# Patient Record
Sex: MALE | Race: WHITE | NOT HISPANIC OR LATINO | Employment: UNEMPLOYED | URBAN - METROPOLITAN AREA
[De-identification: names, ages, dates, MRNs, and addresses within clinical notes are randomized per-mention and may not be internally consistent; named-entity substitution may affect disease eponyms.]

---

## 2017-03-08 ENCOUNTER — OFFICE VISIT (OUTPATIENT)
Dept: MEDICAL GROUP | Facility: MEDICAL CENTER | Age: 37
End: 2017-03-08
Payer: COMMERCIAL

## 2017-03-08 VITALS
HEART RATE: 83 BPM | TEMPERATURE: 98.2 F | HEIGHT: 70 IN | SYSTOLIC BLOOD PRESSURE: 120 MMHG | OXYGEN SATURATION: 98 % | WEIGHT: 180 LBS | RESPIRATION RATE: 12 BRPM | BODY MASS INDEX: 25.77 KG/M2 | DIASTOLIC BLOOD PRESSURE: 84 MMHG

## 2017-03-08 DIAGNOSIS — R19.8 IRREGULAR BOWEL HABITS: ICD-10-CM

## 2017-03-08 DIAGNOSIS — K64.9 HEMORRHOIDS, UNSPECIFIED HEMORRHOID TYPE: ICD-10-CM

## 2017-03-08 DIAGNOSIS — Z13.6 SCREENING FOR CARDIOVASCULAR CONDITION: ICD-10-CM

## 2017-03-08 PROCEDURE — 99214 OFFICE O/P EST MOD 30 MIN: CPT | Performed by: PHYSICIAN ASSISTANT

## 2017-03-08 RX ORDER — ONDANSETRON 4 MG/1
4 TABLET, FILM COATED ORAL EVERY 4 HOURS PRN
Qty: 30 TAB | Refills: 1 | Status: SHIPPED | OUTPATIENT
Start: 2017-03-08 | End: 2018-12-27

## 2017-03-08 ASSESSMENT — PATIENT HEALTH QUESTIONNAIRE - PHQ9: CLINICAL INTERPRETATION OF PHQ2 SCORE: 0

## 2017-03-08 NOTE — PROGRESS NOTES
Chief Complaint   Patient presents with   • Other       HISTORY OF PRESENT ILLNESS: Patient is a 36 y.o. male established patient who presents today to discuss problems with bowel habits    Irregular bowel habits  Chronic in nature. Positive history of alternating diarrhea and constipation. As a result positive history of hemorrhoids. He has seen gastroenterology consultants in the past in [place], [place]. Patient admits last seen GI consult proximally one year possibly longer ago. Patient states last colonoscopy 2011. Patient states that he's been told he is a candidate for surgery to help with hemorrhoids. As a result patient is very apprehensive and nervous about the surgery. Patient states that she does try to reduce the obsessive-compulsive Ball with defecation and tries to do only to good pushes rather then pushing until he does have a bowel movement. Patient states positive blood on occasion. Last episodes of bleeding approximately 1+ month ago. Patient she will have bouts of bleeding and then have no signs of bleeding. Patient states that he will wipe after having a bowel movement. Patient states typically will use a wet Kleenex followed by a paper towel in the region.         Patient Active Problem List    Diagnosis Date Noted   • Tourette disorder 07/21/2016   • Irregular bowel habits 07/21/2016   • Dyspepsia 07/21/2016       Allergies:Review of patient's allergies indicates no known allergies.    Current Outpatient Prescriptions   Medication Sig Dispense Refill   • witch hazel-glycerin (SOOZE) Pads i pad applied bid 60 Each 0   • ondansetron (ZOFRAN) 4 MG Tab tablet Take 1 Tab by mouth every four hours as needed for Nausea/Vomiting. 30 Tab 1   • clonidine (CATAPRESS) 0.3 MG Tab Take 1 Tab by mouth 3 times a day and at bedtime. 90 Tab 3   • ranitidine (ZANTAC) 150 MG Tab Take 1 Tab by mouth 2 times a day. 60 Tab 5     No current facility-administered medications for this visit.       Social History  "  Substance Use Topics   • Smoking status: Never Smoker    • Smokeless tobacco: Never Used   • Alcohol Use: No       No family status information on file.   No family history on file.    Review of Systems:   Constitutional: Negative for fever, chills, weight loss and malaise/fatigue. Patient states that he does sweat while sleeping excessively  HENT: Negative for ear pain, nosebleeds, congestion, sore throat and neck pain.    Eyes: Negative for blurred vision.   Respiratory: Negative for cough, sputum production, shortness of breath and wheezing.    Cardiovascular: Negative for chest pain, palpitations, orthopnea and leg swelling.   Gastrointestinal: Negative for heartburn, nausea, vomiting and abdominal pain.   Genitourinary: Negative for dysuria, urgency and frequency.   Musculoskeletal: Negative for myalgias, back pain and joint pain.   Skin: Negative for rash and itching.   Neurological: Negative for dizziness, tingling, tremors, sensory change, focal weakness and headaches.   Endo/Heme/Allergies: Does not bruise/bleed easily.   Psychiatric/Behavioral: Negative for depression, suicidal ideas and memory loss.  The patient is not nervous/anxious and does not have insomnia.    All other systems reviewed and are negative except as in HPI.    Exam:  Blood pressure 120/84, pulse 83, temperature 36.8 °C (98.2 °F), resp. rate 12, height 1.778 m (5' 10\"), weight 81.647 kg (180 lb), SpO2 98 %.  General:  Well nourished, well developed male in NAD  Head: is grossly normal.  Neck: Supple without JVD or bruit. Thyroid is not enlarged.  Pulmonary: Clear to ausculation. Normal effort. No rales, ronchi, or wheezing.  Cardiovascular: Regular rate and rhythm without murmur. Carotid and radial pulses are intact and equal bilaterally.  Extremities: no clubbing, cyanosis, or edema.  Genitourinary: Positive external hemorrhoids noted in the periphery of the anal opening. No tenderness to palpation. Nonthrombosed    Medical " decision-making and discussion: Discussion with patient we are to try Tucks medicated pads patient is advised he needs to follow up with gastrology consultants in [place], [place]. Advised to drink lots of fluids, no prolonged defecation. Patient very anxious and nervous during her evaluation today. Discussed with patient that we will discuss anxiety in future encounters. Labs have been ordered to check health status    Please note that this dictation was created using voice recognition software. I have made every reasonable attempt to correct obvious errors, but I expect that there are errors of grammar and possibly content that I did not discover before finalizing the note.    Assessment/Plan:  1. Irregular bowel habits  TSH    URINALYSIS,CULTURE IF INDICATED    ondansetron (ZOFRAN) 4 MG Tab tablet   2. Hemorrhoids, unspecified hemorrhoid type  witch hazel-glycerin (SOOZE) Pads    REFERRAL TO GASTROENTEROLOGY    COMP METABOLIC PANEL    URINALYSIS,CULTURE IF INDICATED    ondansetron (ZOFRAN) 4 MG Tab tablet   3. Screening for cardiovascular condition  LIPID PROFILE

## 2017-03-23 ENCOUNTER — TELEPHONE (OUTPATIENT)
Dept: MEDICAL GROUP | Facility: MEDICAL CENTER | Age: 37
End: 2017-03-23

## 2017-03-23 NOTE — TELEPHONE ENCOUNTER
----- Message from Marbella Salgado PA-C sent at 3/23/2017 11:56 AM PDT -----  I reviewed labs. There is a value I am concerned about and would like to have the patient return for follow up to discuss.

## 2017-03-23 NOTE — TELEPHONE ENCOUNTER
Phone Number Called: [phone]     Message: Left message for patient to make an appointment.     Left Message for patient to call back:yes

## 2017-03-24 NOTE — TELEPHONE ENCOUNTER
Phone Number Called: [phone] (home)     Message: Unable to contact patient. Phone disconnected.     Left Message for patient to call back: N\A

## 2017-04-05 ENCOUNTER — APPOINTMENT (OUTPATIENT)
Dept: MEDICAL GROUP | Facility: MEDICAL CENTER | Age: 37
End: 2017-04-05
Payer: COMMERCIAL

## 2017-04-11 ENCOUNTER — OFFICE VISIT (OUTPATIENT)
Dept: MEDICAL GROUP | Facility: MEDICAL CENTER | Age: 37
End: 2017-04-11
Payer: COMMERCIAL

## 2017-04-11 VITALS
SYSTOLIC BLOOD PRESSURE: 118 MMHG | TEMPERATURE: 98.1 F | BODY MASS INDEX: 25.2 KG/M2 | RESPIRATION RATE: 16 BRPM | DIASTOLIC BLOOD PRESSURE: 70 MMHG | WEIGHT: 176 LBS | OXYGEN SATURATION: 100 % | HEIGHT: 70 IN | HEART RATE: 74 BPM

## 2017-04-11 DIAGNOSIS — E87.6 HYPOKALEMIA: ICD-10-CM

## 2017-04-11 DIAGNOSIS — R10.13 DYSPEPSIA: ICD-10-CM

## 2017-04-11 DIAGNOSIS — R19.8 IRREGULAR BOWEL HABITS: ICD-10-CM

## 2017-04-11 DIAGNOSIS — E03.9 HYPOTHYROIDISM, UNSPECIFIED TYPE: ICD-10-CM

## 2017-04-11 PROCEDURE — 99214 OFFICE O/P EST MOD 30 MIN: CPT | Performed by: PHYSICIAN ASSISTANT

## 2017-04-11 RX ORDER — LEVOTHYROXINE SODIUM 0.03 MG/1
25 TABLET ORAL
Qty: 30 TAB | Refills: 2 | Status: SHIPPED | OUTPATIENT
Start: 2017-04-11 | End: 2017-10-03 | Stop reason: SINTOL

## 2017-04-11 RX ORDER — RABEPRAZOLE SODIUM 20 MG/1
20 TABLET, DELAYED RELEASE ORAL DAILY
Qty: 30 TAB | Refills: 3 | Status: SHIPPED | OUTPATIENT
Start: 2017-04-11 | End: 2017-08-19 | Stop reason: SDUPTHER

## 2017-04-11 RX ORDER — POTASSIUM CHLORIDE 750 MG/1
10 TABLET, FILM COATED, EXTENDED RELEASE ORAL DAILY
Qty: 5 TAB | Refills: 0 | Status: SHIPPED | OUTPATIENT
Start: 2017-04-11 | End: 2017-04-16

## 2017-04-11 NOTE — ASSESSMENT & PLAN NOTE
Chronic in nature. Patient states still symptomatic. Patient states he has not followed up with GI from previous encounter.

## 2017-04-11 NOTE — ASSESSMENT & PLAN NOTE
Patient states history of irregular bowel habits and history of onset stomach. Positive history of nausea. Patient states that ranitidine 150 mg prescription strength worked better than the over-the-counter remedy patient states that also the omeprazole did help, but states only 5 days at a time was being prescribed for patient and filled through his insurance. Patient she would like to try a different medication.

## 2017-04-11 NOTE — MR AVS SNAPSHOT
"        Adis Chance   2017 4:00 PM   Office Visit   MRN: [id number]    Department:  [place] [address]   Dept Phone:  [phone]    Description:  Male : 1980   Provider:  Lamberto Hassan PA-C           Reason for Visit     Results Labs    Medication Refill Omeprazole-Insurance won't cover      Allergies as of 2017     No Known Allergies      You were diagnosed with     Dyspepsia   [710304]       Irregular bowel habits   [150820]       Hypokalemia   [139073]       Hypothyroidism, unspecified type   [0616765]         Vital Signs     Blood Pressure Pulse Temperature Respirations Height Weight    118/70 mmHg 74 36.7 °C (98.1 °F) 16 1.778 m (5' 10\") 79.833 kg (176 lb)    Body Mass Index Oxygen Saturation Smoking Status             25.25 kg/m2 100% Never Smoker          Basic Information     Date Of Birth Sex Race Ethnicity Preferred Language    1980 Male White Non- English      Problem List              ICD-10-CM Priority Class Noted - Resolved    Tourette disorder F95.2   2016 - Present    Irregular bowel habits R19.8   2016 - Present    Dyspepsia R10.13   2016 - Present      Health Maintenance        Date Due Completion Dates    IMM DTaP/Tdap/Td Vaccine (1 - Tdap) 2000 ---    COLONOSCOPY 3/27/2018 3/27/2008            Current Immunizations     No immunizations on file.      Below and/or attached are the medications your provider expects you to take. Review all of your home medications and newly ordered medications with your provider and/or pharmacist. Follow medication instructions as directed by your provider and/or pharmacist. Please keep your medication list with you and share with your provider. Update the information when medications are discontinued, doses are changed, or new medications (including over-the-counter products) are added; and carry medication information at all times in the event of emergency situations     Allergies:  No Known Allergies       "   Medications  Valid as of: April 11, 2017 -  4:01 PM    Generic Name Brand Name Tablet Size Instructions for use    CloNIDine HCl (Tab) CATAPRESS 0.3 MG Take 1 Tab by mouth 3 times a day and at bedtime.        Levothyroxine Sodium (Tab) SYNTHROID 25 MCG Take 1 Tab by mouth Every morning on an empty stomach.        Ondansetron HCl (Tab) ZOFRAN 4 MG Take 1 Tab by mouth every four hours as needed for Nausea/Vomiting.        Potassium Chloride (Tab CR) KLOR-CON 10 MEQ Take 1 Tab by mouth every day for 5 days.        RABEprazole Sodium (Tablet Delayed Response) ACIPHEX 20 MG Take 1 Tab by mouth every day.        Witch Hazel-Glycerin (Pads) SOOZE  i pad applied bid        .                 Medicines prescribed today were sent to:     [facility] #[id number] - [place] [address] [address]    Phone: [phone] Fax: [phone]    Open 24 Hours?: No      Medication refill instructions:       If your prescription bottle indicates you have medication refills left, it is not necessary to call your provider’s office. Please contact your pharmacy and they will refill your medication.    If your prescription bottle indicates you do not have any refills left, you may request refills at any time through one of the following ways: The online Profig system (except Urgent Care), by calling your provider’s office, or by asking your pharmacy to contact your provider’s office with a refill request. Medication refills are processed only during regular business hours and may not be available until the next business day. Your provider may request additional information or to have a follow-up visit with you prior to refilling your medication.   *Please Note: Medication refills are assigned a new Rx number when refilled electronically. Your pharmacy may indicate that no refills were authorized even though a new prescription for the same medication is available at the pharmacy. Please  request the medicine by name with the pharmacy before contacting your provider for a refill.           Distill Access Code: [email or web address]  Expires: 4/25/2017 11:08 AM    Your email address is not on file at [facility].  Email Addresses are required for you to sign up for Distill, please contact [phone] to verify your personal information and to provide your email address prior to attempting to register for Distill.    Adis Renaelexi   [address] [address]  [place] [address]    Distill  A secure, online tool to manage your health information     [facility]’s Distill® is a secure, online tool that connects you to your personalized health information from the privacy of your home -- day or night - making it very easy for you to manage your healthcare. Once the activation process is completed, you can even access your medical information using the Distill francisco, which is available for free in the Apple Francisco store or Google Play store.     To learn more about Distill, visit [email or web address]    There are two levels of access available (as shown below):   My Chart Features  [facility] Primary Care Doctor [facility]  Specialists [facility]  Urgent  Care Non-[facility] Primary Care Doctor   Email your healthcare team securely and privately 24/7 X X X    Manage appointments: schedule your next appointment; view details of past/upcoming appointments X      Request prescription refills. X      View recent personal medical records, including lab and immunizations X X X X   View health record, including health history, allergies, medications X X X X   Read reports about your outpatient visits, procedures, consult and ER notes X X X X   See your discharge summary, which is a recap of your hospital and/or ER visit that includes your diagnosis, lab results, and care plan X X  X     How to register for Distill:  Once your e-mail address has been verified, follow the following steps to sign up for Distill.     1. Go to   [email or web address]  2. Click on the Sign Up Now box, which takes you to the New Member Sign Up page. You will need to provide the following information:  a. Enter your Curalate Access Code exactly as it appears at the top of this page. (You will not need to use this code after you’ve completed the sign-up process. If you do not sign up before the expiration date, you must request a new code.)   b. Enter your date of birth.   c. Enter your home email address.   d. Click Submit, and follow the next screen’s instructions.  3. Create a Curalate ID. This will be your Curalate login ID and cannot be changed, so think of one that is secure and easy to remember.  4. Create a eCullett password. You can change your password at any time.  5. Enter your Password Reset Question and Answer. This can be used at a later time if you forget your password.   6. Enter your e-mail address. This allows you to receive e-mail notifications when new information is available in Curalate.  7. Click Sign Up. You can now view your health information.    For assistance activating your Curalate account, call [phone]

## 2017-04-28 ENCOUNTER — OFFICE VISIT (OUTPATIENT)
Dept: MEDICAL GROUP | Facility: MEDICAL CENTER | Age: 37
End: 2017-04-28
Payer: COMMERCIAL

## 2017-04-28 VITALS
OXYGEN SATURATION: 100 % | DIASTOLIC BLOOD PRESSURE: 80 MMHG | SYSTOLIC BLOOD PRESSURE: 130 MMHG | TEMPERATURE: 99.2 F | HEART RATE: 76 BPM | WEIGHT: 172 LBS | HEIGHT: 70 IN | BODY MASS INDEX: 24.62 KG/M2

## 2017-04-28 DIAGNOSIS — J10.1 INFLUENZA B: ICD-10-CM

## 2017-04-28 PROBLEM — J02.9 PHARYNGITIS: Status: ACTIVE | Noted: 2017-04-28

## 2017-04-28 LAB
FLUAV+FLUBV AG SPEC QL IA: NORMAL
INT CON NEG: NEGATIVE
INT CON POS: POSITIVE

## 2017-04-28 PROCEDURE — 87804 INFLUENZA ASSAY W/OPTIC: CPT | Performed by: FAMILY MEDICINE

## 2017-04-28 PROCEDURE — 99214 OFFICE O/P EST MOD 30 MIN: CPT | Performed by: FAMILY MEDICINE

## 2017-04-28 RX ORDER — CODEINE PHOSPHATE/GUAIFENESIN 10-100MG/5
5 LIQUID (ML) ORAL 3 TIMES DAILY PRN
Qty: 180 ML | Refills: 0 | Status: SHIPPED
Start: 2017-04-28 | End: 2017-04-28 | Stop reason: SDUPTHER

## 2017-04-28 RX ORDER — CODEINE PHOSPHATE/GUAIFENESIN 10-100MG/5
5 LIQUID (ML) ORAL 3 TIMES DAILY PRN
Qty: 180 ML | Refills: 0 | Status: SHIPPED | OUTPATIENT
Start: 2017-04-28 | End: 2017-06-15

## 2017-04-28 NOTE — PATIENT INSTRUCTIONS
Influenza, Adult  Influenza (flu) is an infection in the mouth, nose, and throat (respiratory tract) caused by a virus. The flu can make you feel very ill. Influenza spreads easily from person to person (contagious).   HOME CARE   · Only take medicines as told by your doctor.  · Use a cool mist humidifier to make breathing easier.  · Get plenty of rest until your fever goes away. This usually takes 3 to 4 days.  · Drink enough fluids to keep your pee (urine) clear or pale yellow.  · Cover your mouth and nose when you cough or sneeze.  · Wash your hands well to avoid spreading the flu.  · Stay home from work or school until your fever has been gone for at least 1 full day.  · Get a flu shot every year.  GET HELP RIGHT AWAY IF:   · You have trouble breathing or feel short of breath.  · Your skin or nails turn blue.  · You have severe neck pain or stiffness.  · You have a severe headache, facial pain, or earache.  · Your fever gets worse or keeps coming back.  · You feel sick to your stomach (nauseous), throw up (vomit), or have watery poop (diarrhea).  · You have chest pain.  · You have a deep cough that gets worse, or you cough up more thick spit (mucus).  MAKE SURE YOU:   · Understand these instructions.  · Will watch your condition.  · Will get help right away if you are not doing well or get worse.     This information is not intended to replace advice given to you by your health care provider. Make sure you discuss any questions you have with your health care provider.     Document Released: 09/26/2009 Document Revised: 01/08/2016 Document Reviewed: 03/18/2013  [employer] Interactive Patient Education ©2016 [employer]

## 2017-04-28 NOTE — ASSESSMENT & PLAN NOTE
Illness: 5 days of illness including: nasal congestion, sore throat, cough, myalgias, chills.  Diffuse headache    Symptoms negative for facial pain, swollen glands,   Treatments tried: none   Since onset, symptoms are much worse   Similarly ill exposures: yes.  Dad had it and went to the ICU on Sunday  Medical history negative for asthma  He  reports that he has never smoked. He has never used smokeless tobacco.

## 2017-04-28 NOTE — PROGRESS NOTES
"Subjective:     Chief Complaint   Patient presents with   • Pharyngitis     since Sunday   • Cough       Adis Chance is a 36 y.o. male here today for evaluation and management of:    Influenza B  Illness: 5 days of illness including: nasal congestion, sore throat, cough, myalgias, chills.  Diffuse headache    Symptoms negative for facial pain, swollen glands,   Treatments tried: none   Since onset, symptoms are much worse   Similarly ill exposures: yes.  Dad had it and went to the ICU on Sunday  Medical history negative for asthma  He  reports that he has never smoked. He has never used smokeless tobacco.         No Known Allergies    Current medicines (including changes today)  Current Outpatient Prescriptions   Medication Sig Dispense Refill   • guaifenesin-codeine (TUSSI-ORGANIDIN NR) 100-10 MG/5ML syrup Take 5 mL by mouth 3 times a day as needed for Cough. 180 mL 0   • rabeprazole (ACIPHEX) 20 MG tablet Take 1 Tab by mouth every day. 30 Tab 3   • levothyroxine (SYNTHROID) 25 MCG Tab Take 1 Tab by mouth Every morning on an empty stomach. 30 Tab 2   • witch hazel-glycerin (SOOZE) Pads i pad applied bid 60 Each 0   • clonidine (CATAPRESS) 0.3 MG Tab Take 1 Tab by mouth 3 times a day and at bedtime. 90 Tab 3   • ondansetron (ZOFRAN) 4 MG Tab tablet Take 1 Tab by mouth every four hours as needed for Nausea/Vomiting. 30 Tab 1     No current facility-administered medications for this visit.       He  has no past medical history on file.    Patient Active Problem List    Diagnosis Date Noted   • Influenza B 04/28/2017   • Tourette disorder 07/21/2016   • Irregular bowel habits 07/21/2016   • Dyspepsia 07/21/2016       ROS     No nausea or vomiting.  No palpitations.  .No pain with urination or hematuria.  No black or bloody stools.       Objective:     Blood pressure 130/80, pulse 76, temperature 37.3 °C (99.2 °F), height 1.778 m (5' 10\"), weight 78.019 kg (172 lb), SpO2 100 %. Body mass index is 24.68 kg/(m^2). "   Physical Exam:  Well developed, well nourished.  Alert, oriented in mild distress.  Eye contact is good, speech goal directed, affect calm  Eyes: conjunctiva non-injected, sclera non-icteric.  Ears: Pinna normal. TM pearly gray.   Nose: Nares are patent.  Erythematous swollen mucosa with clear discharge  Mouth: Oral mucous membranes pink and moist with no lesions. Mild diffuse posterior pharynx erythema  Neck Supple.  No adenopathy or masses in the neck or supraclavicular regions. No thyromegaly  Lungs: clear to auscultation bilaterally with good excursion. No wheezes or rhonchi. No increased work of breathing or intercostal retractions  CV: regular rate and rhythm. No murmur   POCT Influenza B positive      Assessment and Plan:   The following treatment plan was discussed    1. Influenza B  Discussed with patient that he is beyond the time when Tamiflu would be effective. At this point it is symptomatic care. Increase fluids and rest. Robitussin with codeine as needed for cough. Patient is not currently working. So does not a note off. Call if not improving  - POCT Influenza A/B  - guaifenesin-codeine (TUSSI-ORGANIDIN NR) 100-10 MG/5ML syrup; Take 5 mL by mouth 3 times a day as needed for Cough.  Dispense: 180 mL; Refill: 0    Any change or worsening of signs or symptoms, patient encouraged to follow-up or report to the emergency room for further evaluation. Patient understands and agrees.    Followup: Return if symptoms worsen or fail to improve.

## 2017-04-28 NOTE — MR AVS SNAPSHOT
"        Adis Chance   2017 2:40 PM   Office Visit   MRN: [id number]    Department:  [facility]   Dept Phone:  [phone]    Description:  Male : 1980   Provider:  Li Fabian M.D.           Reason for Visit     Pharyngitis since     Cough           Allergies as of 2017     No Known Allergies      You were diagnosed with     Influenza B   [[id number]]         Vital Signs     Blood Pressure Pulse Temperature Height Weight Body Mass Index    130/80 mmHg 76 37.3 °C (99.2 °F) 1.778 m (5' 10\") 78.019 kg (172 lb) 24.68 kg/m2    Oxygen Saturation Smoking Status                100% Never Smoker           Basic Information     Date Of Birth Sex Race Ethnicity Preferred Language    1980 Male White Non- English      Problem List              ICD-10-CM Priority Class Noted - Resolved    Tourette disorder F95.2   2016 - Present    Irregular bowel habits R19.8   2016 - Present    Dyspepsia R10.13   2016 - Present    Influenza B J10.1   2017 - Present      Health Maintenance        Date Due Completion Dates    IMM DTaP/Tdap/Td Vaccine (1 - Tdap) 2000 ---    COLONOSCOPY 3/27/2018 3/27/2008            Results     POCT Influenza A/B      Component    Rapid Influenza A-B    POS    Internal Control Positive    Positive    Internal Control Negative    Negative                        Current Immunizations     No immunizations on file.      Below and/or attached are the medications your provider expects you to take. Review all of your home medications and newly ordered medications with your provider and/or pharmacist. Follow medication instructions as directed by your provider and/or pharmacist. Please keep your medication list with you and share with your provider. Update the information when medications are discontinued, doses are changed, or new medications (including over-the-counter products) are added; and carry medication information at all times in the event " of emergency situations     Allergies:  No Known Allergies          Medications  Valid as of: April 28, 2017 -  3:24 PM    Generic Name Brand Name Tablet Size Instructions for use    CloNIDine HCl (Tab) CATAPRESS 0.3 MG Take 1 Tab by mouth 3 times a day and at bedtime.        Guaifenesin-Codeine (Syrup) TUSSI-ORGANIDIN -10 MG/5ML Take 5 mL by mouth 3 times a day as needed for Cough.        Levothyroxine Sodium (Tab) SYNTHROID 25 MCG Take 1 Tab by mouth Every morning on an empty stomach.        Ondansetron HCl (Tab) ZOFRAN 4 MG Take 1 Tab by mouth every four hours as needed for Nausea/Vomiting.        RABEprazole Sodium (Tablet Delayed Response) ACIPHEX 20 MG Take 1 Tab by mouth every day.        Witch Hazel-Glycerin (Pads) SOOZE  i pad applied bid        .                 Medicines prescribed today were sent to:     [facility]/PHARMACY #[id number] [address] [address]    Phone: [phone] Fax: [phone]    Open 24 Hours?: No      Medication refill instructions:       If your prescription bottle indicates you have medication refills left, it is not necessary to call your provider’s office. Please contact your pharmacy and they will refill your medication.    If your prescription bottle indicates you do not have any refills left, you may request refills at any time through one of the following ways: The online [employer] system (except Urgent Care), by calling your provider’s office, or by asking your pharmacy to contact your provider’s office with a refill request. Medication refills are processed only during regular business hours and may not be available until the next business day. Your provider may request additional information or to have a follow-up visit with you prior to refilling your medication.   *Please Note: Medication refills are assigned a new Rx number when refilled electronically. Your pharmacy may indicate that no refills were authorized  even though a new prescription for the same medication is available at the pharmacy. Please request the medicine by name with the pharmacy before contacting your provider for a refill.        Instructions    Influenza, Adult  Influenza (flu) is an infection in the mouth, nose, and throat (respiratory tract) caused by a virus. The flu can make you feel very ill. Influenza spreads easily from person to person (contagious).   HOME CARE   · Only take medicines as told by your doctor.  · Use a cool mist humidifier to make breathing easier.  · Get plenty of rest until your fever goes away. This usually takes 3 to 4 days.  · Drink enough fluids to keep your pee (urine) clear or pale yellow.  · Cover your mouth and nose when you cough or sneeze.  · Wash your hands well to avoid spreading the flu.  · Stay home from work or school until your fever has been gone for at least 1 full day.  · Get a flu shot every year.  GET HELP RIGHT AWAY IF:   · You have trouble breathing or feel short of breath.  · Your skin or nails turn blue.  · You have severe neck pain or stiffness.  · You have a severe headache, facial pain, or earache.  · Your fever gets worse or keeps coming back.  · You feel sick to your stomach (nauseous), throw up (vomit), or have watery poop (diarrhea).  · You have chest pain.  · You have a deep cough that gets worse, or you cough up more thick spit (mucus).  MAKE SURE YOU:   · Understand these instructions.  · Will watch your condition.  · Will get help right away if you are not doing well or get worse.     This information is not intended to replace advice given to you by your health care provider. Make sure you discuss any questions you have with your health care provider.     Document Released: 09/26/2009 Document Revised: 01/08/2016 Document Reviewed: 03/18/2013  [employer] Interactive Patient Education ©2016 [employer]            MyChart Status: Patient Declined

## 2017-05-22 ENCOUNTER — APPOINTMENT (OUTPATIENT)
Dept: MEDICAL GROUP | Facility: MEDICAL CENTER | Age: 37
End: 2017-05-22
Payer: COMMERCIAL

## 2017-06-15 ENCOUNTER — OFFICE VISIT (OUTPATIENT)
Dept: MEDICAL GROUP | Facility: MEDICAL CENTER | Age: 37
End: 2017-06-15
Payer: COMMERCIAL

## 2017-06-15 VITALS
HEIGHT: 70 IN | DIASTOLIC BLOOD PRESSURE: 70 MMHG | TEMPERATURE: 98.5 F | BODY MASS INDEX: 25.31 KG/M2 | WEIGHT: 176.8 LBS | HEART RATE: 66 BPM | SYSTOLIC BLOOD PRESSURE: 102 MMHG | RESPIRATION RATE: 16 BRPM | OXYGEN SATURATION: 98 %

## 2017-06-15 DIAGNOSIS — G89.29 NECK PAIN, CHRONIC: ICD-10-CM

## 2017-06-15 DIAGNOSIS — F42.9 OBSESSIVE-COMPULSIVE DISORDER, UNSPECIFIED TYPE: ICD-10-CM

## 2017-06-15 DIAGNOSIS — K62.5 RECTAL BLEEDING: ICD-10-CM

## 2017-06-15 DIAGNOSIS — F95.2 TOURETTE DISORDER: ICD-10-CM

## 2017-06-15 DIAGNOSIS — E03.8 SUBCLINICAL HYPOTHYROIDISM: ICD-10-CM

## 2017-06-15 DIAGNOSIS — R21 RASH: ICD-10-CM

## 2017-06-15 DIAGNOSIS — M54.2 NECK PAIN, CHRONIC: ICD-10-CM

## 2017-06-15 PROCEDURE — 99214 OFFICE O/P EST MOD 30 MIN: CPT | Performed by: PHYSICIAN ASSISTANT

## 2017-06-15 RX ORDER — CLONIDINE HYDROCHLORIDE 0.3 MG/1
0.3 TABLET ORAL
Qty: 90 TAB | Refills: 3 | Status: SHIPPED | OUTPATIENT
Start: 2017-06-15 | End: 2017-10-14 | Stop reason: SDUPTHER

## 2017-06-15 RX ORDER — TRIAMCINOLONE ACETONIDE 1 MG/G
1 CREAM TOPICAL 2 TIMES DAILY
Qty: 1 TUBE | Refills: 2 | Status: SHIPPED | OUTPATIENT
Start: 2017-06-15 | End: 2017-11-30

## 2017-06-15 NOTE — ASSESSMENT & PLAN NOTE
He states that because of his OCD he will sit until he has pressure down in the rectum then once that is alleviating on the toilet he will continue to press and push and bear down trying to have a bowel movement. This will go on for a long time.

## 2017-06-15 NOTE — ASSESSMENT & PLAN NOTE
Was diagnosed with subclinical hypothyroidism and placed on 25 µg of levothyroxine. Complained of fatigue at that time. While he was taking the medication he developed episodes of tachycardia. He has not been taking the medication for over a month. His last TSH value when he was provided medication was in the 5 range.
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
## 2017-06-15 NOTE — ASSESSMENT & PLAN NOTE
Complains of continued issues with rectal bleeding secondary to internal and external hemorrhoids. When he saw GI they advised him to see a general surgeon. He states that creams are not effective. He will eat once a day. Sometimes he will not have a bowel movement for several days. He states that he will sit on the toilet for an excess amount of time and even up to 2 hours.

## 2017-06-15 NOTE — ASSESSMENT & PLAN NOTE
History of a rash on his bilateral, anterior aspects of his legs with the right side being worse than the left. Requesting a refill of a cream and likely a steroid cream.

## 2017-06-15 NOTE — ASSESSMENT & PLAN NOTE
This is a 37-year-old male who is accompanied by his mother. Tourette's he states acts up worse in the summertime. He is requesting a refill of his clonidine. He never did follow-up with psychiatry per my referral.
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
## 2017-10-03 ENCOUNTER — OFFICE VISIT (OUTPATIENT)
Dept: MEDICAL GROUP | Facility: PHYSICIAN GROUP | Age: 37
End: 2017-10-03
Payer: COMMERCIAL

## 2017-10-03 ENCOUNTER — HOSPITAL ENCOUNTER (OUTPATIENT)
Dept: LAB | Facility: MEDICAL CENTER | Age: 37
End: 2017-10-03
Attending: FAMILY MEDICINE
Payer: COMMERCIAL

## 2017-10-03 VITALS
HEART RATE: 73 BPM | RESPIRATION RATE: 16 BRPM | SYSTOLIC BLOOD PRESSURE: 102 MMHG | HEIGHT: 70 IN | WEIGHT: 180 LBS | TEMPERATURE: 98.6 F | BODY MASS INDEX: 25.77 KG/M2 | DIASTOLIC BLOOD PRESSURE: 70 MMHG | OXYGEN SATURATION: 98 %

## 2017-10-03 DIAGNOSIS — D64.9 ANEMIA, UNSPECIFIED TYPE: ICD-10-CM

## 2017-10-03 DIAGNOSIS — E03.8 SUBCLINICAL HYPOTHYROIDISM: ICD-10-CM

## 2017-10-03 DIAGNOSIS — F95.2 TOURETTE DISORDER: ICD-10-CM

## 2017-10-03 DIAGNOSIS — S86.811D STRAIN OF CALF MUSCLE, RIGHT, SUBSEQUENT ENCOUNTER: ICD-10-CM

## 2017-10-03 DIAGNOSIS — K58.2 IRRITABLE BOWEL SYNDROME WITH BOTH CONSTIPATION AND DIARRHEA: ICD-10-CM

## 2017-10-03 LAB
ANISOCYTOSIS BLD QL SMEAR: ABNORMAL
BASOPHILS # BLD AUTO: 4.4 % (ref 0–1.8)
BASOPHILS # BLD: 0.18 K/UL (ref 0–0.12)
EOSINOPHIL # BLD AUTO: 0.55 K/UL (ref 0–0.51)
EOSINOPHIL NFR BLD: 13.1 % (ref 0–6.9)
ERYTHROCYTE [DISTWIDTH] IN BLOOD BY AUTOMATED COUNT: 53.4 FL (ref 35.9–50)
FOLATE SERPL-MCNC: 4.8 NG/ML
HCT VFR BLD AUTO: 33.5 % (ref 42–52)
HGB BLD-MCNC: 9.4 G/DL (ref 14–18)
IRON SATN MFR SERPL: 3 % (ref 15–55)
IRON SERPL-MCNC: 14 UG/DL (ref 50–180)
LYMPHOCYTES # BLD AUTO: 0.66 K/UL (ref 1–4.8)
LYMPHOCYTES NFR BLD: 15.8 % (ref 22–41)
MANUAL DIFF BLD: NORMAL
MCH RBC QN AUTO: 20.9 PG (ref 27–33)
MCHC RBC AUTO-ENTMCNC: 28.1 G/DL (ref 33.7–35.3)
MCV RBC AUTO: 74.4 FL (ref 81.4–97.8)
MICROCYTES BLD QL SMEAR: ABNORMAL
MONOCYTES # BLD AUTO: 0.37 K/UL (ref 0–0.85)
MONOCYTES NFR BLD AUTO: 8.8 % (ref 0–13.4)
MORPHOLOGY BLD-IMP: NORMAL
NEUTROPHILS # BLD AUTO: 2.43 K/UL (ref 1.82–7.42)
NEUTROPHILS NFR BLD: 57.9 % (ref 44–72)
NRBC # BLD AUTO: 0 K/UL
NRBC BLD AUTO-RTO: 0 /100 WBC
PLATELET # BLD AUTO: 411 K/UL (ref 164–446)
PMV BLD AUTO: 8.7 FL (ref 9–12.9)
POIKILOCYTOSIS BLD QL SMEAR: NORMAL
RBC # BLD AUTO: 4.5 M/UL (ref 4.7–6.1)
RBC BLD AUTO: PRESENT
SCHISTOCYTES BLD QL SMEAR: NORMAL
TIBC SERPL-MCNC: 440 UG/DL (ref 250–450)
VIT B12 SERPL-MCNC: 456 PG/ML (ref 211–911)
WBC # BLD AUTO: 4.2 K/UL (ref 4.8–10.8)

## 2017-10-03 PROCEDURE — 82607 VITAMIN B-12: CPT

## 2017-10-03 PROCEDURE — 85007 BL SMEAR W/DIFF WBC COUNT: CPT

## 2017-10-03 PROCEDURE — 83540 ASSAY OF IRON: CPT

## 2017-10-03 PROCEDURE — 82746 ASSAY OF FOLIC ACID SERUM: CPT

## 2017-10-03 PROCEDURE — 36415 COLL VENOUS BLD VENIPUNCTURE: CPT

## 2017-10-03 PROCEDURE — 99214 OFFICE O/P EST MOD 30 MIN: CPT | Performed by: FAMILY MEDICINE

## 2017-10-03 PROCEDURE — 85027 COMPLETE CBC AUTOMATED: CPT

## 2017-10-03 PROCEDURE — 83550 IRON BINDING TEST: CPT

## 2017-10-03 RX ORDER — RABEPRAZOLE SODIUM 20 MG/1
20 TABLET, DELAYED RELEASE ORAL
Qty: 30 TAB | Refills: 5 | Status: SHIPPED | OUTPATIENT
Start: 2017-10-03 | End: 2017-11-30

## 2017-10-03 ASSESSMENT — ENCOUNTER SYMPTOMS
RESPIRATORY NEGATIVE: 1
PSYCHIATRIC NEGATIVE: 1
CONSTIPATION: 1
COUGH: 0
PALPITATIONS: 0
ABDOMINAL PAIN: 1
NAUSEA: 1
HEARTBURN: 1
DIZZINESS: 0
MYALGIAS: 0
CONSTITUTIONAL NEGATIVE: 1
CARDIOVASCULAR NEGATIVE: 1
HEMOPTYSIS: 0
HEADACHES: 0
EYES NEGATIVE: 1
DIARRHEA: 1
NEUROLOGICAL NEGATIVE: 1
MUSCULOSKELETAL NEGATIVE: 1
NECK PAIN: 0
FEVER: 0
BELCHING: 1
CHILLS: 0

## 2017-10-03 NOTE — PROGRESS NOTES
Subjective:      Adis Chance is a 37 y.o. male who presents with Cold Exposure and GI Problem            Patient with history of IBS, had a recent worsening of his abd pain and seen in er, negative ct of abdomen but also found to be slightly anemic but guiac negative  Will have him see his GI for f/u but needs some labs for eval of anemia    There are no diagnoses linked to this encounter.  Past Medical History:  No date: GERD (gastroesophageal reflux disease)  No date: IBS (irritable bowel syndrome)  No date: Thyroid disease  No date: Tourette's  No past surgical history on file.  Smoking status: Never Smoker                                                              Smokeless tobacco: Never Used                      Alcohol use: Yes             History reviewed.  No pertinent family history.      Current Outpatient Prescriptions: •  rabeprazole (ACIPHEX) 20 MG tablet, TAKE 1 TABLET BY MOUTH EVERY DAY, Disp: 30 Tab, Rfl: 5•  clonidine (CATAPRESS) 0.3 MG Tab, Take 1 Tab by mouth 3 times a day and at bedtime., Disp: 90 Tab, Rfl: 3•  triamcinolone acetonide (KENALOG) 0.1 % Cream, Apply 1 Application to affected area(s) 2 times a day. (Patient not taking: Reported on 10/3/2017), Disp: 1 Tube, Rfl: 2•  witch hazel-glycerin (SOOZE) Pads, i pad applied bid (Patient not taking: Reported on 10/3/2017), Disp: 60 Each, Rfl: 0•  ondansetron (ZOFRAN) 4 MG Tab tablet, Take 1 Tab by mouth every four hours as needed for Nausea/Vomiting., Disp: 30 Tab, Rfl: 1        LLQ Pain   This is a recurrent problem. The current episode started in the past 7 days. The onset quality is sudden. The problem occurs intermittently. The problem has been gradually improving. The pain is located in the LLQ and RLQ. The pain is mild. The quality of the pain is colicky. The abdominal pain does not radiate. Associated symptoms include belching, constipation, diarrhea and nausea. Pertinent negatives include no dysuria, fever, headaches or myalgias.  "Nothing aggravates the pain. The pain is relieved by nothing. He has tried nothing for the symptoms. The treatment provided no relief.       Review of Systems   Constitutional: Negative.  Negative for chills and fever.        Past Medical History:  No date: GERD (gastroesophageal reflux disease)  No date: IBS (irritable bowel syndrome)  No date: Thyroid disease  No date: Tourette's  No past surgical history on file.  Smoking status: Never Smoker                                                              Smokeless tobacco: Never Used                      Alcohol use: Yes             History reviewed.  No pertinent family history.     HENT: Positive for congestion.    Eyes: Negative.    Respiratory: Negative.  Negative for cough and hemoptysis.    Cardiovascular: Negative.  Negative for chest pain and palpitations.   Gastrointestinal: Positive for abdominal pain, constipation, diarrhea, heartburn and nausea.   Genitourinary: Negative.  Negative for dysuria and urgency.   Musculoskeletal: Negative.  Negative for myalgias and neck pain.   Skin: Negative.  Negative for rash.   Neurological: Negative.  Negative for dizziness and headaches.   Endo/Heme/Allergies: Negative.    Psychiatric/Behavioral: Negative.  Negative for suicidal ideas.          Objective:     /70   Pulse 73   Temp 37 °C (98.6 °F)   Resp 16   Ht 1.778 m (5' 10\")   Wt 81.6 kg (180 lb)   SpO2 98%   BMI 25.83 kg/m²      Physical Exam   Constitutional: He is oriented to person, place, and time. He appears well-developed and well-nourished. No distress.   HENT:   Head: Normocephalic and atraumatic.   Right Ear: External ear normal.   Left Ear: External ear normal.   Nose: Nose normal.   Mouth/Throat: Oropharynx is clear and moist. No oropharyngeal exudate.   Eyes: Pupils are equal, round, and reactive to light. Right eye exhibits no discharge. Left eye exhibits no discharge. No scleral icterus.   Neck: Normal range of motion. Neck supple. No " JVD present. No tracheal deviation present. No thyromegaly present.   Cardiovascular: Normal rate, regular rhythm, normal heart sounds and intact distal pulses.  Exam reveals no gallop and no friction rub.    No murmur heard.  Pulmonary/Chest: Effort normal and breath sounds normal. No stridor. No respiratory distress. He has no wheezes. He has no rales. He exhibits no tenderness.   Abdominal: Soft. He exhibits no distension and no mass. There is no tenderness. There is no rebound and no guarding. No hernia.   Lymphadenopathy:     He has no cervical adenopathy.   Neurological: He is alert and oriented to person, place, and time. No cranial nerve deficit.   Skin: He is not diaphoretic.   Psychiatric: He has a normal mood and affect. His behavior is normal. Judgment and thought content normal.   Nursing note and vitals reviewed.              Assessment/Plan:     There are no diagnoses linked to this encounter.  ibs- f/u with GI  Uri - otc prn  Anemia will get labs for eval

## 2017-10-14 DIAGNOSIS — F95.2 TOURETTE DISORDER: ICD-10-CM

## 2017-10-16 RX ORDER — CLONIDINE HYDROCHLORIDE 0.3 MG/1
0.3 TABLET ORAL
Qty: 90 TAB | Refills: 1 | Status: SHIPPED | OUTPATIENT
Start: 2017-10-16 | End: 2017-12-13 | Stop reason: SDUPTHER

## 2017-10-16 NOTE — TELEPHONE ENCOUNTER
Was the patient seen in the last year in this department? Yes    Last visit: 06/15/2017    Does patient have an active prescription for medications requested? No     Received Request Via: Pharmacy

## 2017-10-18 ENCOUNTER — PHYSICAL THERAPY (OUTPATIENT)
Dept: PHYSICAL THERAPY | Facility: MEDICAL CENTER | Age: 37
End: 2017-10-18
Attending: PHYSICIAN ASSISTANT
Payer: COMMERCIAL

## 2017-10-18 DIAGNOSIS — S86.811D STRAIN OF OTHER MUSCLE(S) AND TENDON(S) AT LOWER LEG LEVEL, RIGHT LEG, SUBSEQUENT ENCOUNTER: ICD-10-CM

## 2017-10-18 PROCEDURE — 97161 PT EVAL LOW COMPLEX 20 MIN: CPT

## 2017-10-18 PROCEDURE — 97014 ELECTRIC STIMULATION THERAPY: CPT

## 2017-10-18 ASSESSMENT — ENCOUNTER SYMPTOMS
ALLEVIATING FACTORS: ICE
PAIN SCALE AT LOWEST: 0
QUALITY: PULLING
PAIN SCALE: 0
EXACERBATED BY: RUNNING
PAIN SCALE AT HIGHEST: 7
PAIN LOCATION: R CALF
PAIN TIMING: WHEN ACTIVE
ALLEVIATING FACTORS: REST

## 2017-10-18 NOTE — OP THERAPY EVALUATION
Outpatient Physical Therapy  INITIAL EVALUATION    [facility] Outpatient Physical Therapy  [address]  [place] [address]  Phone:  [phone]  Fax:  [phone]    Date of Evaluation: 10/18/2017    Patient: Adis Chance  YOB: 1980  MRN: [id number]     Referring Provider: Elmer Ellington M.D.  [id number]  [place] [address]   Referring Diagnosis Strain of other muscle(s) and tendon(s) at lower leg level, right leg, subsequent encounter [S86.811D]     Time Calculation                 Chief Complaint: Foot Problem (R calf strain. )    Visit Diagnoses     ICD-10-CM   1. Strain of other muscle(s) and tendon(s) at lower leg level, right leg, subsequent encounter S86.811D         Subjective:   History of Present Illness:     Date of onset:  2017    Mechanism of injury:  Patient is 37 y.o male with R calf strain. Approx Aug 4th during soft ball. Running to his right for a line drive. Was feeling a little bit better but re injured approx 3 weeks later. Reports it was bruised for a while but is better. Just limits him whenever he tries to be active.   Pain:     Current pain ratin    At best pain ratin    At worst pain ratin    Location:  R calf     Quality:  Pulling    Pain timing:  When active    Relieving factors:  Ice and rest    Aggravating factors:  Running    Pain Comments::  Pain only present during active/sports/softball.   Patient Goals:     Patient goals for therapy:  Increased strength and return to sport/leisure activities      Past Medical History:   Diagnosis Date   • GERD (gastroesophageal reflux disease)    • IBS (irritable bowel syndrome)    • Thyroid disease    • Tourette's      No past surgical history on file.  Social History   Substance Use Topics   • Smoking status: Never Smoker   • Smokeless tobacco: Never Used   • Alcohol use Yes     Family and Occupational History     Social History   • Marital status: Single      "Spouse name: N/A   • Number of children: N/A   • Years of education: N/A       Current Outpatient Prescriptions:   •  clonidine, 0.3 mg, Oral, TID + HS  •  rabeprazole, 20 mg, Oral, QDAY  •  triamcinolone acetonide, 1 Application, Topical, BID (Patient not taking: Reported on 10/3/2017), Not Taking at Unknown time  •  witch hazel-glycerin, i pad applied bid, not taking  •  ondansetron, 4 mg, Oral, Q4HRS PRN, prn    Objective     Observations     Additional Observation Details  Measurements  L calf  Calf 6\" down from base of patella: 14\"     R Calf  Calf 6\" from patella 14.25\"      Palpation   Left   No palpable tenderness to the lateral gastrocnemius, medial gastrocnemius and soleus.     Right   No palpable tenderness to the lateral gastrocnemius and soleus.   Hypertonic in the medial gastrocnemius.   Muscle spasm in the medial gastrocnemius.   Tenderness of the medial gastrocnemius.     Additional Palpation Details  Palpable knot/increased muscle tension in medial gastroc     Passive Range of Motion   Left Ankle/Foot    Dorsiflexion (ke): 15 degrees   Plantar flexion: WFL  Inversion: WFL  Eversion: WFL    Right Ankle/Foot    Dorsiflexion (ke): 10 degrees   Plantar flexion: WFL  Inversion: WFL  Eversion: WFL    Additional Passive Range of Motion Details  Shows poor functional range via squat. Unable to keep heels down.     Joint Play   Left Ankle/Foot  Joints within functional limits are the midfoot and forefoot.     Right Ankle/Foot  Joints within functional limits are the midfoot and forefoot.     Strength:      Left Ankle/Foot   Normal strength  Dorsiflexion: 5  Plantar flexion: 5  Inversion: 5  Eversion: 5  Great toe flexion: 5  Great toe extension: 5    Right Ankle/Foot   Normal strength  Dorsiflexion: 5  Plantar flexion: 5  Inversion: 5  Eversion: 5  Great toe flexion: 5  Great toe extension: 5    Swelling   Left Ankle/Foot   Figure 8: 21.25 cm  Malleoli: 10.25 cm    Right Ankle/Foot   Figure 8: 21.25 " cm  Malleoli: 10.25 cm        Treatments:    1. Develop home exercise program, 15, R calf    2. Electrical stimulation (Russian current), 15, R calf, with plantar flexion mvmt    Treatment Summary: HEP;  Ball calf roll/mobilzation  Gastroc stretch  Calf raises with foot plantarflexed   Squat/bottom hold    Picture given      Assessment, Response and Plan:   Assessment details:  37 year old with R calf strain in early Aug during softball game. Did nothing but re strained 3 weeks later. Cannot resume sport/leisure activities without pain. Some swelling in gastroc and some limited ankle DF range on R compared to left. Would benefit from short trial of therapy to help with return to sport activities.   Barriers to therapy:  Financial and cognition  Prognosis: good    Goals:   Short Term Goals:   1. Establish HEP   2. Increased R DF to 15 deg or equal of L LE  3. Decreased swelling in R calf compared to L  4. Client to show good eccentric contol during squat mechanics       Long Term Goals:    1. Ind in HEP  2. Decreased LEFS by MCID  3. Return to sport without restriction     Plan:   Therapy options:  Physical therapy treatment to continue  Planned therapy interventions:  Gait training, manual therapy, neuromuscular re-education, patient education, therapeutic exercise, home exercise program and modalities  Frequency:  2x week  Duration in visits:  20  Duration in weeks:  10  Plan details:  PT 1-2 per week. Some potential financial constrictions as he is not working at this time.       Functional Limitation G-Codes and Severity Modifiers  Current:     Goal:         Referring provider co-signature:  I have reviewed this plan of care and my co-signature certifies the need for services.  Certification Dates:   From 10/18/17   To 1/18/17    Physician Signature: ________________________________ Date: ______________

## 2017-11-30 ENCOUNTER — OFFICE VISIT (OUTPATIENT)
Dept: MEDICAL GROUP | Facility: MEDICAL CENTER | Age: 37
End: 2017-11-30
Payer: COMMERCIAL

## 2017-11-30 VITALS
RESPIRATION RATE: 16 BRPM | HEIGHT: 70 IN | WEIGHT: 175.49 LBS | OXYGEN SATURATION: 98 % | HEART RATE: 104 BPM | SYSTOLIC BLOOD PRESSURE: 134 MMHG | TEMPERATURE: 97.4 F | DIASTOLIC BLOOD PRESSURE: 76 MMHG | BODY MASS INDEX: 25.12 KG/M2

## 2017-11-30 DIAGNOSIS — K64.9 ACUTE HEMORRHOID: ICD-10-CM

## 2017-11-30 DIAGNOSIS — D50.0 IRON DEFICIENCY ANEMIA DUE TO CHRONIC BLOOD LOSS: ICD-10-CM

## 2017-11-30 DIAGNOSIS — K92.1 MELENA: ICD-10-CM

## 2017-11-30 DIAGNOSIS — K62.5 RECTAL BLEEDING: ICD-10-CM

## 2017-11-30 DIAGNOSIS — F42.9 OBSESSIVE-COMPULSIVE DISORDER, UNSPECIFIED TYPE: ICD-10-CM

## 2017-11-30 DIAGNOSIS — K21.9 GASTROESOPHAGEAL REFLUX DISEASE, ESOPHAGITIS PRESENCE NOT SPECIFIED: ICD-10-CM

## 2017-11-30 PROCEDURE — 99214 OFFICE O/P EST MOD 30 MIN: CPT | Performed by: PHYSICIAN ASSISTANT

## 2017-11-30 RX ORDER — RABEPRAZOLE SODIUM 20 MG/1
20 TABLET, DELAYED RELEASE ORAL 2 TIMES DAILY
Qty: 60 TAB | Refills: 3 | Status: SHIPPED | OUTPATIENT
Start: 2017-11-30 | End: 2018-12-27

## 2017-11-30 NOTE — PROGRESS NOTES
"Subjective:   Adis Chance is a 37 y.o. male here today for continued rectal bleeding with a new onset condition of possible melena.    Rectal bleeding  This is a 37-year-old male who continues to have bright red blood per rectum. Associated hard stool. He states for the past 3 days has had dark tarry stool. Colonoscopy performed a few months ago showed an anal fissure. Grade 3 internal hemorrhoids and rectal spasms. He was ordered for testing but he has not had them performed. He understands that his OCD is created a process where he sat on the toilet for extended periods trying to pass stool it doesn't exist. I have referred him to psychiatry but he never followed through. He never received any letter. Also complains of heartburn and reflux symptoms. Is on AcipHex but still has exacerbation of his symptoms daily. Complains of associated nauseous this. When he coughs he will develop reflux. He has not had an upper endoscopy. Labs in October showed iron deficiency anemia. Currently he's had issues with dizziness. Associated weakness.       Current medicines (including changes today)  Current Outpatient Prescriptions   Medication Sig Dispense Refill   • rabeprazole (ACIPHEX) 20 MG tablet Take 1 Tab by mouth 2 Times a Day. 60 Tab 3   • clonidine (CATAPRESS) 0.3 MG Tab TAKE 1 TAB BY MOUTH 3 TIMES A DAY AND AT BEDTIME. 90 Tab 1   • ondansetron (ZOFRAN) 4 MG Tab tablet Take 1 Tab by mouth every four hours as needed for Nausea/Vomiting. 30 Tab 1     No current facility-administered medications for this visit.      He  has a past medical history of GERD (gastroesophageal reflux disease); IBS (irritable bowel syndrome); Thyroid disease; and Tourette's.    ROS   No chest pain, no shortness of breath, no abdominal pain and all other systems were reviewed and are negative.       Objective:     Blood pressure 134/76, pulse (!) 104, temperature 36.3 °C (97.4 °F), resp. rate 16, height 1.778 m (5' 10\"), weight 79.6 kg (175 lb " 7.8 oz), SpO2 98 %. Body mass index is 25.18 kg/m².   Physical Exam:  Constitutional: Alert, no distress.  Skin: Warm, dry, good turgor, no rashes in visible areas.  Eye: Equal, round and reactive, positive scleral icterus, conjunctiva clear, lids normal.  ENMT: Lips without lesions, good dentition, oropharynx clear.  Neck: Trachea midline, no masses.   Lymph: No cervical or supraclavicular lymphadenopathy  Respiratory: Unlabored respiratory effort, lungs clear to auscultation, no wheezes, no ronchi.  Cardiovascular: Normal S1, S2, no murmur, no edema.  Abdomen: Soft, non-tender, no masses.  Psych: Alert and oriented x3, normal affect and mood.        Assessment and Plan:   The following treatment plan was discussed    1. Rectal bleeding  Chronic condition with recent exacerbation. Referred back to GI. Advised to contact GI. Also referred to general surgery. Referred to psychiatry to adjust OCD.    2. Obsessive-compulsive disorder, unspecified type  Chronic condition. Referred again to psychiatry. Provided information to contact [facility].  - REFERRAL TO PSYCHIATRY    3. Iron deficiency anemia due to chronic blood loss  Acute, new onset condition. Repeat CBC, and iron and ferritin.  - CBC WITH DIFFERENTIAL; Future  - IRON; Future  - FERRITIN; Future  - REFERRAL TO GASTROENTEROLOGY    4. Melena  Acute, new onset condition. Ordered labs. Referred to GI.  - CBC WITH DIFFERENTIAL; Future  - IRON; Future  - FERRITIN; Future  - REFERRAL TO GASTROENTEROLOGY    5. Acute hemorrhoid  Chronic condition. Referred to GI and general surgery. Also referred to psychiatry in hopes of controlling OCD.  - CBC WITH DIFFERENTIAL; Future  - IRON; Future  - FERRITIN; Future  - REFERRAL TO GENERAL SURGERY  - REFERRAL TO GASTROENTEROLOGY    6. Gastroesophageal reflux disease, esophagitis presence not specified  Chronic condition with recent exacerbation. We'll try AcipHex 20 mg twice a day. Referred to gastroenterology  for an upper endoscopy.  - rabeprazole (ACIPHEX) 20 MG tablet; Take 1 Tab by mouth 2 Times a Day.  Dispense: 60 Tab; Refill: 3  - REFERRAL TO GASTROENTEROLOGY      Followup: Return if symptoms worsen or fail to improve.    Please note that this dictation was created using voice recognition software. I have made every reasonable attempt to correct obvious errors, but I expect that there are errors of grammar and possibly content that I did not discover before finalizing the note.

## 2017-11-30 NOTE — ASSESSMENT & PLAN NOTE
This is a 37-year-old male who continues to have bright red blood per rectum. Associated hard stool. He states for the past 3 days has had dark tarry stool. Colonoscopy performed a few months ago showed an anal fissure. Grade 3 internal hemorrhoids and rectal spasms. He was ordered for testing but he has not had them performed. He understands that his OCD is created a process where he sat on the toilet for extended periods trying to pass stool it doesn't exist. I have referred him to psychiatry but he never followed through. He never received any letter. Also complains of heartburn and reflux symptoms. Is on AcipHex but still has exacerbation of his symptoms daily. Complains of associated nauseous this. When he coughs he will develop reflux. He has not had an upper endoscopy. Labs in October showed iron deficiency anemia. Currently he's had issues with dizziness. Associated weakness.

## 2017-12-04 ENCOUNTER — TELEPHONE (OUTPATIENT)
Dept: MEDICAL GROUP | Facility: MEDICAL CENTER | Age: 37
End: 2017-12-04

## 2017-12-04 NOTE — TELEPHONE ENCOUNTER
----- Message from Freddie Arrieta P.A.-C. sent at 12/4/2017  2:27 PM PST -----  Please contact Adis.  His iron levels are very low.  He is losing blood.  Start OTC iron supplement daily.  Must f/u with GI asap and go to ER with worsening symptoms.  Continue PPI daily.  Thank you.    Freddie    ----- Message -----  From: Joelle Brothers  Sent: 12/4/2017   2:12 PM  To: Freddie Arrieta P.A.-C.

## 2017-12-04 NOTE — TELEPHONE ENCOUNTER
Phone Number Called: [phone] (home)     Message: left message for patient to call back.    Left Message for patient to call back: yes

## 2017-12-06 NOTE — TELEPHONE ENCOUNTER
Phone Number Called: [phone] (home)     Message: Called and spoke with patient and mother about the provider's message. He will start iron supplements and they will contact [facility] for an appointment.    Left Message for patient to call back: no

## 2017-12-13 DIAGNOSIS — F95.2 TOURETTE DISORDER: ICD-10-CM

## 2017-12-13 RX ORDER — CLONIDINE HYDROCHLORIDE 0.3 MG/1
TABLET ORAL
Qty: 90 TAB | Refills: 1 | Status: SHIPPED | OUTPATIENT
Start: 2017-12-13 | End: 2018-02-13 | Stop reason: SDUPTHER

## 2018-01-03 ENCOUNTER — OFFICE VISIT (OUTPATIENT)
Dept: MEDICAL GROUP | Facility: PHYSICIAN GROUP | Age: 38
End: 2018-01-03
Payer: COMMERCIAL

## 2018-01-03 VITALS
TEMPERATURE: 97.7 F | HEIGHT: 70 IN | HEART RATE: 72 BPM | OXYGEN SATURATION: 100 % | DIASTOLIC BLOOD PRESSURE: 90 MMHG | BODY MASS INDEX: 25.2 KG/M2 | SYSTOLIC BLOOD PRESSURE: 132 MMHG | WEIGHT: 176 LBS

## 2018-01-03 DIAGNOSIS — Z28.20 VACCINE REFUSED BY PATIENT: ICD-10-CM

## 2018-01-03 DIAGNOSIS — J06.9 UPPER RESPIRATORY INFECTION, VIRAL: ICD-10-CM

## 2018-01-03 DIAGNOSIS — J02.9 SORE THROAT: ICD-10-CM

## 2018-01-03 DIAGNOSIS — R05.9 COUGH: ICD-10-CM

## 2018-01-03 LAB
FLUAV+FLUBV AG SPEC QL IA: NORMAL
INT CON NEG: NEGATIVE
INT CON NEG: NEGATIVE
INT CON POS: POSITIVE
INT CON POS: POSITIVE
S PYO AG THROAT QL: NORMAL

## 2018-01-03 PROCEDURE — 99214 OFFICE O/P EST MOD 30 MIN: CPT | Performed by: NURSE PRACTITIONER

## 2018-01-03 PROCEDURE — 87804 INFLUENZA ASSAY W/OPTIC: CPT | Performed by: NURSE PRACTITIONER

## 2018-01-03 PROCEDURE — 87880 STREP A ASSAY W/OPTIC: CPT | Performed by: NURSE PRACTITIONER

## 2018-01-03 RX ORDER — CODEINE PHOSPHATE AND GUAIFENESIN 10; 100 MG/5ML; MG/5ML
5 SOLUTION ORAL EVERY 4 HOURS PRN
Qty: 120 ML | Refills: 0 | Status: SHIPPED | OUTPATIENT
Start: 2018-01-03 | End: 2018-01-10

## 2018-01-03 RX ORDER — CYCLOBENZAPRINE HCL 5 MG
TABLET ORAL
Refills: 0 | COMMUNITY
Start: 2017-10-26 | End: 2018-12-27

## 2018-01-03 NOTE — PROGRESS NOTES
Subjective:     Adis Chance is a 37 y.o. male here today for new onset upper respiratory symptoms. Patient reports on 1/1/2018, he began to have a cough and sore throat. He also reports he has been feeling a little SOB at times. Reports at times he has sweats and chills. He has been having trouble sleeping due to cough.   He does admit to a history of anxiety which makes his symptoms worse per his report.     He is here with his mother today.     This is a new problem.   Symptoms include see above.    Onset 2 days.    Patient denies the following symptoms N/V.   Family members/coworker sick with similar symptoms: reports his girlfriend has similar symptoms.     Past Respiratory hx: No significant past medical history  Smoker status:   History   Smoking Status   • Never Smoker   Smokeless Tobacco   • Never Used       No problem-specific Assessment & Plan notes found for this encounter.      Current medicines (including changes today)  Current Outpatient Prescriptions   Medication Sig Dispense Refill   • cyclobenzaprine (FLEXERIL) 5 MG tablet TAKE 1 TAB ORALLY 3 TIMES A DAY X 5 DAYS, AS NEEDED - FOR MUSCLE SPASM  0   • clonidine (CATAPRESS) 0.3 MG Tab TAKE 1 TAB BY MOUTH 3 TIMES A DAY AND AT BEDTIME.*MYLAN BRAND ONLY* 90 Tab 1   • rabeprazole (ACIPHEX) 20 MG tablet Take 1 Tab by mouth 2 Times a Day. 60 Tab 3   • ondansetron (ZOFRAN) 4 MG Tab tablet Take 1 Tab by mouth every four hours as needed for Nausea/Vomiting. 30 Tab 1     No current facility-administered medications for this visit.        He  has a past medical history of GERD (gastroesophageal reflux disease); IBS (irritable bowel syndrome); Thyroid disease; and Tourette's.    He  has no past surgical history on file.     Social History     Social History   • Marital status: Single     Spouse name: N/A   • Number of children: N/A   • Years of education: N/A     Social History Main Topics   • Smoking status: Never Smoker   • Smokeless tobacco: Never Used   •  "Alcohol use Yes      Comment: occ   • Drug use: No   • Sexual activity: No     Other Topics Concern   • Not on file     Social History Narrative   • No narrative on file       History reviewed. No pertinent family history.      ROS  Positive for cough, sore throat, chills, body aches.   No fever, weight loss.   No rash.   No eye redness, eye pain.   Negative for SOB, wheezing, cough.   No chest pain, palpitations, dizziness.   No abdominal pain.     All other systems reviewed and are negative.        Objective:     Blood pressure 132/90, pulse 72, temperature 36.5 °C (97.7 °F), height 1.778 m (5' 10\"), weight 79.8 kg (176 lb), SpO2 100 %. Body mass index is 25.25 kg/m².    Physical Exam:   Constitutional: Alert, no distress. Patient not toxic or lethargic.   Eye: Equal, round and reactive, conjunctiva clear, lids normal.   ENMT: Lips without lesions, good dentition, oropharynx clear. TMs normal, without erythema. No nasal drainage, normal appearance of external nose.   Neck: Trachea midline, no masses. No cervical or supraclavicular lymphadenopathy  Respiratory: Unlabored respiratory effort, lungs clear to auscultation, no wheezes, no ronchi.  Cardiovascular: Normal S1, S2, no murmur, no edema.   Abdomen: Soft, non-tender, no masses, no hepatosplenomegaly. Normal bowel sounds.   Skin: Warm, dry, good turgor, no rashes in visible areas.  Psych: Alert and oriented x3, he does appear mildly anxious at times, however, after waiting for testing to return, appears to have normal mood and affect.         Assessment and Plan:   The following treatment plan was discussed    1. Upper respiratory infection, viral  Likely viral. Advised to get rest, he was concerned with his BP, however, with recheck and while waiting in office, he seems to be doing better. Advise to report to ER for concerning symptoms of chest pain or SOB. His O2 is 100%. It appears his anxiety may be worsening his symptoms. Increase fluids, rest. Reviewed " and completed [facility] Controlled substance form.  reveals no concerns.   - guaifenesin-codeine (ROBITUSSIN AC) Solution oral solution; Take 5 mL by mouth every four hours as needed for Cough for up to 7 days.  Dispense: 120 mL; Refill: 0    2. Cough  Neg flu.   - POCT Influenza A/B  - guaifenesin-codeine (ROBITUSSIN AC) Solution oral solution; Take 5 mL by mouth every four hours as needed for Cough for up to 7 days.  Dispense: 120 mL; Refill: 0  - Controlled Substance Treatment Agreement    3. Sore throat  Neg strep  - POCT Rapid Strep A    4. Vaccine refused by patient  Declines flu and Tdap until he is feeling better. Advised to f/u with PCP.     Advised patient to rest, increase fluids   Discussed s/s to seek emergent care.  RTC if symptoms worsen or do not resolve.     Reviewed indication, dosage, usage and potential adverse effects of prescribed medications. Patient appears to understand, verbalizes understanding and is willing to try medications as prescribed.      Reviewed risks and benefits of treatment plan. Patient verbally agrees to plan of care.       Followup: Return if symptoms worsen or fail to improve.    JODI Grey.     PLEASE NOTE: This dictation was created using voice recognition software. I have made every reasonable attempt to correct obvious errors, but I expect that there may be errors of grammar and possibly content that I did not discover prior finalizing this note.

## 2018-02-13 DIAGNOSIS — F95.2 TOURETTE DISORDER: ICD-10-CM

## 2018-02-13 RX ORDER — CLONIDINE HYDROCHLORIDE 0.3 MG/1
TABLET ORAL
Qty: 90 TAB | Refills: 1 | Status: SHIPPED | OUTPATIENT
Start: 2018-02-13 | End: 2018-04-12 | Stop reason: SDUPTHER

## 2018-03-23 RX ORDER — RABEPRAZOLE SODIUM 20 MG/1
TABLET, DELAYED RELEASE ORAL
Qty: 30 TAB | Refills: 5 | Status: SHIPPED | OUTPATIENT
Start: 2018-03-23 | End: 2019-05-31 | Stop reason: SDUPTHER

## 2018-04-12 DIAGNOSIS — F95.2 TOURETTE DISORDER: ICD-10-CM

## 2018-04-13 RX ORDER — CLONIDINE HYDROCHLORIDE 0.3 MG/1
TABLET ORAL
Qty: 90 TAB | Refills: 1 | Status: SHIPPED | OUTPATIENT
Start: 2018-04-13 | End: 2018-06-07 | Stop reason: SDUPTHER

## 2018-05-15 DIAGNOSIS — F42.9 OBSESSIVE-COMPULSIVE DISORDER, UNSPECIFIED TYPE: ICD-10-CM

## 2018-05-15 DIAGNOSIS — F95.2 TOURETTE DISORDER: ICD-10-CM

## 2018-06-07 DIAGNOSIS — F95.2 TOURETTE DISORDER: ICD-10-CM

## 2018-06-07 RX ORDER — CLONIDINE HYDROCHLORIDE 0.3 MG/1
TABLET ORAL
Qty: 90 TAB | Refills: 1 | Status: SHIPPED | OUTPATIENT
Start: 2018-06-07 | End: 2018-07-30 | Stop reason: SDUPTHER

## 2018-07-30 DIAGNOSIS — F95.2 TOURETTE DISORDER: ICD-10-CM

## 2018-07-31 RX ORDER — CLONIDINE HYDROCHLORIDE 0.3 MG/1
TABLET ORAL
Qty: 90 TAB | Refills: 1 | Status: SHIPPED | OUTPATIENT
Start: 2018-07-31 | End: 2018-10-18 | Stop reason: SDUPTHER

## 2018-08-30 ENCOUNTER — TELEPHONE (OUTPATIENT)
Dept: PHYSICAL THERAPY | Facility: MEDICAL CENTER | Age: 38
End: 2018-08-30

## 2018-08-30 NOTE — OP THERAPY DISCHARGE SUMMARY
Outpatient Physical Therapy  DISCHARGE SUMMARY NOTE      [facility]  [address]  [place] [address]  Phone:  [phone]  Fax:  [phone]    Date of Visit: 08/30/2018    Patient: Adis Chance  YOB: 1980  MRN: [id number]     Referring Provider: No referring provider defined for this encounter.   Referring Diagnosis No admission diagnoses are documented for this encounter.         Functional Limitation G-Codes and Severity Modifiers      Goal:     Discharge:         Your patient is being discharged from Physical Therapy with the following comments:   · Patient has failed to schedule or reschedule follow-up visits  · Patient has been non-compliant with physical therapy plan of care    Comments:  Patient was seen for eval 10/18/17. He did not schedule any follow ups. Per protocol will be dc'd from PT services     Limitations Remaining:  Unknown    Recommendations:  HEP as able follow up with primary care as needed    Kamaljit Ruelas, PT, DPT    Date: 8/30/2018

## 2018-10-18 DIAGNOSIS — F95.2 TOURETTE DISORDER: ICD-10-CM

## 2018-10-18 RX ORDER — CLONIDINE HYDROCHLORIDE 0.3 MG/1
TABLET ORAL
Qty: 90 TAB | Refills: 1 | Status: SHIPPED | OUTPATIENT
Start: 2018-10-18 | End: 2018-12-27 | Stop reason: SDUPTHER

## 2018-12-18 RX ORDER — CLONIDINE HYDROCHLORIDE 0.1 MG/1
0.1 TABLET ORAL 2 TIMES DAILY
Qty: 60 TAB | Refills: 0 | Status: SHIPPED | OUTPATIENT
Start: 2018-12-18 | End: 2018-12-27

## 2018-12-18 RX ORDER — CLONIDINE HYDROCHLORIDE 0.1 MG/1
0.1 TABLET ORAL 2 TIMES DAILY
COMMUNITY
End: 2018-12-18 | Stop reason: SDUPTHER

## 2018-12-19 ENCOUNTER — TELEPHONE (OUTPATIENT)
Dept: SCHEDULING | Facility: IMAGING CENTER | Age: 38
End: 2018-12-19

## 2018-12-19 NOTE — TELEPHONE ENCOUNTER
Phone Number Called: [phone] (home)       Message: Called Adis back he said he was having chest pain when he would coughing and breathing hard. Patient was concerned about rapid heat beat and rib pain. I advised patient to be seen in Er or UC. Patient said he had rapid heart beat because he was not on correct dosage of clonidine. I informed patient that 0.1 mg of clonidine was sent to pharmacy on file and was ready for . Patient advised appt I offered to put him on wait list for uc he declined and said he would drop by later. Patient wanted to schedule with [facility] I told him [facility] didn't have opening until next week and due to symptoms wait would be to long. Patient confirmed understanding and still scheduled appt with Sh[facility] and will go to UC as well today.    Left Message for patient to call back: no

## 2018-12-27 ENCOUNTER — OFFICE VISIT (OUTPATIENT)
Dept: MEDICAL GROUP | Facility: MEDICAL CENTER | Age: 38
End: 2018-12-27
Payer: COMMERCIAL

## 2018-12-27 VITALS
TEMPERATURE: 97.4 F | SYSTOLIC BLOOD PRESSURE: 118 MMHG | DIASTOLIC BLOOD PRESSURE: 68 MMHG | BODY MASS INDEX: 26.32 KG/M2 | OXYGEN SATURATION: 100 % | WEIGHT: 183.86 LBS | HEIGHT: 70 IN | HEART RATE: 75 BPM | RESPIRATION RATE: 16 BRPM

## 2018-12-27 DIAGNOSIS — D50.0 IRON DEFICIENCY ANEMIA DUE TO CHRONIC BLOOD LOSS: ICD-10-CM

## 2018-12-27 DIAGNOSIS — R00.2 HEART PALPITATIONS: ICD-10-CM

## 2018-12-27 DIAGNOSIS — F95.2 TOURETTE DISORDER: ICD-10-CM

## 2018-12-27 DIAGNOSIS — F42.9 OBSESSIVE-COMPULSIVE DISORDER, UNSPECIFIED TYPE: ICD-10-CM

## 2018-12-27 PROCEDURE — 99214 OFFICE O/P EST MOD 30 MIN: CPT | Performed by: PHYSICIAN ASSISTANT

## 2018-12-27 RX ORDER — FLUTICASONE PROPIONATE 50 MCG
SPRAY, SUSPENSION (ML) NASAL
Refills: 0 | COMMUNITY
Start: 2018-10-08 | End: 2018-12-27

## 2018-12-27 RX ORDER — CLONIDINE HYDROCHLORIDE 0.3 MG/1
0.3 TABLET ORAL 3 TIMES DAILY
Qty: 270 TAB | Refills: 1 | Status: SHIPPED | OUTPATIENT
Start: 2018-12-27 | End: 2019-05-31 | Stop reason: SDUPTHER

## 2018-12-27 RX ORDER — OXYCODONE HYDROCHLORIDE AND ACETAMINOPHEN 5; 325 MG/1; MG/1
TABLET ORAL
Refills: 0 | COMMUNITY
Start: 2018-11-09 | End: 2018-12-27

## 2018-12-27 RX ORDER — AMOXICILLIN AND CLAVULANATE POTASSIUM 875; 125 MG/1; MG/1
1 TABLET, FILM COATED ORAL
Refills: 0 | COMMUNITY
Start: 2018-10-08 | End: 2018-12-27

## 2018-12-27 RX ORDER — IBUPROFEN 800 MG/1
TABLET ORAL
Refills: 0 | COMMUNITY
Start: 2018-10-08 | End: 2018-12-27

## 2018-12-27 NOTE — ASSESSMENT & PLAN NOTE
Complains of an intermittent history of irregular and rapid heart rate.  Symptoms usually will happen at nighttime.  Believes that his pulse is racing now.  Pulse currently sets at 75.  He has a significant family history of heart conditions.  Also has a history of anemia.  Has not followed up with anemia and repeating labs.  Was told to take iron but is not taking it.

## 2018-12-27 NOTE — ASSESSMENT & PLAN NOTE
I have tried repeatedly to refer him to psychiatry for his OCD regarding his bowel habits.  He is never followed up.

## 2018-12-27 NOTE — ASSESSMENT & PLAN NOTE
This is a 38-year-old male accompanied by his mother.  He states that the brand of clonidine that he is using currently is not active so he stopped using the medication.  He is requesting the [employer] brand but it is not available anymore.  Would like a prescription sent over to [facility] for another .  He takes 0.3 mg 3 tablets daily usually at nighttime because it will make him sleepy.  Medication helps control his Tourette's.  He complains of coughing otherwise.  He was recently seen at the [facility] emergency room and diagnosed with costochondritis on the right side of his ribs.  He was told to ice and stretch.  He did have an appendectomy in November but the symptoms occurred a month after.

## 2018-12-27 NOTE — PROGRESS NOTES
Subjective:   Adis Chance is a 38 y.o. male here today for Tourette's disorder, heart palpitations OCD and a history of anemia.    Tourette disorder  This is a 38-year-old male accompanied by his mother.  He states that the brand of clonidine that he is using currently is not active so he stopped using the medication.  He is requesting the [employer] brand but it is not available anymore.  Would like a prescription sent over to [facility] for another .  He takes 0.3 mg 3 tablets daily usually at nighttime because it will make him sleepy.  Medication helps control his Tourette's.  He complains of coughing otherwise.  He was recently seen at the [facility] emergency room and diagnosed with costochondritis on the right side of his ribs.  He was told to ice and stretch.  He did have an appendectomy in November but the symptoms occurred a month after.    Heart palpitations  Complains of an intermittent history of irregular and rapid heart rate.  Symptoms usually will happen at nighttime.  Believes that his pulse is racing now.  Pulse currently sets at 75.  He has a significant family history of heart conditions.  Also has a history of anemia.  Has not followed up with anemia and repeating labs.  Was told to take iron but is not taking it.    OCD (obsessive compulsive disorder)  I have tried repeatedly to refer him to psychiatry for his OCD regarding his bowel habits.  He is never followed up.       Current medicines (including changes today)  Current Outpatient Prescriptions   Medication Sig Dispense Refill   • cloNIDine (CATAPRESS) 0.3 MG Tab Take 1 Tab by mouth 3 times a day. 270 Tab 1   • rabeprazole (ACIPHEX) 20 MG tablet TAKE 1 TABLET BY MOUTH EVERY DAY 30 Tab 5     No current facility-administered medications for this visit.      He  has a past medical history of GERD (gastroesophageal reflux disease); IBS (irritable bowel syndrome); Thyroid disease; and Tourette's.    Social History and Family History were  "reviewed and updated.    ROS   No chest pain, no shortness of breath, no abdominal pain and all other systems were reviewed and are negative.       Objective:     Blood pressure 118/68, pulse 75, temperature 36.3 °C (97.4 °F), temperature source Temporal, resp. rate 16, height 1.778 m (5' 10\"), weight 83.4 kg (183 lb 13.8 oz), SpO2 100 %. Body mass index is 26.38 kg/m².   Physical Exam:  Constitutional: Alert, no distress.  Skin: Warm, dry, good turgor, no rashes in visible areas.  Eye: Equal, round and reactive, conjunctiva clear, lids normal.  ENMT: Lips without lesions, good dentition, oropharynx clear.  Neck: Trachea midline, no masses.   Lymph: No cervical or supraclavicular lymphadenopathy  Respiratory: Unlabored respiratory effort, lungs clear to auscultation, no wheezes, no ronchi.  Cardiovascular: Normal S1, S2, no murmur, no edema.  Abdomen: Soft, non-tender, no masses.  Psych: Alert and oriented x3, normal affect and mood.        Assessment and Plan:   The following treatment plan was discussed    1. Tourette disorder  Chronic condition.  Uncontrolled.  Referred to psychiatry.  Renewed clonidine 0.3 mg 3 tablets daily.  Sent the prescription to new pharmacy.  - cloNIDine (CATAPRESS) 0.3 MG Tab; Take 1 Tab by mouth 3 times a day.  Dispense: 270 Tab; Refill: 1  - REFERRAL TO PSYCHIATRY    2. Heart palpitations  New condition but chronic.  Unknown if symptoms are secondary to anemia.  Advised to perform labs for anemia.  Referred to cardiology to establish care per patient's request.  - REFERRAL TO CARDIOLOGY    3. Iron deficiency anemia due to chronic blood loss  Chronic condition status unknown.  Ordered labs.  We will try to obtain medical records from [facility].  - CBC WITH DIFFERENTIAL; Future  - IRON; Future  - FERRITIN; Future    4. Obsessive-compulsive disorder, unspecified type  Chronic condition.  Uncontrolled.  Refer to psychiatry for evaluation.  - REFERRAL TO PSYCHIATRY    Patient was seen " for 25 minutes face to face of which > 50% of appointment time was spent on counseling and coordination of care regarding the above.    Followup: Return in about 3 months (around 3/27/2019), or if symptoms worsen or fail to improve.    Please note that this dictation was created using voice recognition software. I have made every reasonable attempt to correct obvious errors, but I expect that there are errors of grammar and possibly content that I did not discover before finalizing the note.

## 2019-04-10 ENCOUNTER — APPOINTMENT (OUTPATIENT)
Dept: RADIOLOGY | Facility: IMAGING CENTER | Age: 39
End: 2019-04-10
Attending: FAMILY MEDICINE
Payer: COMMERCIAL

## 2019-04-10 ENCOUNTER — OFFICE VISIT (OUTPATIENT)
Dept: URGENT CARE | Facility: CLINIC | Age: 39
End: 2019-04-10
Payer: COMMERCIAL

## 2019-04-10 VITALS
SYSTOLIC BLOOD PRESSURE: 132 MMHG | RESPIRATION RATE: 14 BRPM | DIASTOLIC BLOOD PRESSURE: 96 MMHG | WEIGHT: 183 LBS | BODY MASS INDEX: 26.2 KG/M2 | HEART RATE: 90 BPM | HEIGHT: 70 IN | TEMPERATURE: 97.8 F | OXYGEN SATURATION: 96 %

## 2019-04-10 DIAGNOSIS — Z20.89 PNEUMONIA EXPOSURE: ICD-10-CM

## 2019-04-10 DIAGNOSIS — R05.9 COUGH: ICD-10-CM

## 2019-04-10 DIAGNOSIS — R49.0 HOARSENESS OF VOICE: ICD-10-CM

## 2019-04-10 PROCEDURE — 71046 X-RAY EXAM CHEST 2 VIEWS: CPT | Mod: TC | Performed by: FAMILY MEDICINE

## 2019-04-10 PROCEDURE — 99214 OFFICE O/P EST MOD 30 MIN: CPT | Performed by: FAMILY MEDICINE

## 2019-04-10 RX ORDER — BENZONATATE 100 MG/1
100 CAPSULE ORAL 3 TIMES DAILY PRN
Qty: 30 CAP | Refills: 0 | Status: SHIPPED | OUTPATIENT
Start: 2019-04-10 | End: 2019-05-31

## 2019-04-10 NOTE — PROGRESS NOTES
"Subjective:      Adis Chance is a 38 y.o. male who presents with Other (sore throat x 1 week, loss of voice x2days )            This is a new problem.  38-year-old non-smoker presenting with history of upper respiratory symptoms with mild congestion, cough and sore throat and loss of voice for the past 3-4 days.  No fever or chills reported.  No wheezing or shortness of breath.  He is here with his mother who has also similar symptoms.  His father just was hospitalized over the weekend with pneumonia.  He has not been using any over-the-counter medication.        Review of Systems   All other systems reviewed and are negative.         Objective:     /96   Pulse 90   Temp 36.6 °C (97.8 °F)   Resp 14   Ht 1.778 m (5' 10\")   Wt 83 kg (183 lb)   SpO2 96%   BMI 26.26 kg/m²      Physical Exam   Constitutional: He is oriented to person, place, and time. He appears well-developed and well-nourished.  Non-toxic appearance. No distress.   HENT:   Head: Normocephalic and atraumatic.   Right Ear: Tympanic membrane, external ear and ear canal normal.   Left Ear: Tympanic membrane, external ear and ear canal normal.   Nose: No rhinorrhea.   Mouth/Throat: Uvula is midline and oropharynx is clear and moist. No oral lesions. No trismus in the jaw. No uvula swelling. No oropharyngeal exudate, posterior oropharyngeal erythema or tonsillar abscesses.   Eyes: Conjunctivae are normal. No scleral icterus.   Neck: Neck supple.   Cardiovascular: Normal rate and regular rhythm.  Exam reveals no gallop and no friction rub.    No murmur heard.  Pulmonary/Chest: Effort normal. No stridor. No respiratory distress. He has decreased breath sounds in the right lower field. He has no wheezes. He has no rhonchi. He has no rales.   Lymphadenopathy:     He has no cervical adenopathy.   Neurological: He is alert and oriented to person, place, and time.   Skin: Skin is warm. No rash noted. He is not diaphoretic. No erythema. No pallor. "   Psychiatric: He has a normal mood and affect.        Chest x-rays negative for acute abnormalities on my read and confirmed also by radiology       Assessment/Plan:   ASSESSMENT:PLAN:  1. Hoarseness of voice    2. Cough  - DX-CHEST-2 VIEWS; Future  - benzonatate (TESSALON) 100 MG Cap; Take 1 Cap by mouth 3 times a day as needed for Cough.  Dispense: 30 Cap; Refill: 0    3. Pneumonia exposure    Likely a viral illness  Treatment is supportive  Inhaled steam discussed, resting voice, cough tablet as needed  Plan per orders and instructions  Warning signs reviewed

## 2019-04-10 NOTE — PATIENT INSTRUCTIONS
Your exam is otherwise reassuring  Chest x-ray is negative for acute abnormalities and no pneumonia seen.  Inhaled steam helps your voice box  Cough tablet as needed  Follow up if not significantly improved as expected, sooner if any worsening or new symptoms      Laryngitis  Introduction  Laryngitis is swelling (inflammation) of your vocal cords. This causes hoarseness, coughing, loss of voice, sore throat, or a dry throat. When your vocal cords are inflamed, your voice sounds different.  Laryngitis can be temporary (acute) or long-term (chronic). Most cases of acute laryngitis improve with time. Chronic laryngitis is laryngitis that lasts for more than three weeks.  Follow these instructions at home:  · Drink enough fluid to keep your pee (urine) clear or pale yellow.  · Breathe in moist air. Use a humidifier if you live in a dry climate.  · Take medicines only as told by your doctor.  · Do not smoke cigarettes or electronic cigarettes. If you need help quitting, ask your doctor.  · Talk as little as possible. Also avoid whispering, which can cause vocal strain.  · Write instead of talking. Do this until your voice is back to normal.  Contact a doctor if:  · You have a fever.  · Your pain is worse.  · You have trouble swallowing.  Get help right away if:  · You cough up blood.  · You have trouble breathing.  This information is not intended to replace advice given to you by your health care provider. Make sure you discuss any questions you have with your health care provider.  Document Released: 12/06/2012 Document Revised: 05/25/2017 Document Reviewed: 06/02/2015  © 2017 [employer]

## 2019-05-31 ENCOUNTER — OFFICE VISIT (OUTPATIENT)
Dept: MEDICAL GROUP | Facility: MEDICAL CENTER | Age: 39
End: 2019-05-31
Payer: COMMERCIAL

## 2019-05-31 ENCOUNTER — APPOINTMENT (OUTPATIENT)
Dept: MEDICAL GROUP | Facility: MEDICAL CENTER | Age: 39
End: 2019-05-31
Payer: COMMERCIAL

## 2019-05-31 VITALS
DIASTOLIC BLOOD PRESSURE: 82 MMHG | HEIGHT: 70 IN | RESPIRATION RATE: 16 BRPM | BODY MASS INDEX: 25.91 KG/M2 | TEMPERATURE: 98.1 F | OXYGEN SATURATION: 99 % | HEART RATE: 70 BPM | WEIGHT: 181 LBS | SYSTOLIC BLOOD PRESSURE: 110 MMHG

## 2019-05-31 DIAGNOSIS — E03.8 SUBCLINICAL HYPOTHYROIDISM: ICD-10-CM

## 2019-05-31 DIAGNOSIS — K21.9 GASTROESOPHAGEAL REFLUX DISEASE, ESOPHAGITIS PRESENCE NOT SPECIFIED: ICD-10-CM

## 2019-05-31 DIAGNOSIS — M54.2 NECK PAIN, CHRONIC: ICD-10-CM

## 2019-05-31 DIAGNOSIS — D50.0 IRON DEFICIENCY ANEMIA DUE TO CHRONIC BLOOD LOSS: ICD-10-CM

## 2019-05-31 DIAGNOSIS — F32.A DEPRESSION, UNSPECIFIED DEPRESSION TYPE: ICD-10-CM

## 2019-05-31 DIAGNOSIS — G89.29 NECK PAIN, CHRONIC: ICD-10-CM

## 2019-05-31 DIAGNOSIS — K62.5 RECTAL BLEEDING: ICD-10-CM

## 2019-05-31 DIAGNOSIS — F42.9 OBSESSIVE-COMPULSIVE DISORDER, UNSPECIFIED TYPE: ICD-10-CM

## 2019-05-31 DIAGNOSIS — F95.2 TOURETTE DISORDER: ICD-10-CM

## 2019-05-31 PROBLEM — K64.9 ACUTE HEMORRHOID: Status: RESOLVED | Noted: 2017-11-30 | Resolved: 2019-05-31

## 2019-05-31 PROBLEM — J10.1 INFLUENZA B: Status: RESOLVED | Noted: 2017-04-28 | Resolved: 2019-05-31

## 2019-05-31 PROBLEM — K92.1 MELENA: Status: RESOLVED | Noted: 2017-11-30 | Resolved: 2019-05-31

## 2019-05-31 PROCEDURE — 99214 OFFICE O/P EST MOD 30 MIN: CPT | Performed by: PHYSICIAN ASSISTANT

## 2019-05-31 RX ORDER — RABEPRAZOLE SODIUM 20 MG/1
20 TABLET, DELAYED RELEASE ORAL 2 TIMES DAILY
Qty: 60 TAB | Refills: 11 | Status: SHIPPED | OUTPATIENT
Start: 2019-05-31 | End: 2020-02-11

## 2019-05-31 RX ORDER — CLONIDINE HYDROCHLORIDE 0.3 MG/1
0.3 TABLET ORAL 3 TIMES DAILY
Qty: 270 TAB | Refills: 1 | Status: SHIPPED
Start: 2019-05-31 | End: 2019-12-13 | Stop reason: SDUPTHER

## 2019-05-31 ASSESSMENT — PATIENT HEALTH QUESTIONNAIRE - PHQ9
CLINICAL INTERPRETATION OF PHQ2 SCORE: 6
5. POOR APPETITE OR OVEREATING: 1 - SEVERAL DAYS
SUM OF ALL RESPONSES TO PHQ QUESTIONS 1-9: 14

## 2019-05-31 NOTE — ASSESSMENT & PLAN NOTE
In the past his thyroid was slightly elevated.  TSH value was elevated.  Never got it retested.  Complains of fatigue.
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
## 2019-05-31 NOTE — ASSESSMENT & PLAN NOTE
Still feels depressed.  Does not have a job.  Girlfriend does not want to be in a relationship any longer.  She is older than he is.  He though has no ability to find a job currently.  Has no money.  Lives with his parents.

## 2019-05-31 NOTE — ASSESSMENT & PLAN NOTE
This is a 39-year-old male who is here today to discuss several of his chronic medical conditions.  Has chronic reflux symptoms.  Associated nauseousness.  The past was on AcipHex 20 mg.  Through his appointments with GI the dose was increased to twice daily use.  States the medication is helpful.  Does have occasional nausea and vomiting.

## 2019-05-31 NOTE — ASSESSMENT & PLAN NOTE
Chronic history of iron deficiency anemia secondary to hemorrhoids.  States every other month he will have exacerbation of symptoms.  He is taking 1 tablet of iron supplement daily.  Still feels significant fatigue.  Saw GI but there is little they could do because of his OCD.

## 2019-05-31 NOTE — ASSESSMENT & PLAN NOTE
Chronic history of Tourette's disorder.  Takes clonidine 0.3 mg 3 times a day.  Symptoms are stable.  Occasionally will have a cough that is a manifestation of his Tourette's.  I had referred him to psychiatry previously but he never followed up.

## 2019-06-07 ENCOUNTER — OFFICE VISIT (OUTPATIENT)
Dept: MEDICAL GROUP | Facility: PHYSICIAN GROUP | Age: 39
End: 2019-06-07
Payer: COMMERCIAL

## 2019-06-07 ENCOUNTER — HOSPITAL ENCOUNTER (OUTPATIENT)
Dept: LAB | Facility: MEDICAL CENTER | Age: 39
End: 2019-06-07
Attending: INTERNAL MEDICINE
Payer: COMMERCIAL

## 2019-06-07 ENCOUNTER — HOSPITAL ENCOUNTER (OUTPATIENT)
Dept: LAB | Facility: MEDICAL CENTER | Age: 39
End: 2019-06-07
Attending: PHYSICIAN ASSISTANT
Payer: COMMERCIAL

## 2019-06-07 VITALS
OXYGEN SATURATION: 98 % | WEIGHT: 178.35 LBS | BODY MASS INDEX: 25.53 KG/M2 | HEART RATE: 96 BPM | SYSTOLIC BLOOD PRESSURE: 110 MMHG | HEIGHT: 70 IN | TEMPERATURE: 97.5 F | RESPIRATION RATE: 16 BRPM | DIASTOLIC BLOOD PRESSURE: 78 MMHG

## 2019-06-07 DIAGNOSIS — F95.2 TOURETTE DISORDER: ICD-10-CM

## 2019-06-07 DIAGNOSIS — E03.8 SUBCLINICAL HYPOTHYROIDISM: ICD-10-CM

## 2019-06-07 DIAGNOSIS — Z11.4 SCREENING FOR HIV (HUMAN IMMUNODEFICIENCY VIRUS): ICD-10-CM

## 2019-06-07 DIAGNOSIS — D50.0 IRON DEFICIENCY ANEMIA DUE TO CHRONIC BLOOD LOSS: ICD-10-CM

## 2019-06-07 DIAGNOSIS — F32.A DEPRESSION, UNSPECIFIED DEPRESSION TYPE: ICD-10-CM

## 2019-06-07 DIAGNOSIS — G47.09 OTHER INSOMNIA: ICD-10-CM

## 2019-06-07 DIAGNOSIS — K21.9 GASTROESOPHAGEAL REFLUX DISEASE, ESOPHAGITIS PRESENCE NOT SPECIFIED: ICD-10-CM

## 2019-06-07 DIAGNOSIS — F42.9 OBSESSIVE-COMPULSIVE DISORDER, UNSPECIFIED TYPE: ICD-10-CM

## 2019-06-07 DIAGNOSIS — K62.5 RECTAL BLEEDING: ICD-10-CM

## 2019-06-07 PROBLEM — D64.9 ANEMIA: Status: ACTIVE | Noted: 2019-06-07

## 2019-06-07 LAB
ALBUMIN SERPL BCP-MCNC: 4.2 G/DL (ref 3.2–4.9)
ALBUMIN/GLOB SERPL: 1.2 G/DL
ALP SERPL-CCNC: 80 U/L (ref 30–99)
ALT SERPL-CCNC: 38 U/L (ref 2–50)
ANION GAP SERPL CALC-SCNC: 13 MMOL/L (ref 0–11.9)
AST SERPL-CCNC: 79 U/L (ref 12–45)
BASOPHILS # BLD AUTO: 2.1 % (ref 0–1.8)
BASOPHILS # BLD: 0.08 K/UL (ref 0–0.12)
BILIRUB SERPL-MCNC: 0.8 MG/DL (ref 0.1–1.5)
BUN SERPL-MCNC: 7 MG/DL (ref 8–22)
CALCIUM SERPL-MCNC: 9.2 MG/DL (ref 8.5–10.5)
CHLORIDE SERPL-SCNC: 104 MMOL/L (ref 96–112)
CHOLEST SERPL-MCNC: 200 MG/DL (ref 100–199)
CO2 SERPL-SCNC: 24 MMOL/L (ref 20–33)
CREAT SERPL-MCNC: 1.09 MG/DL (ref 0.5–1.4)
EOSINOPHIL # BLD AUTO: 0.12 K/UL (ref 0–0.51)
EOSINOPHIL NFR BLD: 3.1 % (ref 0–6.9)
ERYTHROCYTE [DISTWIDTH] IN BLOOD BY AUTOMATED COUNT: 53.3 FL (ref 35.9–50)
FERRITIN SERPL-MCNC: 10.7 NG/ML (ref 22–322)
GLOBULIN SER CALC-MCNC: 3.5 G/DL (ref 1.9–3.5)
GLUCOSE SERPL-MCNC: 87 MG/DL (ref 65–99)
HCT VFR BLD AUTO: 38.7 % (ref 42–52)
HDLC SERPL-MCNC: 98 MG/DL
HGB BLD-MCNC: 11.5 G/DL (ref 14–18)
HIV 1+2 AB+HIV1 P24 AG SERPL QL IA: NON REACTIVE
IMM GRANULOCYTES # BLD AUTO: 0.01 K/UL (ref 0–0.11)
IMM GRANULOCYTES NFR BLD AUTO: 0.3 % (ref 0–0.9)
LDLC SERPL CALC-MCNC: 92 MG/DL
LYMPHOCYTES # BLD AUTO: 1.24 K/UL (ref 1–4.8)
LYMPHOCYTES NFR BLD: 32.1 % (ref 22–41)
MCH RBC QN AUTO: 25.7 PG (ref 27–33)
MCHC RBC AUTO-ENTMCNC: 29.7 G/DL (ref 33.7–35.3)
MCV RBC AUTO: 86.6 FL (ref 81.4–97.8)
MONOCYTES # BLD AUTO: 0.46 K/UL (ref 0–0.85)
MONOCYTES NFR BLD AUTO: 11.9 % (ref 0–13.4)
NEUTROPHILS # BLD AUTO: 1.95 K/UL (ref 1.82–7.42)
NEUTROPHILS NFR BLD: 50.5 % (ref 44–72)
NRBC # BLD AUTO: 0 K/UL
NRBC BLD-RTO: 0 /100 WBC
PLATELET # BLD AUTO: 375 K/UL (ref 164–446)
PMV BLD AUTO: 9.3 FL (ref 9–12.9)
POTASSIUM SERPL-SCNC: 3.6 MMOL/L (ref 3.6–5.5)
PROT SERPL-MCNC: 7.7 G/DL (ref 6–8.2)
RBC # BLD AUTO: 4.47 M/UL (ref 4.7–6.1)
SODIUM SERPL-SCNC: 141 MMOL/L (ref 135–145)
T4 FREE SERPL-MCNC: 0.62 NG/DL (ref 0.53–1.43)
TRIGL SERPL-MCNC: 49 MG/DL (ref 0–149)
TSH SERPL DL<=0.005 MIU/L-ACNC: 6.68 UIU/ML (ref 0.38–5.33)
TSH SERPL DL<=0.005 MIU/L-ACNC: 6.82 UIU/ML (ref 0.38–5.33)
WBC # BLD AUTO: 3.9 K/UL (ref 4.8–10.8)

## 2019-06-07 PROCEDURE — 84439 ASSAY OF FREE THYROXINE: CPT

## 2019-06-07 PROCEDURE — 84443 ASSAY THYROID STIM HORMONE: CPT

## 2019-06-07 PROCEDURE — 84439 ASSAY OF FREE THYROXINE: CPT | Mod: 91

## 2019-06-07 PROCEDURE — 36415 COLL VENOUS BLD VENIPUNCTURE: CPT

## 2019-06-07 PROCEDURE — 85025 COMPLETE CBC W/AUTO DIFF WBC: CPT

## 2019-06-07 PROCEDURE — 82728 ASSAY OF FERRITIN: CPT

## 2019-06-07 PROCEDURE — 87389 HIV-1 AG W/HIV-1&-2 AB AG IA: CPT

## 2019-06-07 PROCEDURE — 84443 ASSAY THYROID STIM HORMONE: CPT | Mod: 91

## 2019-06-07 PROCEDURE — 80061 LIPID PANEL: CPT

## 2019-06-07 PROCEDURE — 99204 OFFICE O/P NEW MOD 45 MIN: CPT | Performed by: INTERNAL MEDICINE

## 2019-06-07 PROCEDURE — 80053 COMPREHEN METABOLIC PANEL: CPT

## 2019-06-07 NOTE — PATIENT INSTRUCTIONS
"    Good sleep hygiene:  --Keep a regular sleep schedule. Going to bed at same time and waking up at same time (even on weekends).   --Go to bed only when sleepy. Sleep only as much as you need to feel rested and then get out of bed  --Bed for sleep and sex only.  Do not read or watch TV in bed.  --Avoid TV, computer/tablet screens for 2 hours prior to bedtime or wear blue light blocking sunglasses. Avoid prolonged use of light-emitting screens before bedtime.  --Dark, cool, quiet bedroom. \"White noise\" (ie from a fan) may be helpful.  --No caffeinated beverages after lunch; do not use alcohol for sleep. Avoid alcohol near bedtime.  --Do not go to bed hungry  -- Do not take a nap during the day.  --Exercise regularly, preferably at least 4 to 5 hours before bedtime  --Make the bedroom environment conducive to sleep  --Can try Valerian root or melatonin, which are both over the counter.  -- Avoid smoking, especially in the evening  --Deal with your worries before bedtime  -- Get out of bed if unable to fall asleep within 20 minutes and go to another room. Return to bed only when sleepy. Repeat this step as many times as necessary throughout the night.  Stimulus control therapy rule    Relaxation -- Relaxation therapy involves progressively relaxing your muscles from your head down to your feet. Here is a sample of a relaxation program: Beginning with the muscles in your face, squeeze (contract) your muscles gently for one to two seconds and then relax. Repeat several times. Use the same technique for other muscle groups, usually in the following sequence: jaw and neck, shoulders, upper arms, lower arms, fingers, chest, abdomen, buttocks, thighs, calves, and feet. Repeat this cycle for 45 minutes, if necessary. This relaxation program can promote restfulness and sleep.      "

## 2019-06-07 NOTE — ASSESSMENT & PLAN NOTE
Chronic hx of iron deficiency anemia due to hemorrhoids. Every other month he would have exacerbation os symptoms. He was taking 1 tablet of iron supplement daily. pendding repeated CBC and iron studies.

## 2019-06-08 LAB — T4 FREE SERPL-MCNC: 0.61 NG/DL (ref 0.53–1.43)

## 2019-06-08 NOTE — ASSESSMENT & PLAN NOTE
Difficulty sleep recently. Sleep hygiene education provided  Recommend reduce energy drinks and ETOH.

## 2019-06-08 NOTE — ASSESSMENT & PLAN NOTE
Chronic hx of Tourette disorder, has been referred to psychiatry previously however, patient did not follow up. Referral info provided to patient again. Currently on clonidine 0.3 mg TID.

## 2019-06-08 NOTE — PROGRESS NOTES
New Patient to Establish    Reason to establish: PCP switch     Adis Chance is a 39 y.o. male who presents today with the following:    CC:   Chief Complaint   Patient presents with   • Establish Care     stomach issues       HPI:     GERD (gastroesophageal reflux disease)  Chronic reflex. On PPI. Following with GI. Occasional nausea and vomiting. Encourage oral hydration and avoid ETOH and food that induces heartburn.     Iron deficiency anemia due to chronic blood loss  Chronic hx of iron deficiency anemia due to hemorrhoids. Every other month he would have exacerbation os symptoms. He was taking 1 tablet of iron supplement daily. pendding repeated CBC and iron studies.     Tourette disorder  Chronic hx of Tourette disorder, has been referred to psychiatry previously however, patient did not follow up. Referral info provided to patient again. Currently on clonidine 0.3 mg TID.     OCD (obsessive compulsive disorder)  Referral info provided to patient for his OCD regarding his bowel habits.     Anemia  Hx of intermittent rectal bleeding from hemorrhoid. Following with GI.   Pending lab. Currently no overt bleeding.     Other insomnia  Difficulty sleep recently. Sleep hygiene education provided  Recommend reduce energy drinks and ETOH.       Current Outpatient Prescriptions:   •  rabeprazole (ACIPHEX) 20 MG tablet, Take 1 Tab by mouth 2 times a day., Disp: 60 Tab, Rfl: 11  •  cloNIDine (CATAPRESS) 0.3 MG Tab, Take 1 Tab by mouth 3 times a day., Disp: 270 Tab, Rfl: 1    Allergies, past medical history, past surgical history, medications, family history, social history reviewed and updated.    ROS     Constitutional: Denies fevers or chills  Eyes: Denies changes in vision  Ears/Nose/Throat/Mouth: Denies nasal congestion or sore throat   Cardiovascular: Denies chest pain or palpitations   Respiratory: Denies shortness of breath , Denies cough  Gastrointestinal/Hepatic: Denies abd pain, nausea, vomiting  "  Genitourinary: Denies dysuria or frequency  Musculoskeletal/Rheum: chronic neck pain  Neurological: Denies headache  Psychiatric: OCD, Tourette, depression, No SI/HI  Endocrine: Denies hx of diabetes or thyroid dysfunction  Heme/Oncology/Lymph Nodes: Denies weight changes or enlarged LNs.    Physical Exam  /78 (BP Location: Right arm, Patient Position: Sitting, BP Cuff Size: Adult)   Pulse 96   Temp 36.4 °C (97.5 °F) (Temporal)   Resp 16   Ht 1.778 m (5' 10\")   Wt 80.9 kg (178 lb 5.6 oz)   SpO2 98%   BMI 25.59 kg/m²   General: Normal appearance.  Well developed, well nourished, no acute distress.  HEENT: Normocephalic.  Extraocular motion intact. Pupils are equally round, reactive to light and accommodation, conjunctiva clear, no scleral icterus.  Ears: normal shape and contour, ear canals clear, tympanic membranes intact. Hearing intact.  Oropharynx clear, no erythema, edema or exudate noted. Dental caries.  NECK: Thyroid is not enlarged. No JVD.  No carotid bruits. No masses.  Cardiovascular: Regular rhythm and rate. No murmur/rubs/gallops.   Respiratory: Normal respiratory effort, clear to auscultation bilaterally. No wheezing/rales/rhonchi.    Abdomen: Bowel sounds present, soft, nontender, nondistended, no rebound, no guarding.   : No suprapubic tenderness. No CVA tenderness.   EXT: no LE edema b/l. No cyanosis.  No clubbing.  Lymph: No cervical, supraclavicular or axillary lymph nodes are palpable  Skin: Warm and dry.  No suspicious lesions or rashes.   Neurologic: No focal deficits.   Sensation intact.    Psych: AAOx3,  Normal mood and affect    Patient notes (5/31/19) were reviewed.   Assessment and Plan    Adis was seen today for establish care.    Diagnoses and all orders for this visit:    Gastroesophageal reflux disease, esophagitis presence not specified  Continue PPI  Avoid triggers    Iron deficiency anemia due to chronic blood loss  Pending CBC and Iron studies   PPI  Follow with " "GI  Monitor for any bleeding    Subclinical hypothyroidism  -     Comp Metabolic Panel; Future  -     TSH WITH REFLEX TO FT4; Future  -     Lipid Profile; Future    Obsessive-compulsive disorder, unspecified type  -     REFERRAL TO BEHAVIORAL HEALTH    Tourette disorder  -     REFERRAL TO BEHAVIORAL HEALTH    Depression, unspecified depression type  -     REFERRAL TO BEHAVIORAL HEALTH    Other insomnia    Good sleep hygiene:  --Keep a regular sleep schedule. Going to bed at same time and waking up at same time (even on weekends).   --Go to bed only when sleepy. Sleep only as much as you need to feel rested and then get out of bed  --Bed for sleep and sex only.  Do not read or watch TV in bed.  --Avoid TV, computer/tablet screens for 2 hours prior to bedtime or wear blue light blocking sunglasses. Avoid prolonged use of light-emitting screens before bedtime.  --Dark, cool, quiet bedroom. \"White noise\" (ie from a fan) may be helpful.  --No caffeinated beverages after lunch; do not use alcohol for sleep. Avoid alcohol near bedtime.  --Do not go to bed hungry  -- Do not take a nap during the day.  --Exercise regularly, preferably at least 4 to 5 hours before bedtime  --Make the bedroom environment conducive to sleep  --Can try Valerian root or melatonin, which are both over the counter.  -- Avoid smoking, especially in the evening  --Deal with your worries before bedtime  -- Get out of bed if unable to fall asleep within 20 minutes and go to another room. Return to bed only when sleepy. Repeat this step as many times as necessary throughout the night.  Stimulus control therapy rule    Relaxation -- Relaxation therapy involves progressively relaxing your muscles from your head down to your feet. Here is a sample of a relaxation program: Beginning with the muscles in your face, squeeze (contract) your muscles gently for one to two seconds and then relax. Repeat several times. Use the same technique for other muscle " groups, usually in the following sequence: jaw and neck, shoulders, upper arms, lower arms, fingers, chest, abdomen, buttocks, thighs, calves, and feet. Repeat this cycle for 45 minutes, if necessary. This relaxation program can promote restfulness and sleep.    Screening for HIV (human immunodeficiency virus)  -     HIV AG/AB COMBO ASSAY SCREENING; Future        Follow-up:Return if symptoms worsen or fail to improve.    This note was created using voice recognition software. There may be unintended errors in spelling, grammar or content.

## 2019-06-08 NOTE — ASSESSMENT & PLAN NOTE
Hx of intermittent rectal bleeding from hemorrhoid. Following with GI.   Pending lab. Currently no overt bleeding.

## 2019-06-10 ENCOUNTER — TELEPHONE (OUTPATIENT)
Dept: MEDICAL GROUP | Facility: PHYSICIAN GROUP | Age: 39
End: 2019-06-10

## 2019-06-10 ENCOUNTER — TELEPHONE (OUTPATIENT)
Dept: MEDICAL GROUP | Facility: MEDICAL CENTER | Age: 39
End: 2019-06-10

## 2019-06-10 NOTE — TELEPHONE ENCOUNTER
1. Caller Name: Adis Chance                                           Call Back Number: [phone] (home)         Patient approves a detailed voicemail message: yes    Left PT Voice Massage with Lab results: Still with anemia but Freddie doesnt see the iron level he ordered.  Take a second iron supplement daily.  Follow-up with your new PCP.     Yair Wiggins, Med Ass't

## 2019-06-10 NOTE — TELEPHONE ENCOUNTER
----- Message from Yevgeniy Quintana M.D. sent at 6/9/2019  2:58 PM PDT -----  Please call patient and schedule an appointment to discuss lab in one week.     Thanks!

## 2019-06-17 ENCOUNTER — HOSPITAL ENCOUNTER (OUTPATIENT)
Dept: LAB | Facility: MEDICAL CENTER | Age: 39
End: 2019-06-17
Attending: INTERNAL MEDICINE
Payer: COMMERCIAL

## 2019-06-17 ENCOUNTER — OFFICE VISIT (OUTPATIENT)
Dept: MEDICAL GROUP | Facility: PHYSICIAN GROUP | Age: 39
End: 2019-06-17
Payer: COMMERCIAL

## 2019-06-17 ENCOUNTER — HOSPITAL ENCOUNTER (OUTPATIENT)
Dept: LAB | Facility: MEDICAL CENTER | Age: 39
End: 2019-06-17
Attending: PHYSICIAN ASSISTANT
Payer: COMMERCIAL

## 2019-06-17 VITALS
RESPIRATION RATE: 16 BRPM | DIASTOLIC BLOOD PRESSURE: 84 MMHG | SYSTOLIC BLOOD PRESSURE: 112 MMHG | HEART RATE: 71 BPM | TEMPERATURE: 97.1 F | BODY MASS INDEX: 25.91 KG/M2 | HEIGHT: 70 IN | WEIGHT: 181 LBS | OXYGEN SATURATION: 99 %

## 2019-06-17 DIAGNOSIS — K59.09 OTHER CONSTIPATION: ICD-10-CM

## 2019-06-17 DIAGNOSIS — R74.01 TRANSAMINITIS: ICD-10-CM

## 2019-06-17 DIAGNOSIS — E03.8 SUBCLINICAL HYPOTHYROIDISM: ICD-10-CM

## 2019-06-17 DIAGNOSIS — K64.8 OTHER HEMORRHOIDS: ICD-10-CM

## 2019-06-17 DIAGNOSIS — D50.0 IRON DEFICIENCY ANEMIA DUE TO CHRONIC BLOOD LOSS: ICD-10-CM

## 2019-06-17 DIAGNOSIS — E78.49 OTHER HYPERLIPIDEMIA: ICD-10-CM

## 2019-06-17 LAB
IRON SATN MFR SERPL: 2 % (ref 15–55)
IRON SERPL-MCNC: 11 UG/DL (ref 50–180)
TIBC SERPL-MCNC: 452 UG/DL (ref 250–450)

## 2019-06-17 PROCEDURE — 86376 MICROSOMAL ANTIBODY EACH: CPT

## 2019-06-17 PROCEDURE — 36415 COLL VENOUS BLD VENIPUNCTURE: CPT

## 2019-06-17 PROCEDURE — 99214 OFFICE O/P EST MOD 30 MIN: CPT | Performed by: INTERNAL MEDICINE

## 2019-06-17 PROCEDURE — 83540 ASSAY OF IRON: CPT

## 2019-06-17 PROCEDURE — 83550 IRON BINDING TEST: CPT

## 2019-06-17 RX ORDER — FERROUS SULFATE 325(65) MG
325 TABLET ORAL 2 TIMES DAILY
Qty: 180 TAB | Refills: 0 | Status: SHIPPED | OUTPATIENT
Start: 2019-06-17 | End: 2019-09-30 | Stop reason: SDUPTHER

## 2019-06-18 DIAGNOSIS — E03.8 SUBCLINICAL HYPOTHYROIDISM: ICD-10-CM

## 2019-06-18 DIAGNOSIS — R76.8 ANTI-TPO ANTIBODIES PRESENT: ICD-10-CM

## 2019-06-18 LAB — THYROPEROXIDASE AB SERPL-ACNC: 228.2 IU/ML (ref 0–9)

## 2019-06-18 RX ORDER — LEVOTHYROXINE SODIUM 0.05 MG/1
50 TABLET ORAL
Qty: 90 TAB | Refills: 0 | Status: SHIPPED | OUTPATIENT
Start: 2019-06-18 | End: 2019-09-30 | Stop reason: SDUPTHER

## 2019-06-18 NOTE — ASSESSMENT & PLAN NOTE
Ref. Range 6/7/2019 15:05   TSH Latest Ref Range: 0.380 - 5.330 uIU/mL 6.680 (H)   Free T-4 Latest Ref Range: 0.53 - 1.43 ng/dL 0.62   Asymptomatic. will check TPO

## 2019-06-18 NOTE — PROGRESS NOTES
Established Patient    Adis Chance is a 39 y.o. male who presents today with the following:    CC:   Chief Complaint   Patient presents with   • Follow-Up anemia, transaminitis     Lab review        HPI:     Transaminitis     Ref. Range 6/7/2019 15:00   AST(SGOT) Latest Ref Range: 12 - 45 U/L 79 (H)   ALT(SGPT) Latest Ref Range: 2 - 50 U/L 38   Alkaline Phosphatase Latest Ref Range: 30 - 99 U/L 80   Total Bilirubin Latest Ref Range: 0.1 - 1.5 mg/dL 0.8   Albumin Latest Ref Range: 3.2 - 4.9 g/dL 4.2   Education counseling provided for alcohol cessation.      Iron deficiency anemia due to chronic blood loss     Ref. Range 6/7/2019 15:05   Hemoglobin Latest Ref Range: 14.0 - 18.0 g/dL 11.5 (L)   Hematocrit Latest Ref Range: 42.0 - 52.0 % 38.7 (L)   MCV Latest Ref Range: 81.4 - 97.8 fL 86.6      Ref. Range 6/7/2019 15:05   Ferritin Latest Ref Range: 22.0 - 322.0 ng/mL 10.7 (L)     Chronic hx of iron deficiency anemia due to hemorrhoids. Every other month he would have exacerbation of symptoms. He was taking 1 tablet of iron supplement daily. Will increase to 2 tablets of iron supplement daily.  Recommend patient not to overstrain when having BM.  Recommend patient follow up with GI Dr. Starks  Stop alcohol use.   Recommend patient also make an appointment with surgery (given the internal hemorrhoid is not suitable for banding per GI)  Seek medical attention immediately for excess bleeding.         Subclinical hypothyroidism     Ref. Range 6/7/2019 15:05   TSH Latest Ref Range: 0.380 - 5.330 uIU/mL 6.680 (H)   Free T-4 Latest Ref Range: 0.53 - 1.43 ng/dL 0.62   Asymptomatic. will check TPO          Current Outpatient Prescriptions   Medication Sig Dispense Refill   • ferrous sulfate 325 (65 Fe) MG tablet Take 1 Tab by mouth 2 Times a Day for 90 days. 180 Tab 0   • rabeprazole (ACIPHEX) 20 MG tablet Take 1 Tab by mouth 2 times a day. 60 Tab 11   • cloNIDine (CATAPRESS) 0.3 MG Tab Take 1 Tab by mouth 3 times a day. 270  "Tab 1     No current facility-administered medications for this visit.        Allergies, past medical history, past surgical history, medications, family history, social history reviewed and updated.    ROS   Constitutional: Denies fevers or chills  Eyes: Denies changes in vision  Ears/Nose/Throat/Mouth: Denies nasal congestion or sore throat   Cardiovascular: Denies chest pain or palpitations   Respiratory: Denies shortness of breath , Denies cough  Gastrointestinal/Hepatic: Denies abd pain, nausea, vomiting   Genitourinary: Denies dysuria or frequency  Musculoskeletal/Rheum: chronic neck pain  Neurological: Denies headache  Psychiatric: OCD, tourette, alcohol use   Endocrine: Denies hx of diabetes or thyroid dysfunction  Heme/Oncology/Lymph Nodes: Denies weight changes or enlarged LNs.    Physical Exam  Vitals: /84 (BP Location: Left arm, Patient Position: Sitting, BP Cuff Size: Adult)   Pulse 71   Temp 36.2 °C (97.1 °F) (Temporal)   Resp 16   Ht 1.778 m (5' 10\")   Wt 82.1 kg (181 lb)   SpO2 99%   BMI 25.97 kg/m²   General: Alert, pleasant, NAD  HEENT: Normocephalic.  EOMI, no icterus or pallor.  Conjunctivae and lids normal. External ears normal. Oropharynx non-erythematous, mucous membranes moist.  Neck supple.  No thyromegaly or masses palpated.   Lymph: No cervical or supraclavicular lymphadenopathy.  Cardiovascular: Regular rate and rhythm.   Respiratory: Normal respiratory effort.  Clear to auscultation bilaterally.  Abdomen: Non-distended, soft, non-tender  Skin: Warm, dry, no suspicious rashes.  Musculoskeletal: Gait is normal.  Moves all extremities well.  Extremities: No leg edema.   Psych: Affect/mood is normal, judgement is fair, memory is fair, grooming is appropriate.      Labs (6/7/19) were reviewed and discussed with patients.  All questions were answered.      Assessment and Plan    Adis was seen today for follow-up.    Diagnoses and all orders for this visit:    Iron deficiency " anemia due to chronic blood loss    Chronic hx of iron deficiency anemia due to hemorrhoids. Every other month he would have exacerbation of symptoms. He was taking 1 tablet of iron supplement daily. Will increase to 2 tablets of iron supplement daily.  Recommend patient not to overstrain when having BM.  Recommend patient follow up with GI Dr. Starks  Stop alcohol use.   Recommend patient also make an appointment with surgery (given the internal hemorrhoid is not suitable for banding per GI)  Monitor for any bleeding. Seek medical attention immediately for excess bleeding.   -     OCCULT BLOOD FECES IMMUNOASSAY; Future  -     ferrous sulfate 325 (65 Fe) MG tablet; Take 1 Tab by mouth 2 Times a Day for 90 days.  Will recheck CBC in 3 months or prn    Subclinical hypothyroidism  stable  -     THYROID PEROXIDASE  (TPO) AB; Future  - monitor    Other hemorrhoids  Follow with GI and surgery    Other hyperlipidemia   Ref. Range 6/7/2019 15:00   Cholesterol,Tot Latest Ref Range: 100 - 199 mg/dL 200 (H)   Triglycerides Latest Ref Range: 0 - 149 mg/dL 49   HDL Latest Ref Range: >=40 mg/dL 98   LDL Latest Ref Range: <100 mg/dL 92     Healthful diet and exercise    Other constipation  High fiber diet, OTC miralax prn    Transaminitis  ETOH cessation  Monitor in 3 months      Follow-up:Return in about 3 months (around 9/17/2019), or if symptoms worsen or fail to improve.    This note was created using voice recognition software. There may be unintended errors in spelling, grammar or content.

## 2019-06-18 NOTE — PROGRESS NOTES
Subclinical hypothyroidism      Ref. Range 6/7/2019 15:05   TSH Latest Ref Range: 0.380 - 5.330 uIU/mL 6.680 (H)   Free T-4 Latest Ref Range: 0.53 - 1.43 ng/dL 0.62     Microsomal -Tpo- Abs 228.2     Will initiate synthroid 50 daily, sent to pharmacy    Called and left voice message

## 2019-06-18 NOTE — ASSESSMENT & PLAN NOTE
Ref. Range 6/7/2019 15:05   Hemoglobin Latest Ref Range: 14.0 - 18.0 g/dL 11.5 (L)   Hematocrit Latest Ref Range: 42.0 - 52.0 % 38.7 (L)   MCV Latest Ref Range: 81.4 - 97.8 fL 86.6      Ref. Range 6/7/2019 15:05   Ferritin Latest Ref Range: 22.0 - 322.0 ng/mL 10.7 (L)     Chronic hx of iron deficiency anemia due to hemorrhoids. Every other month he would have exacerbation of symptoms. He was taking 1 tablet of iron supplement daily. Will increase to 2 tablets of iron supplement daily.  Recommend patient not to overstrain when having BM.  Recommend patient follow up with GI Dr. Starks  Stop alcohol use.   Recommend patient also make an appointment with surgery (given the internal hemorrhoid is not suitable for banding per GI)  Seek medical attention immediately for excess bleeding.

## 2019-08-02 ENCOUNTER — TELEPHONE (OUTPATIENT)
Dept: MEDICAL GROUP | Facility: PHYSICIAN GROUP | Age: 39
End: 2019-08-02

## 2019-08-02 DIAGNOSIS — F95.2 TOURETTE DISORDER: ICD-10-CM

## 2019-08-02 NOTE — TELEPHONE ENCOUNTER
Patient called needs a referral to see [facility].  Can you please put one in for him or does he need to see you?  Thanks    Order placed

## 2019-08-30 ENCOUNTER — APPOINTMENT (OUTPATIENT)
Dept: MEDICAL GROUP | Facility: MEDICAL CENTER | Age: 39
End: 2019-08-30
Payer: COMMERCIAL

## 2019-09-30 DIAGNOSIS — D50.0 IRON DEFICIENCY ANEMIA DUE TO CHRONIC BLOOD LOSS: ICD-10-CM

## 2019-09-30 DIAGNOSIS — R76.8 ANTI-TPO ANTIBODIES PRESENT: ICD-10-CM

## 2019-09-30 DIAGNOSIS — E03.8 SUBCLINICAL HYPOTHYROIDISM: ICD-10-CM

## 2019-09-30 RX ORDER — FERROUS SULFATE 325(65) MG
325 TABLET ORAL 2 TIMES DAILY
Qty: 60 TAB | Refills: 2 | Status: SHIPPED | OUTPATIENT
Start: 2019-09-30 | End: 2020-01-06

## 2019-09-30 RX ORDER — LEVOTHYROXINE SODIUM 0.05 MG/1
TABLET ORAL
Qty: 30 TAB | Refills: 2 | Status: SHIPPED | OUTPATIENT
Start: 2019-09-30 | End: 2020-02-11

## 2019-10-17 PROBLEM — E03.9 ACQUIRED HYPOTHYROIDISM: Status: ACTIVE | Noted: 2017-06-15

## 2019-10-21 ENCOUNTER — TELEPHONE (OUTPATIENT)
Dept: MEDICAL GROUP | Facility: MEDICAL CENTER | Age: 39
End: 2019-10-21

## 2019-10-21 NOTE — TELEPHONE ENCOUNTER
----- Message from Freddie Arrieta P.A.-C. sent at 10/17/2019  3:35 PM PDT -----  Please contact patient.  Have him follow-up with his PCP to have his thyroid medication adjusted.  Thank you.    Freddie

## 2019-10-21 NOTE — TELEPHONE ENCOUNTER
Phone Number Called: [phone] (home)      Call outcome: left message for patient to call back regarding message below    Message: Left message for patient about the following message below.

## 2019-11-10 ENCOUNTER — HOSPITAL ENCOUNTER (OUTPATIENT)
Facility: MEDICAL CENTER | Age: 39
End: 2019-11-10
Attending: INTERNAL MEDICINE
Payer: COMMERCIAL

## 2019-11-10 PROCEDURE — 82274 ASSAY TEST FOR BLOOD FECAL: CPT

## 2019-11-11 ENCOUNTER — HOSPITAL ENCOUNTER (OUTPATIENT)
Dept: LAB | Facility: MEDICAL CENTER | Age: 39
End: 2019-11-11
Attending: INTERNAL MEDICINE
Payer: COMMERCIAL

## 2019-11-11 ENCOUNTER — OFFICE VISIT (OUTPATIENT)
Dept: MEDICAL GROUP | Facility: PHYSICIAN GROUP | Age: 39
End: 2019-11-11
Payer: COMMERCIAL

## 2019-11-11 VITALS
OXYGEN SATURATION: 98 % | SYSTOLIC BLOOD PRESSURE: 112 MMHG | BODY MASS INDEX: 25.05 KG/M2 | DIASTOLIC BLOOD PRESSURE: 78 MMHG | HEART RATE: 78 BPM | WEIGHT: 175 LBS | RESPIRATION RATE: 18 BRPM | HEIGHT: 70 IN | TEMPERATURE: 98.4 F

## 2019-11-11 DIAGNOSIS — D50.0 IRON DEFICIENCY ANEMIA DUE TO CHRONIC BLOOD LOSS: ICD-10-CM

## 2019-11-11 DIAGNOSIS — E03.9 ACQUIRED HYPOTHYROIDISM: ICD-10-CM

## 2019-11-11 DIAGNOSIS — F95.2 TOURETTE DISORDER: ICD-10-CM

## 2019-11-11 DIAGNOSIS — F42.9 OBSESSIVE-COMPULSIVE DISORDER, UNSPECIFIED TYPE: ICD-10-CM

## 2019-11-11 DIAGNOSIS — K64.9 HEMORRHOIDS, UNSPECIFIED HEMORRHOID TYPE: ICD-10-CM

## 2019-11-11 DIAGNOSIS — K62.5 RECTAL BLEEDING: ICD-10-CM

## 2019-11-11 DIAGNOSIS — Z23 NEED FOR VACCINATION: ICD-10-CM

## 2019-11-11 DIAGNOSIS — R74.01 TRANSAMINITIS: ICD-10-CM

## 2019-11-11 LAB
ALBUMIN SERPL BCP-MCNC: 4.2 G/DL (ref 3.2–4.9)
ALBUMIN/GLOB SERPL: 1.4 G/DL
ALP SERPL-CCNC: 102 U/L (ref 30–99)
ALT SERPL-CCNC: 119 U/L (ref 2–50)
ANION GAP SERPL CALC-SCNC: 12 MMOL/L (ref 0–11.9)
AST SERPL-CCNC: 263 U/L (ref 12–45)
BASOPHILS # BLD AUTO: 1.8 % (ref 0–1.8)
BASOPHILS # BLD: 0.08 K/UL (ref 0–0.12)
BILIRUB SERPL-MCNC: 1.1 MG/DL (ref 0.1–1.5)
BUN SERPL-MCNC: 6 MG/DL (ref 8–22)
CALCIUM SERPL-MCNC: 9.3 MG/DL (ref 8.5–10.5)
CHLORIDE SERPL-SCNC: 104 MMOL/L (ref 96–112)
CO2 SERPL-SCNC: 25 MMOL/L (ref 20–33)
CREAT SERPL-MCNC: 0.94 MG/DL (ref 0.5–1.4)
EOSINOPHIL # BLD AUTO: 0.27 K/UL (ref 0–0.51)
EOSINOPHIL NFR BLD: 5.9 % (ref 0–6.9)
ERYTHROCYTE [DISTWIDTH] IN BLOOD BY AUTOMATED COUNT: 53.7 FL (ref 35.9–50)
FERRITIN SERPL-MCNC: 51.4 NG/ML (ref 22–322)
GLOBULIN SER CALC-MCNC: 2.9 G/DL (ref 1.9–3.5)
GLUCOSE SERPL-MCNC: 105 MG/DL (ref 65–99)
HCT VFR BLD AUTO: 41.9 % (ref 42–52)
HGB BLD-MCNC: 14 G/DL (ref 14–18)
IMM GRANULOCYTES # BLD AUTO: 0.01 K/UL (ref 0–0.11)
IMM GRANULOCYTES NFR BLD AUTO: 0.2 % (ref 0–0.9)
IRON SATN MFR SERPL: 9 % (ref 15–55)
IRON SERPL-MCNC: 30 UG/DL (ref 50–180)
LYMPHOCYTES # BLD AUTO: 1.57 K/UL (ref 1–4.8)
LYMPHOCYTES NFR BLD: 34.4 % (ref 22–41)
MCH RBC QN AUTO: 34.7 PG (ref 27–33)
MCHC RBC AUTO-ENTMCNC: 33.4 G/DL (ref 33.7–35.3)
MCV RBC AUTO: 103.7 FL (ref 81.4–97.8)
MONOCYTES # BLD AUTO: 0.47 K/UL (ref 0–0.85)
MONOCYTES NFR BLD AUTO: 10.3 % (ref 0–13.4)
NEUTROPHILS # BLD AUTO: 2.16 K/UL (ref 1.82–7.42)
NEUTROPHILS NFR BLD: 47.4 % (ref 44–72)
NRBC # BLD AUTO: 0 K/UL
NRBC BLD-RTO: 0 /100 WBC
PLATELET # BLD AUTO: 282 K/UL (ref 164–446)
PMV BLD AUTO: 9.5 FL (ref 9–12.9)
POTASSIUM SERPL-SCNC: 3.6 MMOL/L (ref 3.6–5.5)
PROT SERPL-MCNC: 7.1 G/DL (ref 6–8.2)
RBC # BLD AUTO: 4.04 M/UL (ref 4.7–6.1)
SODIUM SERPL-SCNC: 141 MMOL/L (ref 135–145)
TIBC SERPL-MCNC: 336 UG/DL (ref 250–450)
WBC # BLD AUTO: 4.6 K/UL (ref 4.8–10.8)

## 2019-11-11 PROCEDURE — 82728 ASSAY OF FERRITIN: CPT

## 2019-11-11 PROCEDURE — 83540 ASSAY OF IRON: CPT

## 2019-11-11 PROCEDURE — 90686 IIV4 VACC NO PRSV 0.5 ML IM: CPT | Performed by: INTERNAL MEDICINE

## 2019-11-11 PROCEDURE — 36415 COLL VENOUS BLD VENIPUNCTURE: CPT

## 2019-11-11 PROCEDURE — 83550 IRON BINDING TEST: CPT

## 2019-11-11 PROCEDURE — 80053 COMPREHEN METABOLIC PANEL: CPT

## 2019-11-11 PROCEDURE — 99214 OFFICE O/P EST MOD 30 MIN: CPT | Mod: 25 | Performed by: INTERNAL MEDICINE

## 2019-11-11 PROCEDURE — 90471 IMMUNIZATION ADMIN: CPT | Performed by: INTERNAL MEDICINE

## 2019-11-11 PROCEDURE — 85025 COMPLETE CBC W/AUTO DIFF WBC: CPT

## 2019-11-11 NOTE — ASSESSMENT & PLAN NOTE
Ref. Range 6/7/2019 15:00   AST(SGOT) Latest Ref Range: 12 - 45 U/L 79 (H)   ALT(SGPT) Latest Ref Range: 2 - 50 U/L 38   Alkaline Phosphatase Latest Ref Range: 30 - 99 U/L 80   Total Bilirubin Latest Ref Range: 0.1 - 1.5 mg/dL 0.8   Albumin Latest Ref Range: 3.2 - 4.9 g/dL 4.2   Education counseling provided for alcohol cessation.

## 2019-11-11 NOTE — ASSESSMENT & PLAN NOTE
Chronic hx of Tourette disorder, has been referred to psychiatry previously however, patient did not follow up. Referral placed again.  Currently on clonidine 0.3 mg TID.

## 2019-11-11 NOTE — ASSESSMENT & PLAN NOTE
Fatigue.  Patient is not compliant with synthroid, he states he only take 50% of the time. Compliance counseling provided. Check TSH in six weeks  10/16/2019 TSH 10.82H, FT 0.52   Ref. Range 6/17/2019 16:03   Microsomal -Tpo- Abs Latest Ref Range: 0.0 - 9.0 IU/mL 228.2 (H)

## 2019-11-11 NOTE — ASSESSMENT & PLAN NOTE
Hx of intermittent rectal bleeding from hemorrhoid. Following with GI.    no rectal bleeding today.    Ref. Range 6/7/2019 15:05   Hemoglobin Latest Ref Range: 14.0 - 18.0 g/dL 11.5 (L)   Hematocrit Latest Ref Range: 42.0 - 52.0 % 38.7 (L)   MCV Latest Ref Range: 81.4 - 97.8 fL 86.6      Ref. Range 6/7/2019 15:05   Ferritin Latest Ref Range: 22.0 - 322.0 ng/mL 10.7 (L)     4/11/2018 EGD normal esophagus.  Normal mucosa in the second part of duodenum in duodenal bulb.  Erythema in the stomach antrum and stomach body.  Colonoscopy: External hemorrhoids.  Grade 4 internal hemorrhoids.  Normal mucosa in the whole colon.  Patient was referred to [facility] surgical associate for the hemorrhoids.  By Dr. Starks GI consultant     Ref. Range 6/7/2019 15:05   Ferritin Latest Ref Range: 22.0 - 322.0 ng/mL 10.7 (L)     Chronic hx of iron deficiency anemia due to hemorrhoids. Every other month he would have exacerbation of symptoms. He was taking 2 tablet of iron supplement daily.   Recommend patient not to overstrain when having BM. However, his OCD is making him hard to stop keep pushing even after all of BM has been passed. Referral to behavioral medicine placed again to treat OCD  Recommend patient follow up with GI Dr. Starks  Stop alcohol use.   Recommend patient also make an appointment with surgery as patient has severe internal and external hemorrhoid. Referral placed today.   Seek medical attention immediately for rectal bleeding.

## 2019-11-11 NOTE — ASSESSMENT & PLAN NOTE
Hx of intermittent rectal bleeding from hemorrhoid.   Currently no overt bleeding.     Ref. Range 6/7/2019 15:05   Hemoglobin Latest Ref Range: 14.0 - 18.0 g/dL 11.5 (L)   Hematocrit Latest Ref Range: 42.0 - 52.0 % 38.7 (L)   MCV Latest Ref Range: 81.4 - 97.8 fL 86.6      Ref. Range 6/7/2019 15:05   Ferritin Latest Ref Range: 22.0 - 322.0 ng/mL 10.7 (L)     4/11/2018 EGD normal esophagus.  Normal mucosa in the second part of duodenum in duodenal bulb.  Erythema in the stomach antrum and stomach body.  Colonoscopy: External hemorrhoids.  Grade 4 internal hemorrhoids.  Normal mucosa in the whole colon.  Patient was referred to [facility] surgical associate for the hemorrhoids.  By Dr. Starks GI consultant. Patient wants to see surgery in [place]. Referral placed today.

## 2019-11-11 NOTE — PROGRESS NOTES
Established Patient    Adsi Chance is a 39 y.o. male who presents today with the following:    CC:   Chief Complaint   Patient presents with   • Follow-Up       HPI:     Acquired hypothyroidism  Fatigue.  Patient is not compliant with synthroid, he states he only take 50% of the time. Compliance counseling provided. Check TSH in six weeks  10/16/2019 TSH 10.82H, FT 0.52   Ref. Range 6/17/2019 16:03   Microsomal -Tpo- Abs Latest Ref Range: 0.0 - 9.0 IU/mL 228.2 (H)         Iron deficiency anemia due to chronic blood loss  Hx of intermittent rectal bleeding from hemorrhoid. Following with GI.    no rectal bleeding today.    Ref. Range 6/7/2019 15:05   Hemoglobin Latest Ref Range: 14.0 - 18.0 g/dL 11.5 (L)   Hematocrit Latest Ref Range: 42.0 - 52.0 % 38.7 (L)   MCV Latest Ref Range: 81.4 - 97.8 fL 86.6      Ref. Range 6/7/2019 15:05   Ferritin Latest Ref Range: 22.0 - 322.0 ng/mL 10.7 (L)     4/11/2018 EGD normal esophagus.  Normal mucosa in the second part of duodenum in duodenal bulb.  Erythema in the stomach antrum and stomach body.  Colonoscopy: External hemorrhoids.  Grade 4 internal hemorrhoids.  Normal mucosa in the whole colon.  Patient was referred to [facility] surgical associate for the hemorrhoids.  By Dr. Starks GI consultant     Ref. Range 6/7/2019 15:05   Ferritin Latest Ref Range: 22.0 - 322.0 ng/mL 10.7 (L)     Chronic hx of iron deficiency anemia due to hemorrhoids. Every other month he would have exacerbation of symptoms. He was taking 2 tablet of iron supplement daily.   Recommend patient not to overstrain when having BM. However, his OCD is making him hard to stop keep pushing even after all of BM has been passed. Referral to behavioral medicine placed again to treat OCD  Recommend patient follow up with GI Dr. Starks  Stop alcohol use.   Recommend patient also make an appointment with surgery as patient has severe internal and external hemorrhoid. Referral placed today.   Seek medical  attention immediately for rectal bleeding.         Hemorrhoids  Hx of intermittent rectal bleeding from hemorrhoid.   Currently no overt bleeding.     Ref. Range 6/7/2019 15:05   Hemoglobin Latest Ref Range: 14.0 - 18.0 g/dL 11.5 (L)   Hematocrit Latest Ref Range: 42.0 - 52.0 % 38.7 (L)   MCV Latest Ref Range: 81.4 - 97.8 fL 86.6      Ref. Range 6/7/2019 15:05   Ferritin Latest Ref Range: 22.0 - 322.0 ng/mL 10.7 (L)     4/11/2018 EGD normal esophagus.  Normal mucosa in the second part of duodenum in duodenal bulb.  Erythema in the stomach antrum and stomach body.  Colonoscopy: External hemorrhoids.  Grade 4 internal hemorrhoids.  Normal mucosa in the whole colon.  Patient was referred to [facility] surgical associate for the hemorrhoids.  By Dr. Starks GI consultant. Patient wants to see surgery in [place]. Referral placed today.         Rectal bleeding  Intermittent rectal bleeding from severe internal and external hemorrhoid. Follow with GI. Per GI, patient needs to see surgery. Referral to surgery placed today  Seek medical attention immediately for rectal bleeding    Tourette disorder  Chronic hx of Tourette disorder, has been referred to psychiatry previously however, patient did not follow up. Referral placed again.  Currently on clonidine 0.3 mg TID.       Transaminitis     Ref. Range 6/7/2019 15:00   AST(SGOT) Latest Ref Range: 12 - 45 U/L 79 (H)   ALT(SGPT) Latest Ref Range: 2 - 50 U/L 38   Alkaline Phosphatase Latest Ref Range: 30 - 99 U/L 80   Total Bilirubin Latest Ref Range: 0.1 - 1.5 mg/dL 0.8   Albumin Latest Ref Range: 3.2 - 4.9 g/dL 4.2   Education counseling provided for alcohol cessation.        Current Outpatient Medications   Medication Sig Dispense Refill   • levothyroxine (SYNTHROID) 50 MCG Tab TAKE 1 TABLET BY MOUTH EVERY MORNING ON AN EMPTY STOMACH 30 Tab 2   • ferrous sulfate 325 (65 Fe) MG tablet TAKE 1 TAB BY MOUTH 2 TIMES A DAY FOR 90 DAYS. 60 Tab 2   • rabeprazole (ACIPHEX) 20 MG  "tablet Take 1 Tab by mouth 2 times a day. 60 Tab 11   • cloNIDine (CATAPRESS) 0.3 MG Tab Take 1 Tab by mouth 3 times a day. 270 Tab 1     No current facility-administered medications for this visit.        Allergies, past medical history, past surgical history, medications, family history, social history reviewed and updated.    ROS   Constitutional: Denies fevers or chills  Eyes: Denies changes in vision  Ears/Nose/Throat/Mouth: Denies nasal congestion or sore throat   Cardiovascular: Denies chest pain or palpitations   Respiratory: Denies shortness of breath , Denies cough  Gastrointestinal/Hepatic: intermittent rectal bleeding Denies abd pain, nausea, vomiting   Genitourinary: Denies dysuria or frequency  Musculoskeletal/Rheum: Denies joint pain and swelling   Neurological: Denies headache  Psychiatric: hx of OCD, mood stable  Endocrine: hypothyroidism Denies hx of diabetes  Heme/Oncology/Lymph Nodes: Denies weight changes or enlarged LNs.    Physical Exam  Vitals: /78 (BP Location: Right arm, Patient Position: Sitting, BP Cuff Size: Adult)   Pulse 78   Temp 36.9 °C (98.4 °F) (Temporal)   Resp 18   Ht 1.778 m (5' 10\")   Wt 79.4 kg (175 lb)   SpO2 98%   BMI 25.11 kg/m²   General: Alert, pleasant, NAD  HEENT: Normocephalic.  EOMI, no icterus or pallor.  Conjunctivae and lids normal. External ears normal. Oropharynx non-erythematous, mucous membranes moist.  Neck supple.  No thyromegaly or masses palpated.   Lymph: No cervical or supraclavicular lymphadenopathy.  Cardiovascular: Regular rate and rhythm.  S1 and S2 normal.  No murmurs appreciated.  Respiratory: Normal respiratory effort.  Clear to auscultation bilaterally.  Abdomen: Non-distended, soft  Skin: Warm, dry, no rashes.  Musculoskeletal: Gait is normal.  Moves all extremities well.  Extremities: No leg edema.  Radial  pulses 2+ symmetric.   Psych:  Affect/mood is normal, judgement is good, memory is intact, grooming is appropriate.  Rectal " exam: external and internal hemorrhoid appreciated. Otherwise unremarkable.     Labs (10/16/19) were reviewed and discussed with patients.  All questions were answered.      Assessment and Plan    Adis was seen today for follow-up.    Diagnoses and all orders for this visit:    Hemorrhoids, unspecified hemorrhoid type  Rectal bleeding  4/11/2018 EGD normal esophagus.  Normal mucosa in the second part of duodenum in duodenal bulb.  Erythema in the stomach antrum and stomach body.  Colonoscopy: External hemorrhoids.  Grade 4 internal hemorrhoids.  Normal mucosa in the whole colon.  Patient was referred to [facility] surgical associate for the hemorrhoids.  By Dr. Starks GI consultant. Patient wants to see surgery in [place]. Referral placed today.   -     CBC WITH DIFFERENTIAL; Future  -     Comp Metabolic Panel; Future  -     REFERRAL TO GENERAL SURGERY  Recommend patient not to overstrain when having BM. However, his OCD is making him hard to stop keep pushing even after all of BM has been passed. Referral to behavioral medicine placed again to treat OCD  Recommend patient follow up with GI Dr. Starks  Stop alcohol use.   Recommend patient also make an appointment with surgery as patient has severe internal and external hemorrhoid. Referral placed today.   Seek medical attention immediately for rectal bleeding.         Iron deficiency anemia due to chronic blood loss        -     CBC WITH DIFFERENTIAL; Future  -     FERRITIN; Future  -     IRON/TOTAL IRON BIND; Future  Follow with GI    Acquired hypothyroidism  -     TSH WITH REFLEX TO FT4; Future in six weeks  Compliance of synthroid discussed. Once he is compliant. Titrate synthroid base on TSH    Transaminitis  Monitor CMP  ETOH cessation  -     Comp Metabolic Panel; Future    Obsessive-compulsive disorder, unspecified type  -     REFERRAL TO BEHAVIORAL HEALTH    Tourette disorder  -     REFERRAL TO BEHAVIORAL HEALTH    Need for vaccination  -      INFLUENZA VACCINE      Follow-up:Return in about 8 weeks (around 1/6/2020), or if symptoms worsen or fail to improve.    This note was created using voice recognition software. There may be unintended errors in spelling, grammar or content.

## 2019-11-11 NOTE — ASSESSMENT & PLAN NOTE
Intermittent rectal bleeding from severe internal and external hemorrhoid. Follow with GI. Per GI, patient needs to see surgery. Referral to surgery placed today  Seek medical attention immediately for rectal bleeding

## 2019-11-12 DIAGNOSIS — R74.01 TRANSAMINITIS: ICD-10-CM

## 2019-11-12 DIAGNOSIS — D50.0 IRON DEFICIENCY ANEMIA DUE TO CHRONIC BLOOD LOSS: ICD-10-CM

## 2019-11-12 DIAGNOSIS — K59.09 OTHER CONSTIPATION: ICD-10-CM

## 2019-11-12 LAB — HEMOCCULT STL QL IA: NEGATIVE

## 2019-11-12 NOTE — PROGRESS NOTES
Per home health nurse, patient has constipation despite miralax use. Milk of Mg too strong makes him have diarrhea. Docusate is capsule which could not go through G tube. Trial of senna syrup.

## 2019-11-12 NOTE — PROGRESS NOTES
Ref. Range 11/11/2019 10:27   WBC Latest Ref Range: 4.8 - 10.8 K/uL 4.6 (L)   RBC Latest Ref Range: 4.70 - 6.10 M/uL 4.04 (L)   Hemoglobin Latest Ref Range: 14.0 - 18.0 g/dL 14.0   Hematocrit Latest Ref Range: 42.0 - 52.0 % 41.9 (L)   MCV Latest Ref Range: 81.4 - 97.8 fL 103.7 (H)   MCH Latest Ref Range: 27.0 - 33.0 pg 34.7 (H)   MCHC Latest Ref Range: 33.7 - 35.3 g/dL 33.4 (L)   RDW Latest Ref Range: 35.9 - 50.0 fL 53.7 (H)   Platelet Count Latest Ref Range: 164 - 446 K/uL 282   MPV Latest Ref Range: 9.0 - 12.9 fL 9.5   Neutrophils-Polys Latest Ref Range: 44.00 - 72.00 % 47.40   Neutrophils (Absolute) Latest Ref Range: 1.82 - 7.42 K/uL 2.16   Lymphocytes Latest Ref Range: 22.00 - 41.00 % 34.40   Lymphs (Absolute) Latest Ref Range: 1.00 - 4.80 K/uL 1.57   Monocytes Latest Ref Range: 0.00 - 13.40 % 10.30   Monos (Absolute) Latest Ref Range: 0.00 - 0.85 K/uL 0.47   Eosinophils Latest Ref Range: 0.00 - 6.90 % 5.90   Eos (Absolute) Latest Ref Range: 0.00 - 0.51 K/uL 0.27   Basophils Latest Ref Range: 0.00 - 1.80 % 1.80   Baso (Absolute) Latest Ref Range: 0.00 - 0.12 K/uL 0.08   Immature Granulocytes Latest Ref Range: 0.00 - 0.90 % 0.20   Immature Granulocytes (abs) Latest Ref Range: 0.00 - 0.11 K/uL 0.01   Nucleated RBC Latest Units: /100 WBC 0.00   NRBC (Absolute) Latest Units: K/uL 0.00     Results for DANNY JARRELL (MRN [id number]) as of 11/12/2019 08:26   Ref. Range 11/11/2019 10:27   Sodium Latest Ref Range: 135 - 145 mmol/L 141   Potassium Latest Ref Range: 3.6 - 5.5 mmol/L 3.6   Chloride Latest Ref Range: 96 - 112 mmol/L 104   Co2 Latest Ref Range: 20 - 33 mmol/L 25   Anion Gap Latest Ref Range: 0.0 - 11.9  12.0 (H)   Glucose Latest Ref Range: 65 - 99 mg/dL 105 (H)   Bun Latest Ref Range: 8 - 22 mg/dL 6 (L)   Creatinine Latest Ref Range: 0.50 - 1.40 mg/dL 0.94   GFR If  Latest Ref Range: >60 mL/min/1.73 m 2 >60   GFR If Non  Latest Ref Range: >60 mL/min/1.73 m 2 >60   Calcium  Latest Ref Range: 8.5 - 10.5 mg/dL 9.3   AST(SGOT) Latest Ref Range: 12 - 45 U/L 263 (H)   ALT(SGPT) Latest Ref Range: 2 - 50 U/L 119 (H)   Alkaline Phosphatase Latest Ref Range: 30 - 99 U/L 102 (H)   Total Bilirubin Latest Ref Range: 0.1 - 1.5 mg/dL 1.1   Albumin Latest Ref Range: 3.2 - 4.9 g/dL 4.2   Total Protein Latest Ref Range: 6.0 - 8.2 g/dL 7.1   Globulin Latest Ref Range: 1.9 - 3.5 g/dL 2.9   A-G Ratio Latest Units: g/dL 1.4   Iron Latest Ref Range: 50 - 180 ug/dL 30 (L)   Total Iron Binding Latest Ref Range: 250 - 450 ug/dL 336   % Saturation Latest Ref Range: 15 - 55 % 9 (L)     Called and left voice message to make an appointment to discuss lab, check hep panel and liver US.

## 2019-11-15 ENCOUNTER — HOSPITAL ENCOUNTER (OUTPATIENT)
Dept: LAB | Facility: MEDICAL CENTER | Age: 39
End: 2019-11-15
Attending: INTERNAL MEDICINE
Payer: COMMERCIAL

## 2019-11-15 DIAGNOSIS — R74.01 TRANSAMINITIS: ICD-10-CM

## 2019-11-15 LAB
HBV CORE IGM SER QL: NEGATIVE
HBV SURFACE AB SERPL IA-ACNC: 3.28 MIU/ML (ref 0–10)
HBV SURFACE AG SER QL: NEGATIVE

## 2019-11-15 PROCEDURE — 86706 HEP B SURFACE ANTIBODY: CPT

## 2019-11-15 PROCEDURE — 87350 HEPATITIS BE AG IA: CPT

## 2019-11-15 PROCEDURE — 86705 HEP B CORE ANTIBODY IGM: CPT

## 2019-11-15 PROCEDURE — 87340 HEPATITIS B SURFACE AG IA: CPT

## 2019-11-15 PROCEDURE — 86708 HEPATITIS A ANTIBODY: CPT

## 2019-11-15 PROCEDURE — 86707 HEPATITIS BE ANTIBODY: CPT

## 2019-11-15 PROCEDURE — 86709 HEPATITIS A IGM ANTIBODY: CPT

## 2019-11-15 PROCEDURE — 36415 COLL VENOUS BLD VENIPUNCTURE: CPT

## 2019-11-17 LAB
HAV AB SER QL IA: NEGATIVE
HAV IGM SERPL QL IA: NEGATIVE
HBV E AB SERPL QL IA: NEGATIVE
HBV E AG SERPL QL IA: NEGATIVE

## 2019-11-21 ENCOUNTER — OFFICE VISIT (OUTPATIENT)
Dept: MEDICAL GROUP | Facility: PHYSICIAN GROUP | Age: 39
End: 2019-11-21
Payer: COMMERCIAL

## 2019-11-21 ENCOUNTER — HOSPITAL ENCOUNTER (OUTPATIENT)
Dept: LAB | Facility: MEDICAL CENTER | Age: 39
End: 2019-11-21
Attending: INTERNAL MEDICINE
Payer: COMMERCIAL

## 2019-11-21 VITALS
BODY MASS INDEX: 25.77 KG/M2 | TEMPERATURE: 97.1 F | OXYGEN SATURATION: 99 % | RESPIRATION RATE: 16 BRPM | SYSTOLIC BLOOD PRESSURE: 110 MMHG | WEIGHT: 180 LBS | HEART RATE: 77 BPM | HEIGHT: 70 IN | DIASTOLIC BLOOD PRESSURE: 72 MMHG

## 2019-11-21 DIAGNOSIS — K64.9 HEMORRHOIDS, UNSPECIFIED HEMORRHOID TYPE: ICD-10-CM

## 2019-11-21 DIAGNOSIS — E03.9 HYPOTHYROIDISM, UNSPECIFIED TYPE: ICD-10-CM

## 2019-11-21 DIAGNOSIS — F10.20 ALCOHOL USE DISORDER, MODERATE, DEPENDENCE (HCC): ICD-10-CM

## 2019-11-21 DIAGNOSIS — R74.01 TRANSAMINITIS: ICD-10-CM

## 2019-11-21 DIAGNOSIS — Z23 NEED FOR VACCINATION: ICD-10-CM

## 2019-11-21 DIAGNOSIS — D50.0 IRON DEFICIENCY ANEMIA DUE TO CHRONIC BLOOD LOSS: ICD-10-CM

## 2019-11-21 DIAGNOSIS — D75.89 MACROCYTOSIS WITHOUT ANEMIA: ICD-10-CM

## 2019-11-21 LAB
INR PPP: 0.9 (ref 0.87–1.13)
PROTHROMBIN TIME: 12.3 SEC (ref 12–14.6)

## 2019-11-21 PROCEDURE — 85610 PROTHROMBIN TIME: CPT

## 2019-11-21 PROCEDURE — 99214 OFFICE O/P EST MOD 30 MIN: CPT | Mod: 25 | Performed by: INTERNAL MEDICINE

## 2019-11-21 PROCEDURE — 36415 COLL VENOUS BLD VENIPUNCTURE: CPT

## 2019-11-21 PROCEDURE — 90715 TDAP VACCINE 7 YRS/> IM: CPT | Performed by: INTERNAL MEDICINE

## 2019-11-21 PROCEDURE — 90471 IMMUNIZATION ADMIN: CPT | Performed by: INTERNAL MEDICINE

## 2019-11-21 NOTE — ASSESSMENT & PLAN NOTE
Ref. Range 11/11/2019 10:27   WBC Latest Ref Range: 4.8 - 10.8 K/uL 4.6 (L)   RBC Latest Ref Range: 4.70 - 6.10 M/uL 4.04 (L)   Hemoglobin Latest Ref Range: 14.0 - 18.0 g/dL 14.0   Hematocrit Latest Ref Range: 42.0 - 52.0 % 41.9 (L)   MCV Latest Ref Range: 81.4 - 97.8 fL 103.7 (H)   MCH Latest Ref Range: 27.0 - 33.0 pg 34.7 (H)   MCHC Latest Ref Range: 33.7 - 35.3 g/dL 33.4 (L)   RDW Latest Ref Range: 35.9 - 50.0 fL 53.7 (H)   Platelet Count Latest Ref Range: 164 - 446 K/uL 282   MPV Latest Ref Range: 9.0 - 12.9 fL 9.5   Education and counseling provided for cutting down on alcohol  Check B12 and folate  Control his hypothyroidism

## 2019-11-21 NOTE — ASSESSMENT & PLAN NOTE
Hx of intermittent rectal bleeding from hemorrhoid.   Currently no overt bleeding.        4/11/2018 EGD normal esophagus.  Normal mucosa in the second part of duodenum in duodenal bulb.  Erythema in the stomach antrum and stomach body.  Colonoscopy: External hemorrhoids.  Grade 4 internal hemorrhoids.  Normal mucosa in the whole colon.  Patient was referred to [facility] surgical associate for the hemorrhoids.  By Dr. Starks GI consultant. Surgery's contact information given to patient today.

## 2019-11-21 NOTE — ASSESSMENT & PLAN NOTE
Given patient's drinking hx, elevated liver enzymes, and negative viral hepatitis serologies, patient most likely alcoholic hepatitis.   Pending RUQ US, PT.  Need PT results to calculate Maddrey score to see the severity and if patient need glucocorticoid treatment.  Extensive education and counseling provided for alcohol cessation  Seek medical attention immediately if symptoms worsen or experiencing alcohol withdrawal symptoms.  Recommend patient to go to AA,  contact information of several Alcohol abuse treatment centers given to patient.    -     Prothrombin Time; Future  -     Comp Metabolic Panel; Future    Alcohol abstinence   Prevention and treatment of alcohol withdrawal  Fluid management  Nutritional support  Infection surveillance  Prophylaxis against gastric mucosal bleeding     Ref. Range 11/11/2019 10:27   Calcium Latest Ref Range: 8.5 - 10.5 mg/dL 9.3   AST(SGOT) Latest Ref Range: 12 - 45 U/L 263 (H)   ALT(SGPT) Latest Ref Range: 2 - 50 U/L 119 (H)   Alkaline Phosphatase Latest Ref Range: 30 - 99 U/L 102 (H)   Total Bilirubin Latest Ref Range: 0.1 - 1.5 mg/dL 1.1   Albumin Latest Ref Range: 3.2 - 4.9 g/dL 4.2   Total Protein Latest Ref Range: 6.0 - 8.2 g/dL 7.1   Globulin Latest Ref Range: 1.9 - 3.5 g/dL 2.9   A-G Ratio Latest Units: g/dL 1.4      Ref. Range 11/15/2019 15:51   Hepatitis A Virus Ab, IgM Latest Ref Range: Negative  Negative   Hep A Virus Antibody Total Latest Ref Range: Negative  Negative   Hep B Surface Antibody Quant Latest Ref Range: 0.00 - 10.00 mIU/mL 3.28   Hepatitis B Surface Antigen Latest Ref Range: Negative  Negative   Hepatitis B Cors Ab,IgM Latest Ref Range: Negative  Negative   Hepatitis Be Antigen Latest Ref Range: Negative  Negative   Hepatitis BE Antibody Latest Ref Range: Negative  Negative

## 2019-11-21 NOTE — ASSESSMENT & PLAN NOTE
Fatigue.  Patient is not compliant with synthroid, he states he was only take 50% of the time, he is more compliant since 11/11/19. Compliance counseling provided. Check TSH in six weeks  10/16/2019 TSH 10.82H, FT 0.52   Ref. Range 6/17/2019 16:03   Microsomal -Tpo- Abs Latest Ref Range: 0.0 - 9.0 IU/mL 228.2 (H)

## 2019-11-21 NOTE — ASSESSMENT & PLAN NOTE
Hx of intermittent rectal bleeding from hemorrhoid. Following with GI.   No rectal bleeding today.   His ferritin and H&H   Ref. Range 6/7/2019 15:05 11/11/2019 10:27   WBC Latest Ref Range: 4.8 - 10.8 K/uL 3.9 (L) 4.6 (L)   RBC Latest Ref Range: 4.70 - 6.10 M/uL 4.47 (L) 4.04 (L)   Hemoglobin Latest Ref Range: 14.0 - 18.0 g/dL 11.5 (L) 14.0   Hematocrit Latest Ref Range: 42.0 - 52.0 % 38.7 (L) 41.9 (L)   MCV Latest Ref Range: 81.4 - 97.8 fL 86.6 103.7 (H)   MCH Latest Ref Range: 27.0 - 33.0 pg 25.7 (L) 34.7 (H)   MCHC Latest Ref Range: 33.7 - 35.3 g/dL 29.7 (L) 33.4 (L)   RDW Latest Ref Range: 35.9 - 50.0 fL 53.3 (H) 53.7 (H)   Platelet Count Latest Ref Range: 164 - 446 K/uL 375 282       4/11/2018 EGD normal esophagus.  Normal mucosa in the second part of duodenum in duodenal bulb.  Erythema in the stomach antrum and stomach body.  Colonoscopy: External hemorrhoids.  Grade 4 internal hemorrhoids.  Normal mucosa in the whole colon.  Patient was referred to [facility] surgical associate for the hemorrhoids.  By Dr. Starks GI consultant     Ref. Range 6/7/2019 15:05 11/11/2019 10:27   Ferritin Latest Ref Range: 22.0 - 322.0 ng/mL 10.7 (L) 51.4       Chronic hx of iron deficiency anemia due to hemorrhoids. Every other month he would have exacerbation of symptoms. He was taking 2 tablet of iron supplement daily.   Recommend patient not to overstrain when having BM. However, his OCD is making him hard to stop keep pushing even after all of BM has been passed.Pending appointment with behavioral medicine to OCD.   Recommend patient follow up with GI Dr. Starks  Stop alcohol use.   Recommend patient also make an appointment with surgery as patient has severe internal and external hemorrhoid. Surgery's contact info given to patient today.  Seek medical attention immediately for rectal bleeding.

## 2019-11-21 NOTE — PROGRESS NOTES
Established Patient    Adis Chance is a 39 y.o. male who presents today with the following:    CC:   Chief Complaint   Patient presents with   • Follow-Up     lab results       HPI:     Hypothyroidism  Fatigue.  Patient is not compliant with synthroid, he states he was only take 50% of the time, he is more compliant since 11/11/19. Compliance counseling provided. Check TSH in six weeks  10/16/2019 TSH 10.82H, FT 0.52   Ref. Range 6/17/2019 16:03   Microsomal -Tpo- Abs Latest Ref Range: 0.0 - 9.0 IU/mL 228.2 (H)         Macrocytosis without anemia     Ref. Range 11/11/2019 10:27   WBC Latest Ref Range: 4.8 - 10.8 K/uL 4.6 (L)   RBC Latest Ref Range: 4.70 - 6.10 M/uL 4.04 (L)   Hemoglobin Latest Ref Range: 14.0 - 18.0 g/dL 14.0   Hematocrit Latest Ref Range: 42.0 - 52.0 % 41.9 (L)   MCV Latest Ref Range: 81.4 - 97.8 fL 103.7 (H)   MCH Latest Ref Range: 27.0 - 33.0 pg 34.7 (H)   MCHC Latest Ref Range: 33.7 - 35.3 g/dL 33.4 (L)   RDW Latest Ref Range: 35.9 - 50.0 fL 53.7 (H)   Platelet Count Latest Ref Range: 164 - 446 K/uL 282   MPV Latest Ref Range: 9.0 - 12.9 fL 9.5   Education and counseling provided for cutting down on alcohol  Check B12 and folate  Control his hypothyroidism      Hemorrhoids  Hx of intermittent rectal bleeding from hemorrhoid.   Currently no overt bleeding.        4/11/2018 EGD normal esophagus.  Normal mucosa in the second part of duodenum in duodenal bulb.  Erythema in the stomach antrum and stomach body.  Colonoscopy: External hemorrhoids.  Grade 4 internal hemorrhoids.  Normal mucosa in the whole colon.  Patient was referred to [facility] surgical associate for the hemorrhoids.  By Dr. Starks GI consultant. Surgery's contact information given to patient today.         Iron deficiency anemia due to chronic blood loss  Hx of intermittent rectal bleeding from hemorrhoid. Following with GI.   No rectal bleeding today.   His ferritin and H&H   Ref. Range 6/7/2019 15:05 11/11/2019 10:27   WBC  Latest Ref Range: 4.8 - 10.8 K/uL 3.9 (L) 4.6 (L)   RBC Latest Ref Range: 4.70 - 6.10 M/uL 4.47 (L) 4.04 (L)   Hemoglobin Latest Ref Range: 14.0 - 18.0 g/dL 11.5 (L) 14.0   Hematocrit Latest Ref Range: 42.0 - 52.0 % 38.7 (L) 41.9 (L)   MCV Latest Ref Range: 81.4 - 97.8 fL 86.6 103.7 (H)   MCH Latest Ref Range: 27.0 - 33.0 pg 25.7 (L) 34.7 (H)   MCHC Latest Ref Range: 33.7 - 35.3 g/dL 29.7 (L) 33.4 (L)   RDW Latest Ref Range: 35.9 - 50.0 fL 53.3 (H) 53.7 (H)   Platelet Count Latest Ref Range: 164 - 446 K/uL 375 282       4/11/2018 EGD normal esophagus.  Normal mucosa in the second part of duodenum in duodenal bulb.  Erythema in the stomach antrum and stomach body.  Colonoscopy: External hemorrhoids.  Grade 4 internal hemorrhoids.  Normal mucosa in the whole colon.  Patient was referred to [facility] surgical associate for the hemorrhoids.  By Dr. Starks GI consultant     Ref. Range 6/7/2019 15:05 11/11/2019 10:27   Ferritin Latest Ref Range: 22.0 - 322.0 ng/mL 10.7 (L) 51.4       Chronic hx of iron deficiency anemia due to hemorrhoids. Every other month he would have exacerbation of symptoms. He was taking 2 tablet of iron supplement daily.   Recommend patient not to overstrain when having BM. However, his OCD is making him hard to stop keep pushing even after all of BM has been passed. Pending appointment with behavioral medicine to OCD.   Recommend patient follow up with GI Dr. Starks  Stop alcohol use.   Recommend patient also make an appointment with surgery as patient has severe internal and external hemorrhoid. Surgery's contact info given to patient today.  Seek medical attention immediately for rectal bleeding.         Transaminitis  Given patient's drinking hx, elevated liver enzymes, and negative viral hepatitis serologies, patient most likely alcoholic hepatitis.   Pending RUQ US, PT.  Need PT results to calculate Maddrey score to see if patient need glucocorticoid treatment.  Extensive education and  counseling provided for alcohol cessation  Consider inpatient treatment if not able to stop alcohol as outpatient.      Ref. Range 11/11/2019 10:27   Calcium Latest Ref Range: 8.5 - 10.5 mg/dL 9.3   AST(SGOT) Latest Ref Range: 12 - 45 U/L 263 (H)   ALT(SGPT) Latest Ref Range: 2 - 50 U/L 119 (H)   Alkaline Phosphatase Latest Ref Range: 30 - 99 U/L 102 (H)   Total Bilirubin Latest Ref Range: 0.1 - 1.5 mg/dL 1.1   Albumin Latest Ref Range: 3.2 - 4.9 g/dL 4.2   Total Protein Latest Ref Range: 6.0 - 8.2 g/dL 7.1   Globulin Latest Ref Range: 1.9 - 3.5 g/dL 2.9   A-G Ratio Latest Units: g/dL 1.4      Ref. Range 11/15/2019 15:51   Hepatitis A Virus Ab, IgM Latest Ref Range: Negative  Negative   Hep A Virus Antibody Total Latest Ref Range: Negative  Negative   Hep B Surface Antibody Quant Latest Ref Range: 0.00 - 10.00 mIU/mL 3.28   Hepatitis B Surface Antigen Latest Ref Range: Negative  Negative   Hepatitis B Cors Ab,IgM Latest Ref Range: Negative  Negative   Hepatitis Be Antigen Latest Ref Range: Negative  Negative   Hepatitis BE Antibody Latest Ref Range: Negative  Negative         Alcohol use disorder, moderate, dependence (HCC)  Hx of alcohol use, a couple of drinks per day. Recommend patient to go to , several Alcohol abuse treatment center contact information given to patient.  Education and counseling provided for cutting down and eventually wean off on alcohol        Current Outpatient Medications   Medication Sig Dispense Refill   • levothyroxine (SYNTHROID) 50 MCG Tab TAKE 1 TABLET BY MOUTH EVERY MORNING ON AN EMPTY STOMACH 30 Tab 2   • ferrous sulfate 325 (65 Fe) MG tablet TAKE 1 TAB BY MOUTH 2 TIMES A DAY FOR 90 DAYS. 60 Tab 2   • rabeprazole (ACIPHEX) 20 MG tablet Take 1 Tab by mouth 2 times a day. 60 Tab 11   • cloNIDine (CATAPRESS) 0.3 MG Tab Take 1 Tab by mouth 3 times a day. 270 Tab 1     No current facility-administered medications for this visit.        Allergies, past medical history, past surgical  "history, medications, family history, social history reviewed and updated.    ROS   Constitutional: Denies fevers or chills  Eyes: Denies changes in vision  Ears/Nose/Throat/Mouth: Denies nasal congestion or sore throat   Cardiovascular: Denies chest pain or palpitations   Respiratory: Denies shortness of breath , Denies cough  Gastrointestinal/Hepatic: intermittent painful hemorrhoid Denies abd pain, nausea, vomiting, diarrhea, constipation  Genitourinary: Denies dysuria or frequency  Musculoskeletal/Rheum: Denies joint pain and swelling   Neurological: Denies headache  Psychiatric: mood stable  Endocrine: hypothyroidism Denies hx of diabetes  Heme/Oncology/Lymph Nodes: Denies weight changes or enlarged LNs.    Physical Exam  Vitals: /72 (BP Location: Right arm, Patient Position: Sitting, BP Cuff Size: Adult)   Pulse 77   Temp 36.2 °C (97.1 °F) (Temporal)   Resp 16   Ht 1.778 m (5' 10\")   Wt 81.6 kg (180 lb)   SpO2 99%   BMI 25.83 kg/m²   General: Alert, pleasant, NAD  HEENT: Normocephalic.  EOMI, no icterus or pallor.  Conjunctivae and lids normal. External ears normal. Oropharynx non-erythematous, mucous membranes moist.  Neck supple.  No thyromegaly or masses palpated.   Lymph: No cervical or supraclavicular lymphadenopathy.  Cardiovascular: Regular rate and rhythm.  S1 and S2 normal.  No murmurs appreciated.  Respiratory: Normal respiratory effort.  Clear to auscultation bilaterally.  Abdomen: Non-distended, soft, non-tender,   Skin: Warm, dry, no rashes.  Musculoskeletal: Gait is normal.  Moves all extremities well.  Extremities: No leg edema.  Pedal pulses 2+ symmetric.   Psych:  Affect/mood is normal, judgement is good, memory is intact, grooming is appropriate.      Labs (11/11/19) were reviewed and discussed with patients.  All questions were answered.      Assessment and Plan    Adis was seen today for follow-up.    Diagnoses and all orders for this visit:    Transaminitis  Alcohol use " disorder, moderate, dependence (HCC)  Given patient's drinking hx, elevated liver enzymes, and negative viral hepatitis serologies, patient most likely alcoholic hepatitis.   Pending RUQ US, PT.  Need PT results to calculate Maddrey score to see the severity and if patient need glucocorticoid treatment.  Extensive education and counseling provided for alcohol cessation  Seek medical attention immediately if symptoms worsen or experiencing alcohol withdrawal symptoms.  Recommend patient to go to AA,  contact information of several Alcohol abuse treatment centers given to patient.    -     Prothrombin Time; Future  -     Comp Metabolic Panel; Future  Close monitor his liver function  Alcohol abstinence   Prevention and treatment of alcohol withdrawal  Fluid management  Nutritional support  Infection surveillance  Prophylaxis against gastric mucosal bleeding    Recommend patient to update his GI doctor about this as well to see if they have any recommendations    Hypothyroidism, unspecified type  -     TSH WITH REFLEX TO FT4; Future  Continue synthroid  Compliance education provided.    Macrocytosis without anemia  -     VITAMIN B12; Future  -     CBC WITH DIFFERENTIAL; Future  -     FOLATE; Future    Hemorrhoids, unspecified hemorrhoid type  Follow with GI and surgery    Iron deficiency anemia due to chronic blood loss  Stable. Likely related to his internal and external hemorrhoid   Monitor for any bleeding  Follow with GI and surgery  Seek medical attention immediately if any worsening bleeding    Need for vaccination  TDAP today        Follow-up:Return in about 1 month (around 12/21/2019), or if symptoms worsen or fail to improve.    This note was created using voice recognition software. There may be unintended errors in spelling, grammar or content.

## 2019-11-21 NOTE — ASSESSMENT & PLAN NOTE
Hx of alcohol use, a couple of drinks per day. Recommend patient to go to AA, several Alcohol abuse treatment center contact information given to patient.  Education and counseling provided for cutting down and eventually wean off on alcohol

## 2019-11-22 ENCOUNTER — HOSPITAL ENCOUNTER (OUTPATIENT)
Dept: LAB | Facility: MEDICAL CENTER | Age: 39
End: 2019-11-22
Attending: INTERNAL MEDICINE
Payer: COMMERCIAL

## 2019-11-22 DIAGNOSIS — D75.89 MACROCYTOSIS WITHOUT ANEMIA: ICD-10-CM

## 2019-11-22 DIAGNOSIS — R74.01 TRANSAMINITIS: ICD-10-CM

## 2019-11-22 DIAGNOSIS — E03.9 HYPOTHYROIDISM, UNSPECIFIED TYPE: ICD-10-CM

## 2019-11-22 LAB
ALBUMIN SERPL BCP-MCNC: 4.4 G/DL (ref 3.2–4.9)
ALBUMIN/GLOB SERPL: 1.5 G/DL
ALP SERPL-CCNC: 110 U/L (ref 30–99)
ALT SERPL-CCNC: 82 U/L (ref 2–50)
ANION GAP SERPL CALC-SCNC: 14 MMOL/L (ref 0–11.9)
AST SERPL-CCNC: 138 U/L (ref 12–45)
BASOPHILS # BLD AUTO: 1.6 % (ref 0–1.8)
BASOPHILS # BLD: 0.08 K/UL (ref 0–0.12)
BILIRUB SERPL-MCNC: 1.4 MG/DL (ref 0.1–1.5)
BUN SERPL-MCNC: 5 MG/DL (ref 8–22)
CALCIUM SERPL-MCNC: 9 MG/DL (ref 8.5–10.5)
CHLORIDE SERPL-SCNC: 104 MMOL/L (ref 96–112)
CO2 SERPL-SCNC: 22 MMOL/L (ref 20–33)
CREAT SERPL-MCNC: 0.94 MG/DL (ref 0.5–1.4)
EOSINOPHIL # BLD AUTO: 0.18 K/UL (ref 0–0.51)
EOSINOPHIL NFR BLD: 3.5 % (ref 0–6.9)
ERYTHROCYTE [DISTWIDTH] IN BLOOD BY AUTOMATED COUNT: 49.8 FL (ref 35.9–50)
FOLATE SERPL-MCNC: 4.9 NG/ML
GLOBULIN SER CALC-MCNC: 2.9 G/DL (ref 1.9–3.5)
GLUCOSE SERPL-MCNC: 96 MG/DL (ref 65–99)
HCT VFR BLD AUTO: 43.2 % (ref 42–52)
HGB BLD-MCNC: 14.7 G/DL (ref 14–18)
IMM GRANULOCYTES # BLD AUTO: 0.01 K/UL (ref 0–0.11)
IMM GRANULOCYTES NFR BLD AUTO: 0.2 % (ref 0–0.9)
LYMPHOCYTES # BLD AUTO: 1.59 K/UL (ref 1–4.8)
LYMPHOCYTES NFR BLD: 31.1 % (ref 22–41)
MCH RBC QN AUTO: 33.9 PG (ref 27–33)
MCHC RBC AUTO-ENTMCNC: 34 G/DL (ref 33.7–35.3)
MCV RBC AUTO: 99.8 FL (ref 81.4–97.8)
MONOCYTES # BLD AUTO: 0.48 K/UL (ref 0–0.85)
MONOCYTES NFR BLD AUTO: 9.4 % (ref 0–13.4)
NEUTROPHILS # BLD AUTO: 2.78 K/UL (ref 1.82–7.42)
NEUTROPHILS NFR BLD: 54.2 % (ref 44–72)
NRBC # BLD AUTO: 0 K/UL
NRBC BLD-RTO: 0 /100 WBC
PLATELET # BLD AUTO: 276 K/UL (ref 164–446)
PMV BLD AUTO: 8.7 FL (ref 9–12.9)
POTASSIUM SERPL-SCNC: 3.4 MMOL/L (ref 3.6–5.5)
PROT SERPL-MCNC: 7.3 G/DL (ref 6–8.2)
RBC # BLD AUTO: 4.33 M/UL (ref 4.7–6.1)
SODIUM SERPL-SCNC: 140 MMOL/L (ref 135–145)
T4 FREE SERPL-MCNC: 0.75 NG/DL (ref 0.53–1.43)
TSH SERPL DL<=0.005 MIU/L-ACNC: 10.77 UIU/ML (ref 0.38–5.33)
VIT B12 SERPL-MCNC: 555 PG/ML (ref 211–911)
WBC # BLD AUTO: 5.1 K/UL (ref 4.8–10.8)

## 2019-11-22 PROCEDURE — 84443 ASSAY THYROID STIM HORMONE: CPT

## 2019-11-22 PROCEDURE — 84439 ASSAY OF FREE THYROXINE: CPT

## 2019-11-22 PROCEDURE — 85025 COMPLETE CBC W/AUTO DIFF WBC: CPT

## 2019-11-22 PROCEDURE — 82607 VITAMIN B-12: CPT

## 2019-11-22 PROCEDURE — 36415 COLL VENOUS BLD VENIPUNCTURE: CPT

## 2019-11-22 PROCEDURE — 80053 COMPREHEN METABOLIC PANEL: CPT

## 2019-11-22 PROCEDURE — 82746 ASSAY OF FOLIC ACID SERUM: CPT

## 2019-11-25 DIAGNOSIS — E87.6 HYPOKALEMIA: ICD-10-CM

## 2019-11-25 DIAGNOSIS — R74.01 TRANSAMINITIS: ICD-10-CM

## 2019-11-25 DIAGNOSIS — E03.9 HYPOTHYROIDISM, UNSPECIFIED TYPE: ICD-10-CM

## 2019-11-25 RX ORDER — POTASSIUM CHLORIDE 20 MEQ/1
20 TABLET, EXTENDED RELEASE ORAL 2 TIMES DAILY
Qty: 4 TAB | Refills: 0 | Status: SHIPPED | OUTPATIENT
Start: 2019-11-25 | End: 2019-11-27

## 2019-11-26 NOTE — PROGRESS NOTES
Compliance education provided for synthroid will check TSH in 6-8 weeks and adjust synthroid base on TSH  Recommend ETOH cessation, monitor CMP  K 3.4 will replace.  Patient states that he has some cold symptoms and taking OTC cold medicine. Recommend follow up with us or urgent care as appropriate.

## 2019-12-10 ENCOUNTER — HOSPITAL ENCOUNTER (OUTPATIENT)
Facility: MEDICAL CENTER | Age: 39
End: 2019-12-10
Attending: PHYSICIAN ASSISTANT
Payer: COMMERCIAL

## 2019-12-10 ENCOUNTER — OFFICE VISIT (OUTPATIENT)
Dept: URGENT CARE | Facility: CLINIC | Age: 39
End: 2019-12-10
Payer: COMMERCIAL

## 2019-12-10 VITALS
BODY MASS INDEX: 24.91 KG/M2 | OXYGEN SATURATION: 100 % | WEIGHT: 174 LBS | DIASTOLIC BLOOD PRESSURE: 102 MMHG | HEIGHT: 70 IN | RESPIRATION RATE: 14 BRPM | TEMPERATURE: 98.1 F | HEART RATE: 75 BPM | SYSTOLIC BLOOD PRESSURE: 142 MMHG

## 2019-12-10 DIAGNOSIS — R30.9 PAINFUL URINATION: ICD-10-CM

## 2019-12-10 LAB
APPEARANCE UR: CLEAR
BILIRUB UR STRIP-MCNC: NEGATIVE MG/DL
COLOR UR AUTO: YELLOW
GLUCOSE UR STRIP.AUTO-MCNC: NEGATIVE MG/DL
KETONES UR STRIP.AUTO-MCNC: NEGATIVE MG/DL
LEUKOCYTE ESTERASE UR QL STRIP.AUTO: NEGATIVE
NITRITE UR QL STRIP.AUTO: NEGATIVE
PH UR STRIP.AUTO: 6.5 [PH] (ref 5–8)
PROT UR QL STRIP: NEGATIVE MG/DL
RBC UR QL AUTO: NEGATIVE
SP GR UR STRIP.AUTO: 1.01
UROBILINOGEN UR STRIP-MCNC: NEGATIVE MG/DL

## 2019-12-10 PROCEDURE — 87591 N.GONORRHOEAE DNA AMP PROB: CPT

## 2019-12-10 PROCEDURE — 87491 CHLMYD TRACH DNA AMP PROBE: CPT

## 2019-12-10 PROCEDURE — 87086 URINE CULTURE/COLONY COUNT: CPT

## 2019-12-10 PROCEDURE — 99214 OFFICE O/P EST MOD 30 MIN: CPT | Performed by: PHYSICIAN ASSISTANT

## 2019-12-10 PROCEDURE — 81002 URINALYSIS NONAUTO W/O SCOPE: CPT | Performed by: PHYSICIAN ASSISTANT

## 2019-12-10 RX ORDER — PHENAZOPYRIDINE HYDROCHLORIDE 200 MG/1
200 TABLET, FILM COATED ORAL 3 TIMES DAILY
Qty: 6 TAB | Refills: 0 | Status: SHIPPED | OUTPATIENT
Start: 2019-12-10 | End: 2019-12-12

## 2019-12-10 RX ORDER — CIPROFLOXACIN 500 MG/1
500 TABLET, FILM COATED ORAL EVERY 12 HOURS
Qty: 14 TAB | Refills: 0 | Status: SHIPPED | OUTPATIENT
Start: 2019-12-10 | End: 2019-12-17

## 2019-12-10 RX ORDER — ONDANSETRON 4 MG/1
4 TABLET, FILM COATED ORAL EVERY 4 HOURS PRN
Qty: 20 TAB | Refills: 0 | Status: SHIPPED | OUTPATIENT
Start: 2019-12-10 | End: 2019-12-13 | Stop reason: SDUPTHER

## 2019-12-10 ASSESSMENT — ENCOUNTER SYMPTOMS
PALPITATIONS: 0
COUGH: 0
NAUSEA: 1
ABDOMINAL PAIN: 0
VOMITING: 1
BACK PAIN: 0
CHILLS: 0
FLANK PAIN: 0
FEVER: 0
SHORTNESS OF BREATH: 0

## 2019-12-11 LAB
C TRACH DNA SPEC QL NAA+PROBE: NEGATIVE
N GONORRHOEA DNA SPEC QL NAA+PROBE: NEGATIVE
SPECIMEN SOURCE: NORMAL

## 2019-12-11 NOTE — PROGRESS NOTES
Subjective:      Adis Chance is a 39 y.o. male who presents with Painful Urination; Emesis; and Nausea            Dysuria    This is a new problem. The current episode started in the past 7 days. The problem occurs every urination. The problem has been unchanged. Associated symptoms include frequency, hesitancy, nausea, urgency and vomiting. Pertinent negatives include no chills, flank pain or hematuria. He has tried nothing for the symptoms. The treatment provided no relief.       Review of Systems   Constitutional: Negative for chills and fever.   Respiratory: Negative for cough and shortness of breath.    Cardiovascular: Negative for chest pain and palpitations.   Gastrointestinal: Positive for nausea and vomiting. Negative for abdominal pain.   Genitourinary: Positive for dysuria, frequency, hesitancy and urgency. Negative for flank pain and hematuria.   Musculoskeletal: Negative for back pain.   All other systems reviewed and are negative.    PMH:  has a past medical history of GERD (gastroesophageal reflux disease), IBS (irritable bowel syndrome), OCD (obsessive compulsive disorder), Thyroid disease, and Tourette's.  MEDS:   Current Outpatient Medications:   •  ciprofloxacin (CIPRO) 500 MG Tab, Take 1 Tab by mouth every 12 hours for 7 days., Disp: 14 Tab, Rfl: 0  •  levothyroxine (SYNTHROID) 50 MCG Tab, TAKE 1 TABLET BY MOUTH EVERY MORNING ON AN EMPTY STOMACH, Disp: 30 Tab, Rfl: 2  •  ferrous sulfate 325 (65 Fe) MG tablet, TAKE 1 TAB BY MOUTH 2 TIMES A DAY FOR 90 DAYS., Disp: 60 Tab, Rfl: 2  •  rabeprazole (ACIPHEX) 20 MG tablet, Take 1 Tab by mouth 2 times a day., Disp: 60 Tab, Rfl: 11  •  cloNIDine (CATAPRESS) 0.3 MG Tab, Take 1 Tab by mouth 3 times a day., Disp: 270 Tab, Rfl: 1  ALLERGIES: No Known Allergies  SURGHX:   Past Surgical History:   Procedure Laterality Date   • APPENDECTOMY  2018     SOCHX:  reports that he has never smoked. He has never used smokeless tobacco. He reports current alcohol use.  "He reports that he does not use drugs.  FH: Family history was reviewed, no pertinent findings to report       Objective:     Blood Pressure 142/102 (BP Location: Right arm, Patient Position: Sitting)   Pulse 75   Temperature 36.7 °C (98.1 °F)   Respiration 14   Height 1.778 m (5' 10\")   Weight 78.9 kg (174 lb)   Oxygen Saturation 100%   Body Mass Index 24.97 kg/m²      Physical Exam  Vitals signs reviewed.   Constitutional:       Appearance: He is well-developed.   HENT:      Head: Normocephalic and atraumatic.      Right Ear: External ear normal.      Left Ear: External ear normal.      Nose: Nose normal.   Neck:      Musculoskeletal: Normal range of motion and neck supple.   Cardiovascular:      Rate and Rhythm: Normal rate and regular rhythm.      Heart sounds: Normal heart sounds.   Pulmonary:      Effort: Pulmonary effort is normal.      Breath sounds: Normal breath sounds.   Abdominal:      General: Bowel sounds are normal. There is no distension.      Palpations: Abdomen is soft. There is no mass.      Tenderness: There is no tenderness. There is no guarding or rebound.      Hernia: No hernia is present.   Musculoskeletal:         General: No tenderness.      Comments: No CVA tenderness present.   Skin:     General: Skin is warm and dry.   Neurological:      Mental Status: He is alert and oriented to person, place, and time.   Psychiatric:         Behavior: Behavior normal.         Thought Content: Thought content normal.         Judgment: Judgment normal.              Results for orders placed or performed in visit on 12/10/19   POCT Urinalysis   Result Value Ref Range    POC Color Yellow Negative    POC Appearance Clear Negative    POC Leukocyte Esterase Negative Negative    POC Nitrites Negative Negative    POC Urobiligen Negative Negative (0.2) mg/dL    POC Protein Negative Negative mg/dL    POC Urine PH 6.5 5.0 - 8.0    POC Blood Negative Negative    POC Specific Gravity 1.010 <1.005 - >1.030    " POC Ketones Negative Negative mg/dL    POC Bilirubin Negative Negative mg/dL    POC Glucose Negative Negative mg/dL       Assessment/Plan:   Patient is a 39-year-old male who presents with symptoms of dysuria nausea and vomiting for 3 days.  He denies fevers chills or abdominal pain.  He is sexually active.  Vital signs within normal limits.  Abdominal exam is unremarkable.  UA is unremarkable.  Exam of the penis is unremarkable.  At this time due to his symptoms we will treat for possible UTI while culture is pending.      1. Painful urination    - POCT Urinalysis  - Urine Culture; Future  - ciprofloxacin (CIPRO) 500 MG Tab; Take 1 Tab by mouth every 12 hours for 7 days.  Dispense: 14 Tab; Refill: 0  - Chlamydia/GC PCR Urine Or Swab; Future    Differential diagnosis, natural history, supportive care discussed. Follow-up with primary care provider within 7-10 days, emergency room precautions discussed.  Patient and/or family appears understanding of information.  Handout and review of patients diagnosis and treatment was discussed extensively.

## 2019-12-13 DIAGNOSIS — F95.2 TOURETTE DISORDER: ICD-10-CM

## 2019-12-13 DIAGNOSIS — R30.9 PAINFUL URINATION: ICD-10-CM

## 2019-12-13 LAB
BACTERIA UR CULT: NORMAL
SIGNIFICANT IND 70042: NORMAL
SITE SITE: NORMAL
SOURCE SOURCE: NORMAL

## 2019-12-13 RX ORDER — ONDANSETRON 4 MG/1
4 TABLET, FILM COATED ORAL EVERY 4 HOURS PRN
Qty: 20 TAB | Refills: 0 | Status: SHIPPED | OUTPATIENT
Start: 2019-12-13 | End: 2019-12-20

## 2019-12-13 RX ORDER — CLONIDINE HYDROCHLORIDE 0.3 MG/1
0.3 TABLET ORAL 3 TIMES DAILY
Qty: 270 TAB | Refills: 1 | Status: SHIPPED | OUTPATIENT
Start: 2019-12-13 | End: 2019-12-16 | Stop reason: SDUPTHER

## 2019-12-13 NOTE — TELEPHONE ENCOUNTER
Patient came in requesting a refill of his Clonidine 0.3mg script, he would like this sent to [facility] in [place].     He is also requesting a refill of his Ondansetron 4mg script which he got from the Urgent Care.  He would like the script sent to [facility] here in [facility].     The patient would like a call back when they have been submitted

## 2019-12-16 DIAGNOSIS — F95.2 TOURETTE DISORDER: ICD-10-CM

## 2019-12-16 RX ORDER — CLONIDINE HYDROCHLORIDE 0.3 MG/1
0.3 TABLET ORAL 3 TIMES DAILY
Qty: 270 TAB | Refills: 1 | Status: SHIPPED | OUTPATIENT
Start: 2019-12-16 | End: 2020-02-11 | Stop reason: SDUPTHER

## 2019-12-16 NOTE — TELEPHONE ENCOUNTER
Patient is requesting that his most recent refill of Clonidine be sent to [facility] instead of [facility] where it was originally sent.

## 2020-01-05 DIAGNOSIS — D50.0 IRON DEFICIENCY ANEMIA DUE TO CHRONIC BLOOD LOSS: ICD-10-CM

## 2020-01-06 RX ORDER — FERROUS SULFATE 325(65) MG
TABLET ORAL
Qty: 60 TAB | Refills: 2 | Status: SHIPPED | OUTPATIENT
Start: 2020-01-06 | End: 2020-02-12

## 2020-02-06 ENCOUNTER — HOSPITAL ENCOUNTER (OUTPATIENT)
Dept: LAB | Facility: MEDICAL CENTER | Age: 40
End: 2020-02-06
Attending: INTERNAL MEDICINE
Payer: COMMERCIAL

## 2020-02-06 DIAGNOSIS — R74.01 TRANSAMINITIS: ICD-10-CM

## 2020-02-06 DIAGNOSIS — E03.9 HYPOTHYROIDISM, UNSPECIFIED TYPE: ICD-10-CM

## 2020-02-06 LAB
ALBUMIN SERPL BCP-MCNC: 4 G/DL (ref 3.2–4.9)
ALBUMIN/GLOB SERPL: 1.3 G/DL
ALP SERPL-CCNC: 80 U/L (ref 30–99)
ALT SERPL-CCNC: 44 U/L (ref 2–50)
ANION GAP SERPL CALC-SCNC: 11 MMOL/L (ref 0–11.9)
AST SERPL-CCNC: 69 U/L (ref 12–45)
BILIRUB SERPL-MCNC: 1.3 MG/DL (ref 0.1–1.5)
BUN SERPL-MCNC: 8 MG/DL (ref 8–22)
CALCIUM SERPL-MCNC: 8.9 MG/DL (ref 8.5–10.5)
CHLORIDE SERPL-SCNC: 99 MMOL/L (ref 96–112)
CO2 SERPL-SCNC: 24 MMOL/L (ref 20–33)
CREAT SERPL-MCNC: 1.03 MG/DL (ref 0.5–1.4)
GLOBULIN SER CALC-MCNC: 3.1 G/DL (ref 1.9–3.5)
GLUCOSE SERPL-MCNC: 90 MG/DL (ref 65–99)
POTASSIUM SERPL-SCNC: 3.7 MMOL/L (ref 3.6–5.5)
PROT SERPL-MCNC: 7.1 G/DL (ref 6–8.2)
SODIUM SERPL-SCNC: 134 MMOL/L (ref 135–145)
TSH SERPL DL<=0.005 MIU/L-ACNC: 6.52 UIU/ML (ref 0.38–5.33)

## 2020-02-06 PROCEDURE — 84439 ASSAY OF FREE THYROXINE: CPT

## 2020-02-06 PROCEDURE — 36415 COLL VENOUS BLD VENIPUNCTURE: CPT

## 2020-02-06 PROCEDURE — 84443 ASSAY THYROID STIM HORMONE: CPT

## 2020-02-06 PROCEDURE — 80053 COMPREHEN METABOLIC PANEL: CPT

## 2020-02-07 LAB — T4 FREE SERPL-MCNC: 0.82 NG/DL (ref 0.53–1.43)

## 2020-02-11 ENCOUNTER — OFFICE VISIT (OUTPATIENT)
Dept: MEDICAL GROUP | Facility: PHYSICIAN GROUP | Age: 40
End: 2020-02-11
Payer: COMMERCIAL

## 2020-02-11 VITALS
DIASTOLIC BLOOD PRESSURE: 82 MMHG | HEART RATE: 81 BPM | BODY MASS INDEX: 25.48 KG/M2 | SYSTOLIC BLOOD PRESSURE: 132 MMHG | RESPIRATION RATE: 14 BRPM | OXYGEN SATURATION: 96 % | TEMPERATURE: 98.7 F | HEIGHT: 70 IN | WEIGHT: 178 LBS

## 2020-02-11 DIAGNOSIS — M79.674 PAIN OF RIGHT GREAT TOE: ICD-10-CM

## 2020-02-11 DIAGNOSIS — Z86.2 HISTORY OF IRON DEFICIENCY ANEMIA: ICD-10-CM

## 2020-02-11 DIAGNOSIS — R74.01 TRANSAMINITIS: ICD-10-CM

## 2020-02-11 DIAGNOSIS — D50.0 IRON DEFICIENCY ANEMIA DUE TO CHRONIC BLOOD LOSS: ICD-10-CM

## 2020-02-11 DIAGNOSIS — F95.2 TOURETTE DISORDER: ICD-10-CM

## 2020-02-11 DIAGNOSIS — E03.9 ACQUIRED HYPOTHYROIDISM: ICD-10-CM

## 2020-02-11 DIAGNOSIS — H53.9 VISION CHANGES: ICD-10-CM

## 2020-02-11 DIAGNOSIS — F10.20 ALCOHOL USE DISORDER, MODERATE, DEPENDENCE (HCC): ICD-10-CM

## 2020-02-11 DIAGNOSIS — Z23 NEED FOR VACCINATION: ICD-10-CM

## 2020-02-11 PROCEDURE — 99214 OFFICE O/P EST MOD 30 MIN: CPT | Mod: 25 | Performed by: INTERNAL MEDICINE

## 2020-02-11 PROCEDURE — 90746 HEPB VACCINE 3 DOSE ADULT IM: CPT | Performed by: INTERNAL MEDICINE

## 2020-02-11 PROCEDURE — 90471 IMMUNIZATION ADMIN: CPT | Performed by: INTERNAL MEDICINE

## 2020-02-11 RX ORDER — CLONIDINE HYDROCHLORIDE 0.3 MG/1
0.3 TABLET ORAL 3 TIMES DAILY
Qty: 270 TAB | Refills: 1 | Status: SHIPPED | OUTPATIENT
Start: 2020-02-11 | End: 2020-02-11 | Stop reason: SDUPTHER

## 2020-02-11 RX ORDER — PANTOPRAZOLE SODIUM 40 MG/1
40 TABLET, DELAYED RELEASE ORAL DAILY
COMMUNITY
Start: 2020-02-05 | End: 2022-04-28

## 2020-02-11 RX ORDER — LEVOTHYROXINE SODIUM 0.07 MG/1
75 TABLET ORAL
Qty: 90 TAB | Refills: 0 | Status: SHIPPED | OUTPATIENT
Start: 2020-02-11 | End: 2020-08-31 | Stop reason: SDUPTHER

## 2020-02-11 RX ORDER — FAMOTIDINE 40 MG/1
40 TABLET, FILM COATED ORAL
COMMUNITY
Start: 2020-02-05 | End: 2020-09-17

## 2020-02-11 RX ORDER — CLONIDINE HYDROCHLORIDE 0.3 MG/1
0.3 TABLET ORAL 3 TIMES DAILY
Qty: 270 TAB | Refills: 1 | Status: SHIPPED | OUTPATIENT
Start: 2020-02-11 | End: 2020-12-03

## 2020-02-11 NOTE — ASSESSMENT & PLAN NOTE
Hx of alcohol use, a couple of drinks per day. Recommend patient to go to , several Alcohol abuse treatment center contact information given to patient.  Education and counseling provided for cutting down and eventually wean off on alcohol  Seek medical attention immediately if experiencing alcohol withdrawal symptoms  such as headache, insomnia, anxiety, tremors, shaking, nausea, vomiting

## 2020-02-11 NOTE — PROGRESS NOTES
Established Patient    Adis Chance is a 39 y.o. male who presents today with the following:    CC:   Chief Complaint   Patient presents with   • Follow-Up     lab results       HPI:     Pain of right great toe  Medial lining of the great toenail pain with hyperpigmentation. Trial of triple antibiotics cream. Referral to podiatry      Transaminitis  Given patient's drinking hx, elevated liver enzymes, and negative viral hepatitis serologies, patient most likely alcoholic related  Hx of recent alcoholic hepatitis  11/22/19 Maddrey score 2.5. patient is not indicated to use prednisolone.       Extensive education and counseling provided for alcohol cessation    Recommend patient to go to AA,  contact information of several Alcohol abuse treatment centers given to patient.    Alcohol abstinence   Prevention and treatment of alcohol withdrawal  Fluid management  Nutritional support  Infection surveillance  Prophylaxis against gastric mucosal bleeding     Ref. Range 11/11/2019 10:27 11/22/2019 15:17 2/6/2020 09:45   Sodium Latest Ref Range: 135 - 145 mmol/L 141 140 134 (L)   Potassium Latest Ref Range: 3.6 - 5.5 mmol/L 3.6 3.4 (L) 3.7   Chloride Latest Ref Range: 96 - 112 mmol/L 104 104 99   Co2 Latest Ref Range: 20 - 33 mmol/L 25 22 24   Anion Gap Latest Ref Range: 0.0 - 11.9  12.0 (H) 14.0 (H) 11.0   Glucose Latest Ref Range: 65 - 99 mg/dL 105 (H) 96 90   Bun Latest Ref Range: 8 - 22 mg/dL 6 (L) 5 (L) 8   Creatinine Latest Ref Range: 0.50 - 1.40 mg/dL 0.94 0.94 1.03   GFR If  Latest Ref Range: >60 mL/min/1.73 m 2 >60 >60 >60   GFR If Non  Latest Ref Range: >60 mL/min/1.73 m 2 >60 >60 >60   Calcium Latest Ref Range: 8.5 - 10.5 mg/dL 9.3 9.0 8.9   AST(SGOT) Latest Ref Range: 12 - 45 U/L 263 (H) 138 (H) 69 (H)   ALT(SGPT) Latest Ref Range: 2 - 50 U/L 119 (H) 82 (H) 44   Alkaline Phosphatase Latest Ref Range: 30 - 99 U/L 102 (H) 110 (H) 80   Total Bilirubin Latest Ref Range: 0.1 - 1.5 mg/dL  1.1 1.4 1.3   Albumin Latest Ref Range: 3.2 - 4.9 g/dL 4.2 4.4 4.0   Total Protein Latest Ref Range: 6.0 - 8.2 g/dL 7.1 7.3 7.1   Globulin Latest Ref Range: 1.9 - 3.5 g/dL 2.9 2.9 3.1   A-G Ratio Latest Units: g/dL 1.4 1.5 1.3      Ref. Range 11/15/2019 15:51   Hepatitis A Virus Ab, IgM Latest Ref Range: Negative  Negative   Hep A Virus Antibody Total Latest Ref Range: Negative  Negative   Hep B Surface Antibody Quant Latest Ref Range: 0.00 - 10.00 mIU/mL 3.28   Hepatitis B Surface Antigen Latest Ref Range: Negative  Negative   Hepatitis B Cors Ab,IgM Latest Ref Range: Negative  Negative   Hepatitis Be Antigen Latest Ref Range: Negative  Negative   Hepatitis BE Antibody Latest Ref Range: Negative  Negative         Vision changes  Patient noticed intermittent blurred vision, that now improved. Referral to ophthalmology for evaluation.       Tourette disorder  Chronic hx of Tourette disorder, has been referred to psychiatry previously however, patient did not follow up. Patient will call psychiatry.  Currently on clonidine 0.3 mg TID.       Hypothyroidism  Intermittent Fatigue. Thyroid function improving  Patient is not compliant with synthroid, he states he was only take 50% of the time, he is more compliant since 11/11/19. Compliance counseling provided.   10/16/2019 TSH 10.82H, FT 0.52     Ref. Range 2/6/2020 09:45   TSH Latest Ref Range: 0.380 - 5.330 uIU/mL 6.520 (H)   Free T-4 Latest Ref Range: 0.53 - 1.43 ng/dL 0.82      Ref. Range 6/17/2019 16:03   Microsomal -Tpo- Abs Latest Ref Range: 0.0 - 9.0 IU/mL 228.2 (H)     Will increase synthroid to 75 MCG daily  Will monitor thyroid function      Alcohol use disorder, moderate, dependence (HCC)  Hx of alcohol use, a couple of drinks per day. Recommend patient to go to , several Alcohol abuse treatment center contact information given to patient.  Education and counseling provided for cutting down and eventually wean off on alcohol  Seek medical attention  "immediately if experiencing alcohol withdrawal symptoms  such as headache, insomnia, anxiety, tremors, shaking, nausea, vomiting          Current Outpatient Medications   Medication Sig Dispense Refill   • famotidine (PEPCID) 40 MG Tab Take 40 mg by mouth every bedtime.     • pantoprazole (PROTONIX) 40 MG Tablet Delayed Response Take 40 mg by mouth every day.     • levothyroxine (SYNTHROID) 75 MCG Tab Take 1 Tab by mouth Every morning on an empty stomach. 90 Tab 0   • cloNIDine (CATAPRESS) 0.3 MG Tab Take 1 Tab by mouth 3 times a day. 270 Tab 1   • ondansetron (ZOFRAN) 4 MG Tab tablet Take 4 mg by mouth every four hours as needed.     • ferrous sulfate 325 (65 Fe) MG tablet TAKE 1 TABLET BY MOUTH TWICE A DAY 60 Tab 2     No current facility-administered medications for this visit.        Allergies, past medical history, past surgical history, medications, family history, social history reviewed and updated.    ROS   Constitutional: Denies fevers or chills  Eyes: previously intermittent blurry vision, now vision ok per patient  Ears/Nose/Throat/Mouth: Denies nasal congestion or sore throat   Cardiovascular: Denies chest pain or palpitations   Respiratory: Denies shortness of breath , Denies cough  Gastrointestinal/Hepatic: Denies abd pain, nausea, vomiting   Genitourinary: Denies dysuria or frequency  Musculoskeletal/Rheum: right medial great toe pain. Denies joint pain and swelling   Neurological: Denies headache  Psychiatric: OCD, tourette Denies mood disorder   Endocrine: intermittent fatigue Denies hx of diabetes   Heme/Oncology/Lymph Nodes: Denies weight changes or enlarged LNs.    Physical Exam  Vitals: /82 (BP Location: Left arm, Patient Position: Sitting, BP Cuff Size: Adult)   Pulse 81   Temp 37.1 °C (98.7 °F) (Temporal)   Resp 14   Ht 1.778 m (5' 10\")   Wt 80.7 kg (178 lb)   SpO2 96%   BMI 25.54 kg/m²   General: Alert, pleasant, NAD  HEENT: Normocephalic.  EOMI, no icterus or pallor.  " Conjunctivae and lids normal. External ears normal. Oropharynx non-erythematous, mucous membranes moist.  Neck supple.  No thyromegaly or masses palpated.   Lymph: No cervical or supraclavicular lymphadenopathy.  Cardiovascular: Regular rate and rhythm.    Respiratory: Normal respiratory effort.  Clear to auscultation bilaterally.  Abdomen: Non-distended, soft  Skin: Warm, dry, hyperpigmentation of right medial great toe  Musculoskeletal: Gait is normal.  Moves all extremities well.  Extremities: No leg edema.  radial pulses 2+ symmetric.   Psych:  Affect/mood is normal, judgement is good, memory is intact, grooming is appropriate.      Labs (2/6/2020) were reviewed and discussed with patients.  All questions were answered.      Assessment and Plan    1. Tourette disorder  - cloNIDine (CATAPRESS) 0.3 MG Tab; Take 1 Tab by mouth 3 times a day.  Dispense: 270 Tab; Refill: 1    2. Vision changes  - REFERRAL TO OPHTHALMOLOGY    3. Pain of right great toe  Trial triple antibiotic cream  - REFERRAL TO PODIATRY    4. Acquired hypothyroidism  - TSH WITH REFLEX TO FT4; Future  - levothyroxine (SYNTHROID) 75 MCG Tab; Take 1 Tab by mouth Every morning on an empty stomach.  Dispense: 90 Tab; Refill: 0    5. Transaminitis  - CBC WITH DIFFERENTIAL; Future  - Comp Metabolic Panel; Future  Alcohol cessation    6. History of iron deficiency anemia  - FERRITIN; Future    7. Alcohol use disorder, moderate, dependence (HCC)  Alcohol cessation    8. Need for vaccination  - Hepatitis B Vaccine Adult IM      Follow-up:Return in about 3 months (around 5/11/2020), or if symptoms worsen or fail to improve.    This note was created using voice recognition software. There may be unintended errors in spelling, grammar or content.

## 2020-02-11 NOTE — ASSESSMENT & PLAN NOTE
Patient noticed intermittent blurred vision, that now improved. Referral to ophthalmology for evaluation.

## 2020-02-11 NOTE — ASSESSMENT & PLAN NOTE
Given patient's drinking hx, elevated liver enzymes, and negative viral hepatitis serologies, patient most likely alcoholic related  Hx of recent alcoholic hepatitis  11/22/19 Maddrey score 2.5. patient is not indicated to use prednisolone.       Extensive education and counseling provided for alcohol cessation    Recommend patient to go to AA,  contact information of several Alcohol abuse treatment centers given to patient.    Alcohol abstinence   Prevention and treatment of alcohol withdrawal  Fluid management  Nutritional support  Infection surveillance  Prophylaxis against gastric mucosal bleeding     Ref. Range 11/11/2019 10:27 11/22/2019 15:17 2/6/2020 09:45   Sodium Latest Ref Range: 135 - 145 mmol/L 141 140 134 (L)   Potassium Latest Ref Range: 3.6 - 5.5 mmol/L 3.6 3.4 (L) 3.7   Chloride Latest Ref Range: 96 - 112 mmol/L 104 104 99   Co2 Latest Ref Range: 20 - 33 mmol/L 25 22 24   Anion Gap Latest Ref Range: 0.0 - 11.9  12.0 (H) 14.0 (H) 11.0   Glucose Latest Ref Range: 65 - 99 mg/dL 105 (H) 96 90   Bun Latest Ref Range: 8 - 22 mg/dL 6 (L) 5 (L) 8   Creatinine Latest Ref Range: 0.50 - 1.40 mg/dL 0.94 0.94 1.03   GFR If  Latest Ref Range: >60 mL/min/1.73 m 2 >60 >60 >60   GFR If Non  Latest Ref Range: >60 mL/min/1.73 m 2 >60 >60 >60   Calcium Latest Ref Range: 8.5 - 10.5 mg/dL 9.3 9.0 8.9   AST(SGOT) Latest Ref Range: 12 - 45 U/L 263 (H) 138 (H) 69 (H)   ALT(SGPT) Latest Ref Range: 2 - 50 U/L 119 (H) 82 (H) 44   Alkaline Phosphatase Latest Ref Range: 30 - 99 U/L 102 (H) 110 (H) 80   Total Bilirubin Latest Ref Range: 0.1 - 1.5 mg/dL 1.1 1.4 1.3   Albumin Latest Ref Range: 3.2 - 4.9 g/dL 4.2 4.4 4.0   Total Protein Latest Ref Range: 6.0 - 8.2 g/dL 7.1 7.3 7.1   Globulin Latest Ref Range: 1.9 - 3.5 g/dL 2.9 2.9 3.1   A-G Ratio Latest Units: g/dL 1.4 1.5 1.3      Ref. Range 11/15/2019 15:51   Hepatitis A Virus Ab, IgM Latest Ref Range: Negative  Negative   Hep A Virus Antibody  Total Latest Ref Range: Negative  Negative   Hep B Surface Antibody Quant Latest Ref Range: 0.00 - 10.00 mIU/mL 3.28   Hepatitis B Surface Antigen Latest Ref Range: Negative  Negative   Hepatitis B Cors Ab,IgM Latest Ref Range: Negative  Negative   Hepatitis Be Antigen Latest Ref Range: Negative  Negative   Hepatitis BE Antibody Latest Ref Range: Negative  Negative

## 2020-02-11 NOTE — ASSESSMENT & PLAN NOTE
Medial lining of the great toenail pain with hyperpigmentation. Trial of triple antibiotics cream. Referral to podiatry

## 2020-02-11 NOTE — ASSESSMENT & PLAN NOTE
Intermittent Fatigue. Thyroid function improving  Patient is not compliant with synthroid, he states he was only take 50% of the time, he is more compliant since 11/11/19. Compliance counseling provided.   10/16/2019 TSH 10.82H, FT 0.52     Ref. Range 2/6/2020 09:45   TSH Latest Ref Range: 0.380 - 5.330 uIU/mL 6.520 (H)   Free T-4 Latest Ref Range: 0.53 - 1.43 ng/dL 0.82      Ref. Range 6/17/2019 16:03   Microsomal -Tpo- Abs Latest Ref Range: 0.0 - 9.0 IU/mL 228.2 (H)     Will increase synthroid to 75 MCG daily  Will monitor thyroid function

## 2020-02-12 RX ORDER — FERROUS SULFATE 325(65) MG
TABLET ORAL
Qty: 60 TAB | Refills: 2 | Status: SHIPPED | OUTPATIENT
Start: 2020-02-12 | End: 2020-05-04

## 2020-03-17 ENCOUNTER — HOSPITAL ENCOUNTER (OUTPATIENT)
Facility: MEDICAL CENTER | Age: 40
End: 2020-03-17
Attending: PHYSICIAN ASSISTANT
Payer: COMMERCIAL

## 2020-03-17 ENCOUNTER — OFFICE VISIT (OUTPATIENT)
Dept: URGENT CARE | Facility: CLINIC | Age: 40
End: 2020-03-17
Payer: COMMERCIAL

## 2020-03-17 VITALS
WEIGHT: 178 LBS | HEART RATE: 64 BPM | OXYGEN SATURATION: 99 % | TEMPERATURE: 97.4 F | BODY MASS INDEX: 25.48 KG/M2 | RESPIRATION RATE: 14 BRPM | SYSTOLIC BLOOD PRESSURE: 130 MMHG | DIASTOLIC BLOOD PRESSURE: 88 MMHG | HEIGHT: 70 IN

## 2020-03-17 DIAGNOSIS — K59.00 CONSTIPATION, UNSPECIFIED CONSTIPATION TYPE: ICD-10-CM

## 2020-03-17 DIAGNOSIS — R10.9 ABDOMINAL CRAMPING: ICD-10-CM

## 2020-03-17 LAB
ALBUMIN SERPL BCP-MCNC: 4.1 G/DL (ref 3.2–4.9)
ALBUMIN/GLOB SERPL: 1.2 G/DL
ALP SERPL-CCNC: 88 U/L (ref 30–99)
ALT SERPL-CCNC: 37 U/L (ref 2–50)
ANION GAP SERPL CALC-SCNC: 12 MMOL/L (ref 7–16)
AST SERPL-CCNC: 65 U/L (ref 12–45)
BILIRUB SERPL-MCNC: 1 MG/DL (ref 0.1–1.5)
BUN SERPL-MCNC: 7 MG/DL (ref 8–22)
CALCIUM SERPL-MCNC: 9.9 MG/DL (ref 8.5–10.5)
CHLORIDE SERPL-SCNC: 101 MMOL/L (ref 96–112)
CO2 SERPL-SCNC: 24 MMOL/L (ref 20–33)
CREAT SERPL-MCNC: 1.03 MG/DL (ref 0.5–1.4)
GLOBULIN SER CALC-MCNC: 3.5 G/DL (ref 1.9–3.5)
GLUCOSE SERPL-MCNC: 90 MG/DL (ref 65–99)
LIPASE SERPL-CCNC: 55 U/L (ref 11–82)
POTASSIUM SERPL-SCNC: 4.1 MMOL/L (ref 3.6–5.5)
PROT SERPL-MCNC: 7.6 G/DL (ref 6–8.2)
SODIUM SERPL-SCNC: 137 MMOL/L (ref 135–145)

## 2020-03-17 PROCEDURE — 80053 COMPREHEN METABOLIC PANEL: CPT

## 2020-03-17 PROCEDURE — 99214 OFFICE O/P EST MOD 30 MIN: CPT | Performed by: PHYSICIAN ASSISTANT

## 2020-03-17 PROCEDURE — 83690 ASSAY OF LIPASE: CPT

## 2020-03-17 RX ORDER — DICYCLOMINE HCL 20 MG
20 TABLET ORAL EVERY 6 HOURS
Qty: 30 TAB | Refills: 0 | Status: SHIPPED | OUTPATIENT
Start: 2020-03-17 | End: 2021-07-02

## 2020-03-17 RX ORDER — LACTULOSE 20 G/30ML
30 SOLUTION ORAL 2 TIMES DAILY
Qty: 1 BOTTLE | Refills: 0 | Status: SHIPPED | OUTPATIENT
Start: 2020-03-17 | End: 2020-03-22

## 2020-03-17 ASSESSMENT — ENCOUNTER SYMPTOMS
DIAPHORESIS: 0
VOMITING: 0
HEADACHES: 0
BLOOD IN STOOL: 0
DIARRHEA: 0
HEARTBURN: 0
WEAKNESS: 0
WEIGHT LOSS: 0
FEVER: 0
DIZZINESS: 0
NAUSEA: 0
SHORTNESS OF BREATH: 0
CONSTIPATION: 1
COUGH: 0
ABDOMINAL PAIN: 1
CHILLS: 0
PALPITATIONS: 0

## 2020-03-17 ASSESSMENT — FIBROSIS 4 INDEX: FIB4 SCORE: 1.43

## 2020-03-18 ENCOUNTER — TELEPHONE (OUTPATIENT)
Dept: URGENT CARE | Facility: CLINIC | Age: 40
End: 2020-03-18

## 2020-03-18 NOTE — PROGRESS NOTES
Subjective:      Adis Chance is a 39 y.o. male who presents with Other (abdominal cramping)            Abdominal Pain   This is a new problem. The current episode started in the past 7 days. The problem occurs constantly. The pain is located in the generalized abdominal region. The pain is moderate. Associated symptoms include constipation. Pertinent negatives include no diarrhea, fever, headaches, melena, nausea, vomiting or weight loss. Associated symptoms comments: consitpation. Nothing aggravates the pain. The pain is relieved by nothing.       Review of Systems   Constitutional: Negative for chills, diaphoresis, fever, malaise/fatigue and weight loss.   Respiratory: Negative for cough and shortness of breath.    Cardiovascular: Negative for chest pain and palpitations.   Gastrointestinal: Positive for abdominal pain and constipation. Negative for blood in stool, diarrhea, heartburn, melena, nausea and vomiting.   Skin: Negative for itching and rash.   Neurological: Negative for dizziness, weakness and headaches.   All other systems reviewed and are negative.    PMH:  has a past medical history of GERD (gastroesophageal reflux disease), IBS (irritable bowel syndrome), OCD (obsessive compulsive disorder), Thyroid disease, and Tourette's.  MEDS:   Current Outpatient Medications:   •  lactulose 20 GM/30ML Solution, Take 30 mL by mouth 2 Times a Day for 10 doses. Take 1-2 doses per day.  Max 60 mL/day.  Response time may take 24-48 hrs., Disp: 1 Bottle, Rfl: 0  •  pantoprazole (PROTONIX) 40 MG Tablet Delayed Response, Take 40 mg by mouth every day., Disp: , Rfl:   •  levothyroxine (SYNTHROID) 75 MCG Tab, Take 1 Tab by mouth Every morning on an empty stomach., Disp: 90 Tab, Rfl: 0  •  cloNIDine (CATAPRESS) 0.3 MG Tab, Take 1 Tab by mouth 3 times a day., Disp: 270 Tab, Rfl: 1  •  ondansetron (ZOFRAN) 4 MG Tab tablet, Take 4 mg by mouth every four hours as needed., Disp: , Rfl:   •  ferrous sulfate 325 (65 Fe) MG  "tablet, TAKE 1 TABLET BY MOUTH TWICE A DAY, Disp: 60 Tab, Rfl: 2  •  famotidine (PEPCID) 40 MG Tab, Take 40 mg by mouth every bedtime., Disp: , Rfl:   ALLERGIES: No Known Allergies  SURGHX:   Past Surgical History:   Procedure Laterality Date   • APPENDECTOMY  2018     SOCHX:  reports that he has never smoked. He has never used smokeless tobacco. He reports current alcohol use. He reports that he does not use drugs.  FH: Family history was reviewed, no pertinent findings to report  Medications, Allergies, and current problem list reviewed today in Epic       Objective:     Blood Pressure 130/88 (BP Location: Right arm, Patient Position: Sitting)   Pulse 64   Temperature 36.3 °C (97.4 °F)   Respiration 14   Height 1.778 m (5' 10\")   Weight 80.7 kg (178 lb)   Oxygen Saturation 99%   Body Mass Index 25.54 kg/m²      Physical Exam  Vitals signs reviewed.   Constitutional:       General: He is not in acute distress.     Appearance: Normal appearance. He is well-developed. He is not ill-appearing, toxic-appearing or diaphoretic.   HENT:      Head: Normocephalic and atraumatic.      Right Ear: External ear normal.      Left Ear: External ear normal.      Nose: Nose normal.   Eyes:      General: Lids are normal.      Conjunctiva/sclera: Conjunctivae normal.   Neck:      Musculoskeletal: Full passive range of motion without pain, normal range of motion and neck supple.   Cardiovascular:      Rate and Rhythm: Normal rate and regular rhythm.      Heart sounds: Normal heart sounds, S1 normal and S2 normal. No murmur. No friction rub. No gallop.    Pulmonary:      Effort: Pulmonary effort is normal. No respiratory distress.      Breath sounds: Normal breath sounds. No decreased breath sounds, wheezing or rales.   Chest:      Chest wall: No tenderness.   Abdominal:      General: Bowel sounds are normal. There is no distension or abdominal bruit.      Palpations: Abdomen is soft. Abdomen is not rigid. There is no shifting " dullness, fluid wave, mass or pulsatile mass.      Tenderness: There is abdominal tenderness. There is no guarding or rebound. Negative signs include Chavarria's sign and McBurney's sign.      Hernia: No hernia is present.      Comments: Abdomen: Generalized PTP- not out of proportion.  Soft and nondistended. Normal bowel sounds. No hepatosplenomegaly or masses, or hernias. No rebound or guarding.     Musculoskeletal: Normal range of motion.         General: No tenderness or deformity.   Skin:     General: Skin is warm and dry.      Findings: No bruising, ecchymosis or erythema.   Neurological:      Mental Status: He is alert and oriented to person, place, and time.   Psychiatric:         Speech: Speech normal.         Behavior: Behavior normal.         Thought Content: Thought content normal.         Judgment: Judgment normal.                 Assessment/Plan:     Pt is a 39 yr old male who presents for evaluation of abdominal pain.  Pt states symptoms have been present for several days.  Pt denies red flags symptoms of intractable vomiting, bloody stools, severe 10/10 pain, or pregnancy.  PMH tourettes/ocd/gerd.   Vital signs within normal limits.  Gerneralized PTP. with no peritoneal signs or guarding.  No signs of retroperitoneal hemorrhage or erythematic skin.  UA is unremarkable and PREG negative.  Discussed diagnosis differential with patient.  Labs unremarkable.  Recommend supportive care at this time.  ED precautions discussed.    Diagnosis differential includes but not limited to: GI perforation, strangulated hernia, bowel obstruction, upper/lower GI bleed, AAA, Aortic dissection, mesenteric ischemia, Myocardial infarction, ectopic pregnancy, testicular/ovarian torsion, DKA, functional constipation, gastroenteritis.    RUQ: biliary (stone, cholecystitis), hepatic, renal (stone, pyelo)  LUQ: gastric (PUD, gastritis), splenic, renal  RLQ: appendix, pelvic (ovarian cyst, menstruation, fibroid)  LLQ:  diverticulitis, pelvic  Epigastric: gastric, pancreas  Suprapubic: bladder, pelvic    1. Abdominal cramping    - Comp Metabolic Panel; Future  - LIPASE; Future  - CBC WITH DIFFERENTIAL; Future  - lactulose 20 GM/30ML Solution; Take 30 mL by mouth 2 Times a Day for 10 doses. Take 1-2 doses per day.  Max 60 mL/day.  Response time may take 24-48 hrs.  Dispense: 1 Bottle; Refill: 0    2. Constipation, unspecified constipation type    - lactulose 20 GM/30ML Solution; Take 30 mL by mouth 2 Times a Day for 10 doses. Take 1-2 doses per day.  Max 60 mL/day.  Response time may take 24-48 hrs.  Dispense: 1 Bottle; Refill: 0    Differential diagnosis, natural history, supportive care discussed. Follow-up with primary care provider within 7-10 days, emergency room precautions discussed.  Patient and/or family appears understanding of information.  Handout and review of patients diagnosis and treatment was discussed extensively.

## 2020-05-04 DIAGNOSIS — D50.0 IRON DEFICIENCY ANEMIA DUE TO CHRONIC BLOOD LOSS: ICD-10-CM

## 2020-05-04 RX ORDER — FERROUS SULFATE 325(65) MG
TABLET ORAL
Qty: 60 TAB | Refills: 2 | Status: SHIPPED | OUTPATIENT
Start: 2020-05-04 | End: 2020-07-29

## 2020-08-31 DIAGNOSIS — E78.49 OTHER HYPERLIPIDEMIA: ICD-10-CM

## 2020-08-31 DIAGNOSIS — E03.9 ACQUIRED HYPOTHYROIDISM: ICD-10-CM

## 2020-08-31 RX ORDER — LEVOTHYROXINE SODIUM 0.07 MG/1
75 TABLET ORAL
Qty: 30 TAB | Refills: 0 | Status: SHIPPED | OUTPATIENT
Start: 2020-08-31 | End: 2020-09-28

## 2020-09-14 ENCOUNTER — HOSPITAL ENCOUNTER (OUTPATIENT)
Dept: LAB | Facility: MEDICAL CENTER | Age: 40
End: 2020-09-14
Attending: PHYSICIAN ASSISTANT
Payer: COMMERCIAL

## 2020-09-14 ENCOUNTER — HOSPITAL ENCOUNTER (OUTPATIENT)
Dept: LAB | Facility: MEDICAL CENTER | Age: 40
End: 2020-09-14
Attending: INTERNAL MEDICINE
Payer: COMMERCIAL

## 2020-09-14 DIAGNOSIS — E78.49 OTHER HYPERLIPIDEMIA: ICD-10-CM

## 2020-09-14 DIAGNOSIS — E03.9 ACQUIRED HYPOTHYROIDISM: ICD-10-CM

## 2020-09-14 DIAGNOSIS — R74.01 TRANSAMINITIS: ICD-10-CM

## 2020-09-14 DIAGNOSIS — Z86.2 HISTORY OF IRON DEFICIENCY ANEMIA: ICD-10-CM

## 2020-09-14 LAB
ALBUMIN SERPL BCP-MCNC: 4.3 G/DL (ref 3.2–4.9)
ALBUMIN/GLOB SERPL: 1.3 G/DL
ALP SERPL-CCNC: 168 U/L (ref 30–99)
ALT SERPL-CCNC: 70 U/L (ref 2–50)
ANION GAP SERPL CALC-SCNC: 15 MMOL/L (ref 7–16)
AST SERPL-CCNC: 114 U/L (ref 12–45)
BASOPHILS # BLD AUTO: 1.6 % (ref 0–1.8)
BASOPHILS # BLD: 0.06 K/UL (ref 0–0.12)
BILIRUB SERPL-MCNC: 0.9 MG/DL (ref 0.1–1.5)
BUN SERPL-MCNC: 4 MG/DL (ref 8–22)
CALCIUM SERPL-MCNC: 9.7 MG/DL (ref 8.5–10.5)
CHLORIDE SERPL-SCNC: 98 MMOL/L (ref 96–112)
CHOLEST SERPL-MCNC: 204 MG/DL (ref 100–199)
CO2 SERPL-SCNC: 24 MMOL/L (ref 20–33)
CREAT SERPL-MCNC: 1.1 MG/DL (ref 0.5–1.4)
CRP SERPL HS-MCNC: 0.16 MG/DL (ref 0–0.75)
EOSINOPHIL # BLD AUTO: 0.24 K/UL (ref 0–0.51)
EOSINOPHIL NFR BLD: 6.3 % (ref 0–6.9)
ERYTHROCYTE [DISTWIDTH] IN BLOOD BY AUTOMATED COUNT: 48.9 FL (ref 35.9–50)
FASTING STATUS PATIENT QL REPORTED: NORMAL
FERRITIN SERPL-MCNC: 34.8 NG/ML (ref 22–322)
GLOBULIN SER CALC-MCNC: 3.4 G/DL (ref 1.9–3.5)
GLUCOSE SERPL-MCNC: 101 MG/DL (ref 65–99)
GLUCOSE SERPL-MCNC: 99 MG/DL (ref 65–99)
HCT VFR BLD AUTO: 41.5 % (ref 42–52)
HDLC SERPL-MCNC: 97 MG/DL
HGB BLD-MCNC: 13 G/DL (ref 14–18)
IMM GRANULOCYTES # BLD AUTO: 0.01 K/UL (ref 0–0.11)
IMM GRANULOCYTES NFR BLD AUTO: 0.3 % (ref 0–0.9)
LDLC SERPL CALC-MCNC: 90 MG/DL
LYMPHOCYTES # BLD AUTO: 1.14 K/UL (ref 1–4.8)
LYMPHOCYTES NFR BLD: 29.8 % (ref 22–41)
MCH RBC QN AUTO: 29.4 PG (ref 27–33)
MCHC RBC AUTO-ENTMCNC: 31.3 G/DL (ref 33.7–35.3)
MCV RBC AUTO: 93.9 FL (ref 81.4–97.8)
MONOCYTES # BLD AUTO: 0.47 K/UL (ref 0–0.85)
MONOCYTES NFR BLD AUTO: 12.3 % (ref 0–13.4)
NEUTROPHILS # BLD AUTO: 1.9 K/UL (ref 1.82–7.42)
NEUTROPHILS NFR BLD: 49.7 % (ref 44–72)
NRBC # BLD AUTO: 0 K/UL
NRBC BLD-RTO: 0 /100 WBC
PLATELET # BLD AUTO: 259 K/UL (ref 164–446)
PMV BLD AUTO: 9.7 FL (ref 9–12.9)
POTASSIUM SERPL-SCNC: 3.8 MMOL/L (ref 3.6–5.5)
PROT SERPL-MCNC: 7.7 G/DL (ref 6–8.2)
RBC # BLD AUTO: 4.42 M/UL (ref 4.7–6.1)
SODIUM SERPL-SCNC: 137 MMOL/L (ref 135–145)
T3 SERPL-MCNC: 101 NG/DL (ref 60–181)
T4 FREE SERPL-MCNC: 0.83 NG/DL (ref 0.93–1.7)
T4 FREE SERPL-MCNC: 0.85 NG/DL (ref 0.93–1.7)
TRIGL SERPL-MCNC: 83 MG/DL (ref 0–149)
TSH SERPL DL<=0.005 MIU/L-ACNC: 6.27 UIU/ML (ref 0.38–5.33)
TSH SERPL DL<=0.005 MIU/L-ACNC: 6.39 UIU/ML (ref 0.38–5.33)
WBC # BLD AUTO: 3.8 K/UL (ref 4.8–10.8)

## 2020-09-14 PROCEDURE — 84443 ASSAY THYROID STIM HORMONE: CPT

## 2020-09-14 PROCEDURE — 80053 COMPREHEN METABOLIC PANEL: CPT

## 2020-09-14 PROCEDURE — 84439 ASSAY OF FREE THYROXINE: CPT

## 2020-09-14 PROCEDURE — 84443 ASSAY THYROID STIM HORMONE: CPT | Mod: 91

## 2020-09-14 PROCEDURE — 36415 COLL VENOUS BLD VENIPUNCTURE: CPT

## 2020-09-14 PROCEDURE — 86140 C-REACTIVE PROTEIN: CPT

## 2020-09-14 PROCEDURE — 80061 LIPID PANEL: CPT

## 2020-09-14 PROCEDURE — 84480 ASSAY TRIIODOTHYRONINE (T3): CPT

## 2020-09-14 PROCEDURE — 82728 ASSAY OF FERRITIN: CPT

## 2020-09-14 PROCEDURE — 84439 ASSAY OF FREE THYROXINE: CPT | Mod: 91

## 2020-09-14 PROCEDURE — 82947 ASSAY GLUCOSE BLOOD QUANT: CPT

## 2020-09-14 PROCEDURE — 86038 ANTINUCLEAR ANTIBODIES: CPT

## 2020-09-14 PROCEDURE — 85025 COMPLETE CBC W/AUTO DIFF WBC: CPT

## 2020-09-16 LAB — NUCLEAR IGG SER QL IA: NORMAL

## 2020-09-17 ENCOUNTER — OFFICE VISIT (OUTPATIENT)
Dept: MEDICAL GROUP | Facility: MEDICAL CENTER | Age: 40
End: 2020-09-17
Payer: COMMERCIAL

## 2020-09-17 VITALS
BODY MASS INDEX: 25.31 KG/M2 | OXYGEN SATURATION: 100 % | WEIGHT: 176.81 LBS | SYSTOLIC BLOOD PRESSURE: 116 MMHG | HEART RATE: 80 BPM | DIASTOLIC BLOOD PRESSURE: 68 MMHG | RESPIRATION RATE: 16 BRPM | TEMPERATURE: 99.5 F | HEIGHT: 70 IN

## 2020-09-17 DIAGNOSIS — M79.672 FOOT PAIN, BILATERAL: ICD-10-CM

## 2020-09-17 DIAGNOSIS — G89.29 CHRONIC NECK PAIN: ICD-10-CM

## 2020-09-17 DIAGNOSIS — D50.0 IRON DEFICIENCY ANEMIA DUE TO CHRONIC BLOOD LOSS: ICD-10-CM

## 2020-09-17 DIAGNOSIS — M54.2 CHRONIC NECK PAIN: ICD-10-CM

## 2020-09-17 DIAGNOSIS — K64.9 HEMORRHOIDS, UNSPECIFIED HEMORRHOID TYPE: ICD-10-CM

## 2020-09-17 DIAGNOSIS — G89.29 ABDOMINAL PAIN, CHRONIC, RIGHT LOWER QUADRANT: ICD-10-CM

## 2020-09-17 DIAGNOSIS — F42.9 OBSESSIVE-COMPULSIVE DISORDER, UNSPECIFIED TYPE: ICD-10-CM

## 2020-09-17 DIAGNOSIS — R10.31 ABDOMINAL PAIN, CHRONIC, RIGHT LOWER QUADRANT: ICD-10-CM

## 2020-09-17 DIAGNOSIS — N32.89 BLADDER WALL THICKENING: ICD-10-CM

## 2020-09-17 DIAGNOSIS — R74.01 TRANSAMINITIS: ICD-10-CM

## 2020-09-17 DIAGNOSIS — M79.671 FOOT PAIN, BILATERAL: ICD-10-CM

## 2020-09-17 DIAGNOSIS — K21.9 GASTROESOPHAGEAL REFLUX DISEASE, ESOPHAGITIS PRESENCE NOT SPECIFIED: ICD-10-CM

## 2020-09-17 DIAGNOSIS — F10.20 ALCOHOL USE DISORDER, MODERATE, DEPENDENCE (HCC): ICD-10-CM

## 2020-09-17 DIAGNOSIS — Z11.3 SCREEN FOR STD (SEXUALLY TRANSMITTED DISEASE): ICD-10-CM

## 2020-09-17 PROBLEM — H53.9 VISION CHANGES: Status: RESOLVED | Noted: 2020-02-11 | Resolved: 2020-09-17

## 2020-09-17 PROCEDURE — 99215 OFFICE O/P EST HI 40 MIN: CPT | Performed by: INTERNAL MEDICINE

## 2020-09-17 RX ORDER — DICYCLOMINE HYDROCHLORIDE 10 MG/1
CAPSULE ORAL
COMMUNITY
Start: 2020-07-18

## 2020-09-17 ASSESSMENT — FIBROSIS 4 INDEX: FIB4 SCORE: 2.05

## 2020-09-17 NOTE — ASSESSMENT & PLAN NOTE
Chronic lower abdominal pain intermittent for one year  Sharp, stabbing, intermittent  Had nausea, vomiting, diarrhea last weekend, that now improved. However, the chronic abdominal persists.    dicyclomine by GI  Worse with cough, Carnett sign positive, possible abdominal wall pain as well.   Will check CT to rule out intraabdominal process.   Following with GI

## 2020-09-18 NOTE — PROGRESS NOTES
Established Patient    Adis Chance is a 40 y.o. male who presents today with the following:    CC:   Chief Complaint   Patient presents with   • Follow-Up     Labs   • Abdominal Pain     RLQ, x 1 year       HPI:     Abdominal pain, chronic, right lower quadrant  Chronic lower abdominal pain intermittent for one year  Sharp, stabbing, intermittent  Had nausea, vomiting, diarrhea last weekend, that now improved. However, the chronic abdominal persists.    dicyclomine by GI  Worse with cough, Carnett sign positive, possible abdominal wall pain as well.   Will check CT to rule out intraabdominal process.   Following with GI        Foot pain, bilateral  Chronic bilateral foot pain for 1 year       Iron deficiency anemia due to chronic blood loss  Hx of intermittent rectal bleeding from hemorrhoid. Following with GI on PPI and famotidine. Following with surgery.  No rectal bleeding today.     4/11/2018 EGD normal esophagus.  Normal mucosa in the second part of duodenum in duodenal bulb.  Erythema in the stomach antrum and stomach body.  Colonoscopy: External hemorrhoids.  Grade 4 internal hemorrhoids.  Normal mucosa in the whole colon.     Ref. Range 10/3/2017 14:42 6/7/2019 15:05 11/11/2019 10:27 11/22/2019 15:17 9/14/2020 10:54   Hemoglobin Latest Ref Range: 14.0 - 18.0 g/dL 9.4 (L) 11.5 (L) 14.0 14.7 13.0 (L)   Hematocrit Latest Ref Range: 42.0 - 52.0 % 33.5 (L) 38.7 (L) 41.9 (L) 43.2 41.5 (L)   MCV Latest Ref Range: 81.4 - 97.8 fL 74.4 (L) 86.6 103.7 (H) 99.8 (H) 93.9      Ref. Range 6/7/2019 15:05 11/11/2019 10:27 9/14/2020 10:54   Ferritin Latest Ref Range: 22.0 - 322.0 ng/mL 10.7 (L) 51.4 34.8     Chronic hx of iron deficiency anemia due to hemorrhoids. Every other month he would have exacerbation of symptoms. He was taking 2 tablet of iron supplement daily.   Recommend patient not to overstrain when having BM. However, his OCD is making him hard to stop keep pushing even after all of BM has been passed.Pending  appointment with behavioral medicine to OCD.   Recommend patient follow up with GI Dr. Starks and general surgery  Stop alcohol use.   PO iron with Vitamin C daily  Seek medical attention immediately for rectal bleeding.         Alcohol use disorder, moderate, dependence (HCC)  Hx of alcohol use, a couple of drinks per day. Recommend patient to go to , several Alcohol abuse treatment center contact information given to patient.  Education and counseling provided for cutting down and eventually wean off on alcohol  Seek medical attention immediately if experiencing alcohol withdrawal symptoms  such as headache, insomnia, anxiety, tremors, shaking, nausea, vomiting        Transaminitis  Given patient's drinking hx, elevated liver enzymes, and negative viral hepatitis serologies, patient most likely alcoholic related  Hx of recent alcoholic hepatitis  11/22/19 Maddrey score 2.5. patient is not indicated to use prednisolone.       Extensive education and counseling provided for alcohol cessation    Recommend patient to go to ,  contact information of several Alcohol abuse treatment centers given to patient.    Alcohol abstinence   Prevention and treatment of alcohol withdrawal  Fluid management  Nutritional support  Infection surveillance  Prophylaxis against gastric mucosal bleeding     Ref. Range 11/11/2019 10:27 11/22/2019 15:17 2/6/2020 09:45 3/17/2020 17:49 9/14/2020 10:54   AST(SGOT) Latest Ref Range: 12 - 45 U/L 263 (H) 138 (H) 69 (H) 65 (H) 114 (H)   ALT(SGPT) Latest Ref Range: 2 - 50 U/L 119 (H) 82 (H) 44 37 70 (H)   Alkaline Phosphatase Latest Ref Range: 30 - 99 U/L 102 (H) 110 (H) 80 88 168 (H)   Total Bilirubin Latest Ref Range: 0.1 - 1.5 mg/dL 1.1 1.4 1.3 1.0 0.9   Albumin Latest Ref Range: 3.2 - 4.9 g/dL 4.2 4.4 4.0 4.1 4.3      Ref. Range 11/15/2019 15:51   Hepatitis A Virus Ab, IgM Latest Ref Range: Negative  Negative   Hep A Virus Antibody Total Latest Ref Range: Negative  Negative   Hep B Surface  Antibody Quant Latest Ref Range: 0.00 - 10.00 mIU/mL 3.28   Hepatitis B Surface Antigen Latest Ref Range: Negative  Negative   Hepatitis B Cors Ab,IgM Latest Ref Range: Negative  Negative   Hepatitis Be Antigen Latest Ref Range: Negative  Negative   Hepatitis BE Antibody Latest Ref Range: Negative  Negative         Hemorrhoids  Hx of intermittent rectal bleeding from hemorrhoid.   Currently no overt bleeding.    Hydration, high fiber, squatty potty      4/11/2018 EGD normal esophagus.  Normal mucosa in the second part of duodenum in duodenal bulb.  Erythema in the stomach antrum and stomach body.  Colonoscopy: External hemorrhoids.  Grade 4 internal hemorrhoids.  Normal mucosa in the whole colon.    Follow with GI and surgery        Bladder wall thickening  Hx of diffuse bladder wall thickening on CT scan  Will check Urinalysis        Current Outpatient Medications   Medication Sig Dispense Refill   • cimetidine (TAGAMET) 200 MG tablet TAKE 1 TABLET BY MOUTH AT BEDTIME FOR HEARTBURN FOR 30 DAYS *OTC NOT COV D*     • dicyclomine (BENTYL) 10 MG Cap TAKE 1 TABLET BY MOUTH EVERY 6 HOURS AS NEEDED FOR ABDOMINAL CRAMPS AND/OR DISCOMFORT     • levothyroxine (SYNTHROID) 75 MCG Tab Take 1 Tab by mouth Every morning on an empty stomach. 30 Tab 0   • ferrous sulfate 325 (65 Fe) MG tablet Take 1 Tab by mouth every day. 90 Tab 0   • pantoprazole (PROTONIX) 40 MG Tablet Delayed Response Take 40 mg by mouth every day.     • cloNIDine (CATAPRESS) 0.3 MG Tab Take 1 Tab by mouth 3 times a day. 270 Tab 1   • ondansetron (ZOFRAN) 4 MG Tab tablet Take 4 mg by mouth every four hours as needed.     • dicyclomine (BENTYL) 20 MG Tab Take 1 Tab by mouth every 6 hours. (Patient not taking: Reported on 9/17/2020) 30 Tab 0     No current facility-administered medications for this visit.        Allergies, past medical history, past surgical history, medications, family history, social history reviewed and updated.    ROS   Constitutional: Denies  "fevers or chills  Eyes: Denies changes in vision  Ears/Nose/Throat/Mouth: Denies nasal congestion or sore throat   Cardiovascular: Denies chest pain or palpitations   Respiratory: Denies shortness of breath , Denies cough  Gastrointestinal/Hepatic: Per HPI  Genitourinary: Denies dysuria or frequency  Musculoskeletal/Rheum: Denies joint pain and swelling   Neurological: Denies headache  Psychiatric: Denies mood disorder   Endocrine: Denies hx of diabetes or thyroid dysfunction  Heme/Oncology/Lymph Nodes: Denies weight changes or enlarged LNs.    Physical Exam  Vitals: /68 (BP Location: Left arm, Patient Position: Sitting, BP Cuff Size: Adult)   Pulse 80   Temp 37.5 °C (99.5 °F) (Temporal)   Resp 16   Ht 1.778 m (5' 10\")   Wt 80.2 kg (176 lb 12.9 oz)   SpO2 100%   BMI 25.37 kg/m²   General: Alert, pleasant, NAD  HEENT: Normocephalic.  EOMI, no icterus or pallor.  Conjunctivae and lids normal. External ears normal. Oropharynx non-erythematous, mucous membranes moist.  Neck supple.  No thyromegaly or masses palpated.   Lymph: No cervical or supraclavicular lymphadenopathy.  Cardiovascular: Regular rate and rhythm.  S1 and S2 normal.  No murmurs appreciated.  Respiratory: Normal respiratory effort.  Clear to auscultation bilaterally.  Abdomen: Non-distended, soft, RLQ abdominal tenderness. No rebound. No guarding.  Skin: Warm, dry, groin scaly dry patches  Musculoskeletal: Gait is normal.  Moves all extremities well.  Extremities: No leg edema.  Pedal pulses 2+ symmetric.   Psych:  Affect/mood is normal, judgement is good, memory is intact, grooming is appropriate.      Labs (9/14/2020) were reviewed and discussed with patients.  All questions were answered.      Assessment and Plan    1. Abdominal pain, chronic, right lower quadrant  - dicyclomine per GI  - treat his constipation  - CT-ABDOMEN-PELVIS WITH; Future to rule out intraabdominal process  - possible abdominal wall component on top of this.     2. " Hemorrhoids, unspecified hemorrhoid type  - following with GI and surgery  Lifestyle modifications    3. Gastroesophageal reflux disease, esophagitis presence not specified  - cimetidine and PPI per GI    4. Transaminitis  Likely alcohol related.  Education and counseling provided for cutting down on alcohol and eventually quit    5. Alcohol use disorder, moderate, dependence (HCC)  Education and counseling provided for cutting down on alcohol and eventually quit  - REFERRAL TO PSYCHIATRY    6. Obsessive-compulsive disorder, unspecified type  - REFERRAL TO PSYCHIATRY    7. Iron deficiency anemia due to chronic blood loss  On iron with vit C  Following with GI and surgery for his large hemorrhoid    8. Bladder wall thickening  - URINALYSIS,CULTURE IF INDICATED; Future    9. Chronic neck pain  - DX-CERVICAL SPINE-4+ VIEWS; Future    10. Screen for STD (sexually transmitted disease)  - Chlamydia/GC PCR Urine Or Swab; Future  - HEP B SURFACE AB; Future  - HEP B SURFACE ANTIGEN; Future  - HEP C VIRUS ANTIBODY; Future  - HIV AG/AB COMBO ASSAY SCREENING; Future  - T.PALLIDUM AB EIA; Future    11. Foot pain, bilateral  - REFERRAL TO PODIATRY  Check uric acid    For his groin rash, recommend to use OTC antifungal cream.     Patient was seen for 40 minutes face to face of which > 50% of appointment time was spent on counseling and coordination of care regarding the above.    Follow-up:Return if symptoms worsen or fail to improve.    This note was created using voice recognition software. There may be unintended errors in spelling, grammar or content.

## 2020-09-18 NOTE — ASSESSMENT & PLAN NOTE
Hx of intermittent rectal bleeding from hemorrhoid.   Currently no overt bleeding.    Hydration, high fiber, squatty potty      4/11/2018 EGD normal esophagus.  Normal mucosa in the second part of duodenum in duodenal bulb.  Erythema in the stomach antrum and stomach body.  Colonoscopy: External hemorrhoids.  Grade 4 internal hemorrhoids.  Normal mucosa in the whole colon.    Follow with GI and surgery

## 2020-09-18 NOTE — ASSESSMENT & PLAN NOTE
Hx of intermittent rectal bleeding from hemorrhoid. Following with GI on PPI and famotidine. Following with surgery.  No rectal bleeding today.     4/11/2018 EGD normal esophagus.  Normal mucosa in the second part of duodenum in duodenal bulb.  Erythema in the stomach antrum and stomach body.  Colonoscopy: External hemorrhoids.  Grade 4 internal hemorrhoids.  Normal mucosa in the whole colon.     Ref. Range 10/3/2017 14:42 6/7/2019 15:05 11/11/2019 10:27 11/22/2019 15:17 9/14/2020 10:54   Hemoglobin Latest Ref Range: 14.0 - 18.0 g/dL 9.4 (L) 11.5 (L) 14.0 14.7 13.0 (L)   Hematocrit Latest Ref Range: 42.0 - 52.0 % 33.5 (L) 38.7 (L) 41.9 (L) 43.2 41.5 (L)   MCV Latest Ref Range: 81.4 - 97.8 fL 74.4 (L) 86.6 103.7 (H) 99.8 (H) 93.9      Ref. Range 6/7/2019 15:05 11/11/2019 10:27 9/14/2020 10:54   Ferritin Latest Ref Range: 22.0 - 322.0 ng/mL 10.7 (L) 51.4 34.8     Chronic hx of iron deficiency anemia due to hemorrhoids. Every other month he would have exacerbation of symptoms. He was taking 2 tablet of iron supplement daily.   Recommend patient not to overstrain when having BM. However, his OCD is making him hard to stop keep pushing even after all of BM has been passed.Pending appointment with behavioral medicine to OCD.   Recommend patient follow up with GI Dr. Starks and general surgery  Stop alcohol use.   PO iron with Vitamin C daily  Seek medical attention immediately for rectal bleeding.

## 2020-09-18 NOTE — ASSESSMENT & PLAN NOTE
Given patient's drinking hx, elevated liver enzymes, and negative viral hepatitis serologies, patient most likely alcoholic related  Hx of recent alcoholic hepatitis  11/22/19 Maddrey score 2.5. patient is not indicated to use prednisolone.       Extensive education and counseling provided for alcohol cessation    Recommend patient to go to AA,  contact information of several Alcohol abuse treatment centers given to patient.    Alcohol abstinence   Prevention and treatment of alcohol withdrawal  Fluid management  Nutritional support  Infection surveillance  Prophylaxis against gastric mucosal bleeding     Ref. Range 11/11/2019 10:27 11/22/2019 15:17 2/6/2020 09:45 3/17/2020 17:49 9/14/2020 10:54   AST(SGOT) Latest Ref Range: 12 - 45 U/L 263 (H) 138 (H) 69 (H) 65 (H) 114 (H)   ALT(SGPT) Latest Ref Range: 2 - 50 U/L 119 (H) 82 (H) 44 37 70 (H)   Alkaline Phosphatase Latest Ref Range: 30 - 99 U/L 102 (H) 110 (H) 80 88 168 (H)   Total Bilirubin Latest Ref Range: 0.1 - 1.5 mg/dL 1.1 1.4 1.3 1.0 0.9   Albumin Latest Ref Range: 3.2 - 4.9 g/dL 4.2 4.4 4.0 4.1 4.3      Ref. Range 11/15/2019 15:51   Hepatitis A Virus Ab, IgM Latest Ref Range: Negative  Negative   Hep A Virus Antibody Total Latest Ref Range: Negative  Negative   Hep B Surface Antibody Quant Latest Ref Range: 0.00 - 10.00 mIU/mL 3.28   Hepatitis B Surface Antigen Latest Ref Range: Negative  Negative   Hepatitis B Cors Ab,IgM Latest Ref Range: Negative  Negative   Hepatitis Be Antigen Latest Ref Range: Negative  Negative   Hepatitis BE Antibody Latest Ref Range: Negative  Negative

## 2020-12-03 DIAGNOSIS — F95.2 TOURETTE DISORDER: ICD-10-CM

## 2020-12-03 RX ORDER — CLONIDINE HYDROCHLORIDE 0.3 MG/1
TABLET ORAL
Qty: 90 TAB | Refills: 0 | Status: SHIPPED | OUTPATIENT
Start: 2020-12-03 | End: 2021-01-04

## 2021-01-03 DIAGNOSIS — F95.2 TOURETTE DISORDER: ICD-10-CM

## 2021-01-04 RX ORDER — CLONIDINE HYDROCHLORIDE 0.3 MG/1
TABLET ORAL
Qty: 90 TAB | Refills: 0 | Status: SHIPPED | OUTPATIENT
Start: 2021-01-04 | End: 2021-02-01

## 2021-01-21 ENCOUNTER — APPOINTMENT (OUTPATIENT)
Dept: RADIOLOGY | Facility: IMAGING CENTER | Age: 41
End: 2021-01-21
Attending: PHYSICIAN ASSISTANT
Payer: COMMERCIAL

## 2021-01-21 ENCOUNTER — OFFICE VISIT (OUTPATIENT)
Dept: URGENT CARE | Facility: CLINIC | Age: 41
End: 2021-01-21
Payer: COMMERCIAL

## 2021-01-21 VITALS
BODY MASS INDEX: 27.49 KG/M2 | HEART RATE: 94 BPM | TEMPERATURE: 98 F | HEIGHT: 70 IN | SYSTOLIC BLOOD PRESSURE: 148 MMHG | DIASTOLIC BLOOD PRESSURE: 102 MMHG | RESPIRATION RATE: 16 BRPM | WEIGHT: 192 LBS | OXYGEN SATURATION: 98 %

## 2021-01-21 DIAGNOSIS — R07.81 RIB PAIN ON RIGHT SIDE: ICD-10-CM

## 2021-01-21 DIAGNOSIS — J22 LRTI (LOWER RESPIRATORY TRACT INFECTION): ICD-10-CM

## 2021-01-21 PROCEDURE — 71046 X-RAY EXAM CHEST 2 VIEWS: CPT | Mod: TC | Performed by: PHYSICIAN ASSISTANT

## 2021-01-21 PROCEDURE — 71100 X-RAY EXAM RIBS UNI 2 VIEWS: CPT | Mod: TC,RT | Performed by: PHYSICIAN ASSISTANT

## 2021-01-21 PROCEDURE — 99213 OFFICE O/P EST LOW 20 MIN: CPT | Performed by: PHYSICIAN ASSISTANT

## 2021-01-21 RX ORDER — AZITHROMYCIN 250 MG/1
TABLET, FILM COATED ORAL
Qty: 6 TAB | Refills: 0 | Status: SHIPPED | OUTPATIENT
Start: 2021-01-21 | End: 2021-07-02

## 2021-01-21 ASSESSMENT — ENCOUNTER SYMPTOMS
COUGH: 1
PALPITATIONS: 0
SINUS PAIN: 0
NAUSEA: 0
SORE THROAT: 0
SHORTNESS OF BREATH: 0
DIAPHORESIS: 0
MYALGIAS: 0
WHEEZING: 0
ABDOMINAL PAIN: 0
FEVER: 0
HEADACHES: 0
SPUTUM PRODUCTION: 0
CHILLS: 0
VOMITING: 0
DOUBLE VISION: 0
BLURRED VISION: 0
DIZZINESS: 0

## 2021-01-21 ASSESSMENT — FIBROSIS 4 INDEX: FIB4 SCORE: 2.05

## 2021-01-22 NOTE — PROGRESS NOTES
Subjective:   Adis Chance is a 40 y.o. male who presents for Cough (rib pain)      HPI:  This is a very pleasant 40-year-old male presenting to the clinic with right-sided rib pain x1 week.  Patient has a history of Tourette's.  He states his tic is to frequently cough.  He believes he may have injured a rib while doing so.  He informs me he has done this in the past. He has had a progressively worsening cough with yellow sputum production over the last 3 weeks. He is requesting to have an x-ray performed.  States the pain while resting is fairly mild.  When deep breathing or coughing his pain is a 6/10.  He denies any chest pain or chest pressure.   Denies any fevers, chills or myalgias.  Denies any direct trauma to the ribs.  He denies any lower extremity edema or history of clotting.  He has no known ill contacts.    Review of Systems   Constitutional: Negative for chills, diaphoresis, fever and malaise/fatigue.   HENT: Negative for congestion, sinus pain and sore throat.    Eyes: Negative for blurred vision and double vision.   Respiratory: Positive for cough. Negative for sputum production, shortness of breath and wheezing.    Cardiovascular: Negative for chest pain, palpitations and leg swelling.   Gastrointestinal: Negative for abdominal pain, nausea and vomiting.   Musculoskeletal: Negative for myalgias.   Skin: Negative for rash.   Neurological: Negative for dizziness and headaches.   Psychiatric/Behavioral:        Has a history of Tourette's.       Medications:    • cimetidine  • cloNIDine Tabs  • dicyclomine Caps  • dicyclomine Tabs  • ferrous sulfate  • levothyroxine Tabs  • ondansetron Tabs  • pantoprazole Tbec    Allergies: Patient has no known allergies.    Problem List: Adis Chance has Tourette disorder; Irregular bowel habits; Hypothyroidism; OCD (obsessive compulsive disorder); Rectal bleeding; Chronic neck pain; Rash; Iron deficiency anemia due to chronic blood loss; Hemorrhoids; GERD  "(gastroesophageal reflux disease); Heart palpitations; Depression; Other insomnia; Anemia; Other hyperlipidemia; Other constipation; Transaminitis; Macrocytosis without anemia; Alcohol use disorder, moderate, dependence (HCC); Pain of right great toe; Abdominal pain, chronic, right lower quadrant; Foot pain, bilateral; and Bladder wall thickening on their problem list.    Surgical History:  Past Surgical History:   Procedure Laterality Date   • APPENDECTOMY  2018       Past Social Hx: Adis Chance  reports that he has never smoked. He has never used smokeless tobacco. He reports current alcohol use. He reports that he does not use drugs.     Past Family Hx:  Adis Chance family history includes Stroke in his father.     Problem list, medications, and allergies reviewed by myself today in Epic.     Objective:     BP (!) 168/110 (BP Location: Right arm, Patient Position: Sitting)   Pulse (!) 113   Temp 36.7 °C (98 °F)   Resp 16   Ht 1.778 m (5' 10\")   Wt 87.1 kg (192 lb)   SpO2 98%   BMI 27.55 kg/m²     Physical Exam  Constitutional:       General: He is not in acute distress.     Appearance: Normal appearance. He is not ill-appearing, toxic-appearing or diaphoretic.   HENT:      Head: Normocephalic and atraumatic.      Right Ear: Tympanic membrane, ear canal and external ear normal.      Left Ear: Tympanic membrane, ear canal and external ear normal.   Eyes:      Conjunctiva/sclera: Conjunctivae normal.   Neck:      Musculoskeletal: Normal range of motion. No muscular tenderness.   Cardiovascular:      Rate and Rhythm: Normal rate and regular rhythm.      Pulses: Normal pulses.      Heart sounds: Normal heart sounds.      Comments: Pectus deformity  Pulmonary:      Effort: Pulmonary effort is normal.      Breath sounds: Normal breath sounds. No wheezing, rhonchi or rales.   Chest:      Chest wall: Tenderness (Right anterior lower rib cage tenderness to palpation.  No crepitus or discoloration.) present. "   Abdominal:      General: Bowel sounds are normal.      Palpations: Abdomen is soft. There is no mass.      Tenderness: There is no abdominal tenderness. There is no right CVA tenderness, left CVA tenderness, guarding or rebound. Negative signs include Chavarria's sign.   Lymphadenopathy:      Cervical: No cervical adenopathy.   Skin:     General: Skin is warm and dry.      Capillary Refill: Capillary refill takes less than 2 seconds.   Neurological:      Mental Status: He is alert.   Psychiatric:         Mood and Affect: Mood normal.         Thought Content: Thought content normal.           Chest x-ray:  FINDINGS:  Cardiomediastinal contour is within normal limits.  No focal pulmonary consolidation.  No pleural fluid collection or pneumothorax.  Pectus deformity again noted.     IMPRESSION:  No acute cardiopulmonary disease    Unilateral rib series:  FINDINGS:  No displaced RIGHT rib fracture.  No RIGHT pleural fluid or pneumothorax.  Mild S-shaped curvature of thoracic spine.     IMPRESSION:  No acute RIGHT rib findings.    Assessment/Plan:     Diagnosis and associated orders:   1. Rib pain on right side  - [id number] (W/O CXR) RIGHT; Future  - DX-CHEST-2 VIEWS; Future    2. LRTI (lower respiratory tract infection)  - azithromycin (ZITHROMAX) 250 MG Tab; Take 2 tablets PO on day one, then 1 tablet PO on day two to five.  Dispense: 6 Tab; Refill: 0     Comments/MDM:     • Differential diagnoses and treatment options were discussed with the patient at length today.  Patient has had an ongoing cough becoming more productive of yellow sputum over the last 3 weeks.  He believes that his increased coughing fits have aggravated his right lower lower rib cage.  Radiographs demonstrate no cardiopulmonary processes.  No signs of rib fracture.  • Agreed to start the patient on azithromycin take as directed take with food.  • Encouraged ibuprofen take as directed on label.  Apply ice or cool compresses over the rib  cage.  Encourage deep breathing.  • Discussed potential red flag symptoms that would warrant emergent follow-up.  Call with any questions or concerns.           Differential diagnosis, natural history, supportive care, and indications for immediate follow-up discussed.    Advised the patient to follow-up with the primary care physician for recheck, reevaluation, and consideration of further management.    Please note that this dictation was created using voice recognition software. I have made reasonable attempt to correct obvious errors, but I expect that there are errors of grammar and possibly content that I did not discover before finalizing the note.    This note was electronically signed by RAHUL Lane PA-C

## 2021-02-01 DIAGNOSIS — F95.2 TOURETTE DISORDER: ICD-10-CM

## 2021-02-01 RX ORDER — CLONIDINE HYDROCHLORIDE 0.3 MG/1
TABLET ORAL
Qty: 90 TAB | Refills: 1 | Status: SHIPPED | OUTPATIENT
Start: 2021-02-01 | End: 2021-02-03

## 2021-02-02 DIAGNOSIS — F95.2 TOURETTE DISORDER: ICD-10-CM

## 2021-02-03 RX ORDER — CLONIDINE HYDROCHLORIDE 0.3 MG/1
TABLET ORAL
Qty: 90 TAB | Refills: 0 | Status: SHIPPED | OUTPATIENT
Start: 2021-02-03 | End: 2021-05-03

## 2021-02-17 DIAGNOSIS — E03.9 ACQUIRED HYPOTHYROIDISM: ICD-10-CM

## 2021-02-17 RX ORDER — LEVOTHYROXINE SODIUM 0.07 MG/1
TABLET ORAL
Qty: 30 TABLET | Refills: 2 | Status: SHIPPED | OUTPATIENT
Start: 2021-02-17 | End: 2021-05-24

## 2021-03-26 ENCOUNTER — APPOINTMENT (OUTPATIENT)
Dept: RADIOLOGY | Facility: IMAGING CENTER | Age: 41
End: 2021-03-26
Attending: PHYSICIAN ASSISTANT
Payer: COMMERCIAL

## 2021-03-26 ENCOUNTER — OFFICE VISIT (OUTPATIENT)
Dept: URGENT CARE | Facility: CLINIC | Age: 41
End: 2021-03-26
Payer: COMMERCIAL

## 2021-03-26 VITALS
DIASTOLIC BLOOD PRESSURE: 98 MMHG | OXYGEN SATURATION: 98 % | HEIGHT: 70 IN | BODY MASS INDEX: 27.2 KG/M2 | RESPIRATION RATE: 16 BRPM | HEART RATE: 92 BPM | SYSTOLIC BLOOD PRESSURE: 140 MMHG | WEIGHT: 190 LBS | TEMPERATURE: 98.6 F

## 2021-03-26 DIAGNOSIS — F10.20 ALCOHOL USE DISORDER, MODERATE, DEPENDENCE (HCC): ICD-10-CM

## 2021-03-26 DIAGNOSIS — S63.501A SPRAIN OF RIGHT WRIST, INITIAL ENCOUNTER: ICD-10-CM

## 2021-03-26 DIAGNOSIS — B35.6 TINEA CRURIS: ICD-10-CM

## 2021-03-26 PROCEDURE — 73130 X-RAY EXAM OF HAND: CPT | Mod: TC,RT | Performed by: PHYSICIAN ASSISTANT

## 2021-03-26 PROCEDURE — 99214 OFFICE O/P EST MOD 30 MIN: CPT | Performed by: PHYSICIAN ASSISTANT

## 2021-03-26 RX ORDER — KETOCONAZOLE 20 MG/G
1 CREAM TOPICAL DAILY
Qty: 60 G | Refills: 0 | Status: SHIPPED | OUTPATIENT
Start: 2021-03-26 | End: 2021-04-09

## 2021-03-26 RX ORDER — TRIAMCINOLONE ACETONIDE 1 MG/G
1 CREAM TOPICAL 2 TIMES DAILY
Qty: 80 G | Refills: 0 | Status: SHIPPED | OUTPATIENT
Start: 2021-03-26 | End: 2021-04-02

## 2021-03-26 ASSESSMENT — ENCOUNTER SYMPTOMS
HEADACHES: 0
COUGH: 0
DIZZINESS: 0
SHORTNESS OF BREATH: 0
FALLS: 0
MYALGIAS: 1
PALPITATIONS: 0
VOMITING: 0
NAUSEA: 0
ABDOMINAL PAIN: 0
FEVER: 0
CHILLS: 0

## 2021-03-26 ASSESSMENT — FIBROSIS 4 INDEX: FIB4 SCORE: 2.05

## 2021-03-26 NOTE — PROGRESS NOTES
Subjective:   Adis Chance is a 40 y.o. male who presents for Wrist Injury ((R) wrist x 2 days while punching a bag ; popping in thumb, if move wrist then shooting pain) and Rash (groin and thighs x 1 months; itchy, red spots )      HPI:  This is a very pleasant 40-year-old male with a past medical history significant for Tourette disorder, transaminitis, alcohol use disorder and hypothyroidism.  He is presenting to the clinic today with right wrist and hand pain x2 days.  Patient states he was punching a heavy bag when he felt a sharp sensation to the radial aspect of his right hand and wrist.  He has had continued pain for the last 2 days.  Pain is described as a constant dull achy sensation.  Certain movements such as thumb extension/abduction and wrist extension seem to aggravate the pain.  He is also tender to palpation.  He has tried ice and ibuprofen which provides mild relief.  He would also like to be evaluated today for a rash that he has had near his groin area and inner thighs for the last month.  He describes it as multiple small red spots that are very itchy.  He has been using [employer] cream that is provided moderate relief.  He denies any pain or discharge from the lesions.  Denies any fevers, chills or myalgias.  He also continues to have intermittent right side pain.  He was seen in clinic on 1/21/2021 for the same complaint.  At that time he had increased sputum production and right-sided rib pain.  He states it was tender to palpation.  X-ray was performed and showed no underlying abnormalities to the ribs or lung.  Patient is a drinker he is trying to cut back currently.  Past medical history reveals transaminitis likely from his alcohol intake.      Review of Systems   Constitutional: Negative for chills, fever and malaise/fatigue.   Respiratory: Negative for cough and shortness of breath.    Cardiovascular: Negative for chest pain and palpitations.   Gastrointestinal: Negative for abdominal  "pain, nausea and vomiting.   Genitourinary: Negative for dysuria and hematuria.   Musculoskeletal: Positive for joint pain and myalgias. Negative for falls.   Skin: Positive for itching and rash.   Neurological: Negative for dizziness and headaches.       Medications:    • azithromycin Tabs  • cimetidine  • cloNIDine Tabs  • dicyclomine Caps  • dicyclomine Tabs  • ferrous sulfate  • levothyroxine Tabs  • ondansetron Tabs  • pantoprazole Tbec    Allergies: Patient has no known allergies.    Problem List: Adis Chance has Tourette disorder; Irregular bowel habits; Hypothyroidism; OCD (obsessive compulsive disorder); Rectal bleeding; Chronic neck pain; Rash; Iron deficiency anemia due to chronic blood loss; Hemorrhoids; GERD (gastroesophageal reflux disease); Heart palpitations; Depression; Other insomnia; Anemia; Other hyperlipidemia; Other constipation; Transaminitis; Macrocytosis without anemia; Alcohol use disorder, moderate, dependence (HCC); Pain of right great toe; Abdominal pain, chronic, right lower quadrant; Foot pain, bilateral; and Bladder wall thickening on their problem list.    Surgical History:  Past Surgical History:   Procedure Laterality Date   • APPENDECTOMY  2018       Past Social Hx: Adis Chance  reports that he has never smoked. He has never used smokeless tobacco. He reports current alcohol use. He reports that he does not use drugs.     Past Family Hx:  Adis Chance family history includes Stroke in his father.     Problem list, medications, and allergies reviewed by myself today in Epic.     Objective:     /98 (BP Location: Left arm, Patient Position: Sitting)   Pulse 92   Temp 37 °C (98.6 °F) (Temporal)   Resp 16   Ht 1.778 m (5' 10\")   Wt 86.2 kg (190 lb)   SpO2 98%   BMI 27.26 kg/m²     Physical Exam  Constitutional:       General: He is not in acute distress.     Appearance: Normal appearance. He is not ill-appearing or toxic-appearing.   HENT:      Head: Normocephalic and " atraumatic.   Eyes:      Conjunctiva/sclera: Conjunctivae normal.   Cardiovascular:      Rate and Rhythm: Normal rate and regular rhythm.   Pulmonary:      Effort: Pulmonary effort is normal.      Breath sounds: Normal breath sounds. No wheezing.   Musculoskeletal:         General: Normal range of motion.      Cervical back: Normal range of motion. No rigidity.      Comments: Right hand and wrist:  No noticeable swelling or discoloration.  Patient has tenderness to the anatomical snuffbox.  No tenderness over the distal radius or ulna.  No tenderness to the metacarpals or phalanges.  Patient has full wrist flexion/extension/ulnar/radial deviation.   strength 5/5.  Sensation intact distally   Skin:     Capillary Refill: Capillary refill takes less than 2 seconds.      Findings: Rash present.      Comments: Very faint papules appreciated near the groin region.  No other noticeable rashes or lesions.  No tenderness to touch.     Neurological:      General: No focal deficit present.      Mental Status: He is alert and oriented to person, place, and time. Mental status is at baseline.       X-ray right hand:  FINDINGS:  No acute fracture or malalignment. Soft tissues are grossly unremarkable.     IMPRESSION:     No acute or subacute fracture identified. If pain persists, recommend repeat imaging in 7 days.    Assessment/Plan:     Diagnosis and associated orders:   1. Sprain of right wrist, initial encounter  - DX-HAND 3+ RIGHT; Future    2. Tinea cruris  - ketoconazole (NIZORAL) 2 % Cream; Apply 1 Application topically every day for 14 days.  Dispense: 60 g; Refill: 0  - triamcinolone acetonide (KENALOG) 0.1 % Cream; Apply 1 Application topically 2 times a day for 7 days.  Dispense: 80 g; Refill: 0    3. Alcohol use disorder, moderate, dependence (HCC)       Comments/MDM:     • Right wrist: I interpreted the x-rays in clinic and agree with the radiologist interpretation.  There are no signs of fracture or  dislocation.  This was however an acute injury and a small fracture may not be visible at this time.  We agreed to place the patient in a thumb spica splint.  If pain persist follow-up in clinic in 1 week for repeat imaging.  Alternate Tylenol and ibuprofen as needed for pain.  Ice and elevation recommended.  • Rash: Findings consistent with resolving tinea cruris.  At this time recommended starting ketoconazole cream once daily for the next 14 days.  If symptoms persist may try topical steroid cream twice daily for 7 days do not exceed 7 days of treatment with the topical steroid.  OTC antihistamines for pruritus.  • Call with any questions or concerns.  Return to the clinic for any persistence or worsening of your symptoms.  Recommended following up with PCP in regards to previous transaminitis.  Encouraged to cut back on alcohol intake.           Differential diagnosis, natural history, supportive care, and indications for immediate follow-up discussed.    Advised the patient to follow-up with the primary care physician for recheck, reevaluation, and consideration of further management.    I personally reviewed prior external notes and test results pertinent to today's visit.  I have independently reviewed and interpreted all diagnostics ordered during this urgent care acute visit. Time spent evaluating this patient was a minimum of 30 minutes and includes preparing for visit, counseling/education, exam and evaluation, obtaining history, independent interpretation and ordering lab/test/procedures.      Please note that this dictation was created using voice recognition software. I have made reasonable attempt to correct obvious errors, but I expect that there are errors of grammar and possibly content that I did not discover before finalizing the note.    This note was electronically signed by RAHUL Lane PA-C

## 2021-05-02 DIAGNOSIS — F95.2 TOURETTE DISORDER: ICD-10-CM

## 2021-05-03 RX ORDER — CLONIDINE HYDROCHLORIDE 0.3 MG/1
TABLET ORAL
Qty: 90 TABLET | Refills: 0 | Status: SHIPPED | OUTPATIENT
Start: 2021-05-03 | End: 2021-06-02

## 2021-05-22 DIAGNOSIS — E03.9 ACQUIRED HYPOTHYROIDISM: ICD-10-CM

## 2021-05-24 RX ORDER — LEVOTHYROXINE SODIUM 0.07 MG/1
TABLET ORAL
Qty: 30 TABLET | Refills: 2 | Status: SHIPPED | OUTPATIENT
Start: 2021-05-24 | End: 2021-07-02 | Stop reason: SDUPTHER

## 2021-06-01 DIAGNOSIS — F95.2 TOURETTE DISORDER: ICD-10-CM

## 2021-06-02 RX ORDER — CLONIDINE HYDROCHLORIDE 0.3 MG/1
TABLET ORAL
Qty: 90 TABLET | Refills: 0 | Status: SHIPPED | OUTPATIENT
Start: 2021-06-02 | End: 2021-06-28

## 2021-06-23 ENCOUNTER — TELEPHONE (OUTPATIENT)
Dept: MEDICAL GROUP | Facility: MEDICAL CENTER | Age: 41
End: 2021-06-23

## 2021-06-28 DIAGNOSIS — F95.2 TOURETTE DISORDER: ICD-10-CM

## 2021-06-28 RX ORDER — CLONIDINE HYDROCHLORIDE 0.3 MG/1
TABLET ORAL
Qty: 90 TABLET | Refills: 0 | Status: SHIPPED | OUTPATIENT
Start: 2021-06-28 | End: 2021-07-02 | Stop reason: SDUPTHER

## 2021-07-02 ENCOUNTER — OFFICE VISIT (OUTPATIENT)
Dept: MEDICAL GROUP | Facility: MEDICAL CENTER | Age: 41
End: 2021-07-02
Payer: COMMERCIAL

## 2021-07-02 VITALS
SYSTOLIC BLOOD PRESSURE: 138 MMHG | HEART RATE: 90 BPM | RESPIRATION RATE: 16 BRPM | BODY MASS INDEX: 27.43 KG/M2 | OXYGEN SATURATION: 99 % | WEIGHT: 185.19 LBS | DIASTOLIC BLOOD PRESSURE: 98 MMHG | TEMPERATURE: 98 F | HEIGHT: 69 IN

## 2021-07-02 DIAGNOSIS — G44.89 OTHER HEADACHE SYNDROME: ICD-10-CM

## 2021-07-02 DIAGNOSIS — E03.9 ACQUIRED HYPOTHYROIDISM: ICD-10-CM

## 2021-07-02 DIAGNOSIS — K21.9 GASTROESOPHAGEAL REFLUX DISEASE, UNSPECIFIED WHETHER ESOPHAGITIS PRESENT: ICD-10-CM

## 2021-07-02 DIAGNOSIS — R10.84 GENERALIZED ABDOMINAL PAIN: ICD-10-CM

## 2021-07-02 DIAGNOSIS — F41.9 ANXIETY AND DEPRESSION: ICD-10-CM

## 2021-07-02 DIAGNOSIS — M79.641 RIGHT HAND PAIN: ICD-10-CM

## 2021-07-02 DIAGNOSIS — E78.49 OTHER HYPERLIPIDEMIA: ICD-10-CM

## 2021-07-02 DIAGNOSIS — F32.A ANXIETY AND DEPRESSION: ICD-10-CM

## 2021-07-02 DIAGNOSIS — F42.9 OBSESSIVE-COMPULSIVE DISORDER, UNSPECIFIED TYPE: ICD-10-CM

## 2021-07-02 DIAGNOSIS — F10.20 ALCOHOL USE DISORDER, MODERATE, DEPENDENCE (HCC): ICD-10-CM

## 2021-07-02 DIAGNOSIS — R74.01 TRANSAMINITIS: ICD-10-CM

## 2021-07-02 DIAGNOSIS — D50.0 IRON DEFICIENCY ANEMIA DUE TO CHRONIC BLOOD LOSS: ICD-10-CM

## 2021-07-02 DIAGNOSIS — M25.531 RIGHT WRIST PAIN: ICD-10-CM

## 2021-07-02 DIAGNOSIS — F95.2 TOURETTE DISORDER: ICD-10-CM

## 2021-07-02 PROCEDURE — 99214 OFFICE O/P EST MOD 30 MIN: CPT | Performed by: INTERNAL MEDICINE

## 2021-07-02 RX ORDER — CLONIDINE HYDROCHLORIDE 0.3 MG/1
0.3 TABLET ORAL 3 TIMES DAILY
Qty: 90 TABLET | Refills: 3 | Status: SHIPPED | OUTPATIENT
Start: 2021-07-02 | End: 2021-07-02 | Stop reason: SDUPTHER

## 2021-07-02 RX ORDER — LEVOTHYROXINE SODIUM 0.07 MG/1
TABLET ORAL
Qty: 30 TABLET | Refills: 3 | Status: SHIPPED | OUTPATIENT
Start: 2021-07-02 | End: 2021-08-13

## 2021-07-02 RX ORDER — SUMATRIPTAN 50 MG/1
50 TABLET, FILM COATED ORAL
Qty: 10 TABLET | Refills: 5 | Status: SHIPPED | OUTPATIENT
Start: 2021-07-02 | End: 2021-08-03

## 2021-07-02 RX ORDER — CLONIDINE HYDROCHLORIDE 0.3 MG/1
0.3 TABLET ORAL 3 TIMES DAILY
Qty: 90 TABLET | Refills: 3 | Status: SHIPPED | OUTPATIENT
Start: 2021-07-02 | End: 2021-08-03 | Stop reason: SDUPTHER

## 2021-07-02 ASSESSMENT — PATIENT HEALTH QUESTIONNAIRE - PHQ9
2. FEELING DOWN, DEPRESSED, IRRITABLE, OR HOPELESS: SEVERAL DAYS
5. POOR APPETITE OR OVEREATING: 2 - MORE THAN HALF THE DAYS
SUM OF ALL RESPONSES TO PHQ9 QUESTIONS 1 AND 2: 2
7. TROUBLE CONCENTRATING ON THINGS, SUCH AS READING THE NEWSPAPER OR WATCHING TELEVISION: SEVERAL DAYS
SUM OF ALL RESPONSES TO PHQ QUESTIONS 1-9: 8
SUM OF ALL RESPONSES TO PHQ QUESTIONS 1-9: 8
CLINICAL INTERPRETATION OF PHQ2 SCORE: 2
4. FEELING TIRED OR HAVING LITTLE ENERGY: MORE THAN HALF THE DAYS
1. LITTLE INTEREST OR PLEASURE IN DOING THINGS: SEVERAL DAYS
8. MOVING OR SPEAKING SO SLOWLY THAT OTHER PEOPLE COULD HAVE NOTICED. OR THE OPPOSITE, BEING SO FIGETY OR RESTLESS THAT YOU HAVE BEEN MOVING AROUND A LOT MORE THAN USUAL: NOT AT ALL
3. TROUBLE FALLING OR STAYING ASLEEP OR SLEEPING TOO MUCH: SEVERAL DAYS
5. POOR APPETITE OR OVEREATING: MORE THAN HALF THE DAYS
6. FEELING BAD ABOUT YOURSELF - OR THAT YOU ARE A FAILURE OR HAVE LET YOURSELF OR YOUR FAMILY DOWN: NOT AL ALL
9. THOUGHTS THAT YOU WOULD BE BETTER OFF DEAD, OR OF HURTING YOURSELF: NOT AT ALL

## 2021-07-02 ASSESSMENT — ANXIETY QUESTIONNAIRES
6. BECOMING EASILY ANNOYED OR IRRITABLE: SEVERAL DAYS
7. FEELING AFRAID AS IF SOMETHING AWFUL MIGHT HAPPEN: SEVERAL DAYS
1. FEELING NERVOUS, ANXIOUS, OR ON EDGE: SEVERAL DAYS
3. WORRYING TOO MUCH ABOUT DIFFERENT THINGS: SEVERAL DAYS
2. NOT BEING ABLE TO STOP OR CONTROL WORRYING: NOT AT ALL
4. TROUBLE RELAXING: SEVERAL DAYS
5. BEING SO RESTLESS THAT IT IS HARD TO SIT STILL: NOT AT ALL
GAD7 TOTAL SCORE: 5

## 2021-07-02 ASSESSMENT — FIBROSIS 4 INDEX: FIB4 SCORE: 2.1

## 2021-07-02 NOTE — ASSESSMENT & PLAN NOTE
Chronic headache, right side, twice a week, lasting for hours  Better with dark, quiet environment, resting  Worse bright light, noise  Associate nausea

## 2021-07-02 NOTE — ASSESSMENT & PLAN NOTE
Chronic hx of Tourette disorder, has been referred to psychiatry previously however, patient did not follow up. Patient will call psychiatry.  Currently on clonidine 0.3 mg TID.

## 2021-07-02 NOTE — ASSESSMENT & PLAN NOTE
Chronic reflex. On PPI. Hx of anemia. Following with GI.    4/11/2018 EGD normal esophagus.  Normal mucosa in the second part of duodenum in duodenal bulb.  Erythema in the stomach antrum and stomach body.

## 2021-07-02 NOTE — PROGRESS NOTES
Established Patient    Adis Chance is a 41 y.o. male who presents today with the following:    CC:   Chief Complaint   Patient presents with   • Nausea   • Headache   • Hand Pain     Right hand/wrist hurts after hitting punching bag 3 months ago       HPI:     Right wrist and hand pain after punching a heavy bag in March 2021, he went to urgent care then, hand x ray was unremarkable. He tried icing and using icy hot which helps. However, the pain persists in the hand and wrist. Worse with playing baseline.     Tourette disorder  Chronic hx of Tourette disorder, has been referred to psychiatry previously however, patient did not follow up. Patient will call psychiatry.  Currently on clonidine 0.3 mg TID.       Other headache syndrome  Chronic headache, right side, twice a week, lasting for hours  Better with dark, quiet environment, resting  Worse bright light, noise  Associate nausea        Transaminitis  Related to his alcohol use      Extensive education and counseling provided for alcohol cessation    Recommend patient to go to AA,  contact information of several Alcohol abuse treatment centers given to patient.    Alcohol abstinence   Prevention and treatment of alcohol withdrawal  Fluid management  Nutritional support  Infection surveillance  Prophylaxis against gastric mucosal bleeding     Ref. Range 9/14/2020 10:54   Sodium Latest Ref Range: 135 - 145 mmol/L 137   Potassium Latest Ref Range: 3.6 - 5.5 mmol/L 3.8   Chloride Latest Ref Range: 96 - 112 mmol/L 98   Co2 Latest Ref Range: 20 - 33 mmol/L 24   Anion Gap Latest Ref Range: 7.0 - 16.0  15.0   Glucose Latest Ref Range: 65 - 99 mg/dL 99   Bun Latest Ref Range: 8 - 22 mg/dL 4 (L)   Creatinine Latest Ref Range: 0.50 - 1.40 mg/dL 1.10   GFR If  Latest Ref Range: >60 mL/min/1.73 m 2 >60   GFR If Non  Latest Ref Range: >60 mL/min/1.73 m 2 >60   Calcium Latest Ref Range: 8.5 - 10.5 mg/dL 9.7   AST(SGOT) Latest Ref Range: 12 -  45 U/L 114 (H)   ALT(SGPT) Latest Ref Range: 2 - 50 U/L 70 (H)   Alkaline Phosphatase Latest Ref Range: 30 - 99 U/L 168 (H)   Total Bilirubin Latest Ref Range: 0.1 - 1.5 mg/dL 0.9   Albumin Latest Ref Range: 3.2 - 4.9 g/dL 4.3   Total Protein Latest Ref Range: 6.0 - 8.2 g/dL 7.7   Globulin Latest Ref Range: 1.9 - 3.5 g/dL 3.4   A-G Ratio Latest Units: g/dL 1.3      Ref. Range 11/11/2019 10:27 11/22/2019 15:17 2/6/2020 09:45 3/17/2020 17:49 9/14/2020 10:54   AST(SGOT) Latest Ref Range: 12 - 45 U/L 263 (H) 138 (H) 69 (H) 65 (H) 114 (H)   ALT(SGPT) Latest Ref Range: 2 - 50 U/L 119 (H) 82 (H) 44 37 70 (H)   Alkaline Phosphatase Latest Ref Range: 30 - 99 U/L 102 (H) 110 (H) 80 88 168 (H)   Total Bilirubin Latest Ref Range: 0.1 - 1.5 mg/dL 1.1 1.4 1.3 1.0 0.9   Albumin Latest Ref Range: 3.2 - 4.9 g/dL 4.2 4.4 4.0 4.1 4.3      Ref. Range 11/15/2019 15:51   Hepatitis A Virus Ab, IgM Latest Ref Range: Negative  Negative   Hep A Virus Antibody Total Latest Ref Range: Negative  Negative   Hep B Surface Antibody Quant Latest Ref Range: 0.00 - 10.00 mIU/mL 3.28   Hepatitis B Surface Antigen Latest Ref Range: Negative  Negative   Hepatitis B Cors Ab,IgM Latest Ref Range: Negative  Negative   Hepatitis Be Antigen Latest Ref Range: Negative  Negative   Hepatitis BE Antibody Latest Ref Range: Negative  Negative         GERD (gastroesophageal reflux disease)  Chronic reflex. On PPI. Hx of anemia. Following with GI.    4/11/2018 EGD normal esophagus.  Normal mucosa in the second part of duodenum in duodenal bulb.  Erythema in the stomach antrum and stomach body.       Alcohol use disorder, moderate, dependence (HCC)  Hx of alcohol use, a couple of drinks per day.     Recommend patient to go to , several Alcohol abuse treatment center contact information given to patient.  Education and counseling provided for cutting down and eventually wean off on alcohol  Seek medical attention immediately if experiencing alcohol withdrawal  symptoms  such as headache, insomnia, anxiety, tremors, shaking, nausea, vomiting        Anxiety and depression  Mild anxiety and mild depression  Denies SI/HI      Depression Screening    Little interest or pleasure in doing things?  1 - several days  Feeling down, depressed , or hopeless? 1 - several days  Trouble falling or staying asleep, or sleeping too much?  1 - several days  Feeling tired or having little energy?  2 - more than half the days  Poor appetite or overeating?  2 - more than half the days  Feeling bad about yourself - or that you are a failure or have let yourself or your family down? 0 - not at all  Trouble concentrating on things, such as reading the newspaper or watching television? 1 - several days  Moving or speaking so slowly that other people could have noticed.  Or the opposite - being so fidgety or restless that you have been moving around a lot more than usual?  0 - not at all  Thoughts that you would be better off dead, or of hurting yourself?  0 - not at all  Patient Health Questionnaire Score: 8      If depressive symptoms identified deferred to follow up visit unless specifically addressed in assesment and plan.    Interpretation of PHQ-9 Total Score   Score Severity   1-4 No Depression   5-9 Mild Depression   10-14 Moderate Depression   15-19 Moderately Severe Depression   20-27 Severe Depression    ROCIO-7 Questionnaire    Feeling nervous, anxious, or on edge: Several days  Not being able to sop or control worrying: Not at all  Worrying too much about different things: Several days  Trouble relaxing: Several days  Being so restless that it's hard to sit still: Not at all  Becoming easily annoyed or irritable: Several days  Feeling afraid as if something awful might happen: Several days  Total: 5    Interpretation of ROCIO 7 Total Score   Score Severity :  0-4 No Anxiety   5-9 Mild Anxiety  10-14 Moderate Anxiety  15-21 Severe Anxiety          Current Outpatient Medications   Medication  "Sig Dispense Refill   • SUMAtriptan (IMITREX) 50 MG Tab Take 1 tablet by mouth 1 time a day as needed for Migraine. 10 tablet 5   • levothyroxine (SYNTHROID) 75 MCG Tab TAKE 1 TAB BY MOUTH EVERY MORNING ON AN EMPTY STOMACH. *MAX 30 DAYS PER INS* 30 tablet 3   • cloNIDine (CATAPRES) 0.3 MG Tab Take 1 tablet by mouth 3 times a day. 90 tablet 3   • ferrous sulfate 325 (65 Fe) MG tablet TAKE 1 TABLET BY MOUTH EVERY DAY 90 Tab 1   • cimetidine (TAGAMET) 200 MG tablet TAKE 1 TABLET BY MOUTH AT BEDTIME FOR HEARTBURN FOR 30 DAYS *OTC NOT COV D*     • dicyclomine (BENTYL) 10 MG Cap TAKE 1 TABLET BY MOUTH EVERY 6 HOURS AS NEEDED FOR ABDOMINAL CRAMPS AND/OR DISCOMFORT     • pantoprazole (PROTONIX) 40 MG Tablet Delayed Response Take 40 mg by mouth every day.     • ondansetron (ZOFRAN) 4 MG Tab tablet Take 4 mg by mouth every four hours as needed.       No current facility-administered medications for this visit.       Allergies, past medical history, past surgical history, medications, family history, social history reviewed and updated.    ROS   Constitutional: Denies fevers or chills  Eyes: Denies changes in vision  Ears/Nose/Throat/Mouth: Denies nasal congestion or sore throat   Cardiovascular: Denies chest pain or palpitations   Respiratory: Denies shortness of breath , Denies cough  Gastrointestinal/Hepatic: nausea, chronic abdominal pain  Genitourinary: Denies dysuria or frequency  Musculoskeletal/Rheum: right hand and wrist pain  Neurological: intermittent headache  Psychiatric: mild anxiety, depression, denies SI/HI  Endocrine: hypothyroidism Denies hx of diabetes   Heme/Oncology/Lymph Nodes: Denies weight changes or enlarged LNs.    Physical Exam  Vitals: /98 (BP Location: Left arm, Patient Position: Sitting, BP Cuff Size: Adult)   Pulse 90   Temp 36.7 °C (98 °F) (Temporal)   Resp 16   Ht 1.76 m (5' 9.29\")   Wt 84 kg (185 lb 3 oz)   SpO2 99%   BMI 27.12 kg/m²   General: Alert, pleasant, NAD  HEENT: " Normocephalic.  EOMI, no icterus or pallor.  Conjunctivae and lids normal. External ears normal. Wearing a mask. Oropharynx non-erythematous, mucous membranes moist.  Neck supple.  No thyromegaly or masses palpated.   Lymph: No cervical or supraclavicular lymphadenopathy.  Cardiovascular: Regular rate and rhythm.  S1 and S2 normal.  No murmurs appreciated.  Respiratory: Normal respiratory effort.  Clear to auscultation bilaterally.  Abdomen: Non-distended, soft, epigastric tenderness, lower quadrant tenderness  Skin: Warm, dry  Musculoskeletal: Gait is normal.  Moves all extremities well.  Extremities: No leg edema.    Psych:  Affect/mood is at his baseline, judgement is fair, memory is at baseline, grooming is appropriate.      Assessment and Plan    1. Right hand pain  - DX-HAND 3+ RIGHT; Future  - REFERRAL TO ORTHOPEDICS    2. Right wrist pain  - DX-WRIST-COMPLETE 3+ RIGHT; Future  - REFERRAL TO ORTHOPEDICS    3. Acquired hypothyroidism  - Comp Metabolic Panel; Future  - TSH WITH REFLEX TO FT4; Future  - levothyroxine (SYNTHROID) 75 MCG Tab; TAKE 1 TAB BY MOUTH EVERY MORNING ON AN EMPTY STOMACH. *MAX 30 DAYS PER INS*  Dispense: 30 tablet; Refill: 3    4. Iron deficiency anemia due to chronic blood loss  Due to his intermittent rectal bleeding. Now it has improved per patient  - CBC WITH DIFFERENTIAL; Future  - IRON/TOTAL IRON BIND; Future  - FERRITIN; Future  Follow with GI    5. Other hyperlipidemia  - Lipid Profile; Future  Healthful lifestyle measures    6. Tourette disorder  - REFERRAL TO PSYCHIATRY  - cloNIDine (CATAPRES) 0.3 MG Tab; Take 1 tablet by mouth 3 times a day.  Dispense: 90 tablet; Refill: 3    7. Anxiety and depression  - REFERRAL TO PSYCHIATRY    8. Alcohol use disorder, moderate, dependence (HCC)  - REFERRAL TO PSYCHIATRY    9. Obsessive-compulsive disorder, unspecified type  - REFERRAL TO PSYCHIATRY    10. Transaminitis  - Comp Metabolic Panel; Future  Alcohol cessation recommended    11.  Generalized abdominal pain  chronic  - CBC WITH DIFFERENTIAL; Future  - Comp Metabolic Panel; Future  - US-ABDOMEN COMPLETE SURVEY; Future  Supportive, recommend to follow with GI as well  Seek medical attention if symptoms do not improve or worsen  Alcohol cessation recommended    12. Other headache syndrome  - SUMAtriptan (IMITREX) 50 MG Tab; Take 1 tablet by mouth 1 time a day as needed for Migraine.  Dispense: 10 tablet; Refill: 5  - REFERRAL TO NEUROLOGY    13. Gastroesophageal reflux disease, unspecified whether esophagitis present  PPI   follow with GI  Avoid triggers  Stay upright for during eating and for 3 hours after eating  Elevated head of bed       Follow-up:Return in about 1 year (around 7/2/2022), or if symptoms worsen or fail to improve.    This note was created using voice recognition software. There may be unintended errors in spelling, grammar or content.

## 2021-07-02 NOTE — ASSESSMENT & PLAN NOTE
Related to his alcohol use      Extensive education and counseling provided for alcohol cessation    Recommend patient to go to AA,  contact information of several Alcohol abuse treatment centers given to patient.    Alcohol abstinence   Prevention and treatment of alcohol withdrawal  Fluid management  Nutritional support  Infection surveillance  Prophylaxis against gastric mucosal bleeding     Ref. Range 9/14/2020 10:54   Sodium Latest Ref Range: 135 - 145 mmol/L 137   Potassium Latest Ref Range: 3.6 - 5.5 mmol/L 3.8   Chloride Latest Ref Range: 96 - 112 mmol/L 98   Co2 Latest Ref Range: 20 - 33 mmol/L 24   Anion Gap Latest Ref Range: 7.0 - 16.0  15.0   Glucose Latest Ref Range: 65 - 99 mg/dL 99   Bun Latest Ref Range: 8 - 22 mg/dL 4 (L)   Creatinine Latest Ref Range: 0.50 - 1.40 mg/dL 1.10   GFR If  Latest Ref Range: >60 mL/min/1.73 m 2 >60   GFR If Non  Latest Ref Range: >60 mL/min/1.73 m 2 >60   Calcium Latest Ref Range: 8.5 - 10.5 mg/dL 9.7   AST(SGOT) Latest Ref Range: 12 - 45 U/L 114 (H)   ALT(SGPT) Latest Ref Range: 2 - 50 U/L 70 (H)   Alkaline Phosphatase Latest Ref Range: 30 - 99 U/L 168 (H)   Total Bilirubin Latest Ref Range: 0.1 - 1.5 mg/dL 0.9   Albumin Latest Ref Range: 3.2 - 4.9 g/dL 4.3   Total Protein Latest Ref Range: 6.0 - 8.2 g/dL 7.7   Globulin Latest Ref Range: 1.9 - 3.5 g/dL 3.4   A-G Ratio Latest Units: g/dL 1.3      Ref. Range 11/11/2019 10:27 11/22/2019 15:17 2/6/2020 09:45 3/17/2020 17:49 9/14/2020 10:54   AST(SGOT) Latest Ref Range: 12 - 45 U/L 263 (H) 138 (H) 69 (H) 65 (H) 114 (H)   ALT(SGPT) Latest Ref Range: 2 - 50 U/L 119 (H) 82 (H) 44 37 70 (H)   Alkaline Phosphatase Latest Ref Range: 30 - 99 U/L 102 (H) 110 (H) 80 88 168 (H)   Total Bilirubin Latest Ref Range: 0.1 - 1.5 mg/dL 1.1 1.4 1.3 1.0 0.9   Albumin Latest Ref Range: 3.2 - 4.9 g/dL 4.2 4.4 4.0 4.1 4.3      Ref. Range 11/15/2019 15:51   Hepatitis A Virus Ab, IgM Latest Ref Range: Negative   Negative   Hep A Virus Antibody Total Latest Ref Range: Negative  Negative   Hep B Surface Antibody Quant Latest Ref Range: 0.00 - 10.00 mIU/mL 3.28   Hepatitis B Surface Antigen Latest Ref Range: Negative  Negative   Hepatitis B Cors Ab,IgM Latest Ref Range: Negative  Negative   Hepatitis Be Antigen Latest Ref Range: Negative  Negative   Hepatitis BE Antibody Latest Ref Range: Negative  Negative

## 2021-08-03 ENCOUNTER — OFFICE VISIT (OUTPATIENT)
Dept: MEDICAL GROUP | Facility: MEDICAL CENTER | Age: 41
End: 2021-08-03
Payer: COMMERCIAL

## 2021-08-03 VITALS
BODY MASS INDEX: 26.45 KG/M2 | SYSTOLIC BLOOD PRESSURE: 122 MMHG | RESPIRATION RATE: 14 BRPM | HEIGHT: 69 IN | DIASTOLIC BLOOD PRESSURE: 84 MMHG | OXYGEN SATURATION: 98 % | HEART RATE: 87 BPM | TEMPERATURE: 97.4 F | WEIGHT: 178.57 LBS

## 2021-08-03 DIAGNOSIS — R00.2 PALPITATIONS: ICD-10-CM

## 2021-08-03 DIAGNOSIS — F95.2 TOURETTE DISORDER: ICD-10-CM

## 2021-08-03 PROCEDURE — 93000 ELECTROCARDIOGRAM COMPLETE: CPT | Performed by: INTERNAL MEDICINE

## 2021-08-03 PROCEDURE — 99213 OFFICE O/P EST LOW 20 MIN: CPT | Performed by: INTERNAL MEDICINE

## 2021-08-03 RX ORDER — CLONIDINE HYDROCHLORIDE 0.3 MG/1
0.3 TABLET ORAL 3 TIMES DAILY
Qty: 90 TABLET | Refills: 3 | Status: SHIPPED | OUTPATIENT
Start: 2021-08-03 | End: 2022-03-23

## 2021-08-03 ASSESSMENT — FIBROSIS 4 INDEX: FIB4 SCORE: 2.1

## 2021-08-03 NOTE — PROGRESS NOTES
Established Patient    Adis Chance is a 41 y.o. male who presents today with the following:    CC:   Chief Complaint   Patient presents with   • Other     Rapid heart beat   • Medication Follow-up     Clonidine       HPI:     Heart palpitations  Intermittent palpitations, heart racing in the evening or getting up for a while      denies chest pain, SOB, syncope     Current Outpatient Medications   Medication Sig Dispense Refill   • cloNIDine (CATAPRES) 0.3 MG Tab Take 1 tablet by mouth 3 times a day. 90 tablet 3   • levothyroxine (SYNTHROID) 75 MCG Tab TAKE 1 TAB BY MOUTH EVERY MORNING ON AN EMPTY STOMACH. *MAX 30 DAYS PER INS* 30 tablet 3   • ferrous sulfate 325 (65 Fe) MG tablet TAKE 1 TABLET BY MOUTH EVERY DAY 90 Tab 1   • cimetidine (TAGAMET) 200 MG tablet TAKE 1 TABLET BY MOUTH AT BEDTIME FOR HEARTBURN FOR 30 DAYS *OTC NOT COV D*     • dicyclomine (BENTYL) 10 MG Cap TAKE 1 TABLET BY MOUTH EVERY 6 HOURS AS NEEDED FOR ABDOMINAL CRAMPS AND/OR DISCOMFORT     • pantoprazole (PROTONIX) 40 MG Tablet Delayed Response Take 40 mg by mouth every day.     • ondansetron (ZOFRAN) 4 MG Tab tablet Take 4 mg by mouth every four hours as needed.       No current facility-administered medications for this visit.       Allergies, past medical history, past surgical history, medications, family history, social history reviewed and updated.    ROS   Constitutional: Denies fevers or chills  Eyes: Denies changes in vision  Ears/Nose/Throat/Mouth: Denies nasal congestion or sore throat   Cardiovascular: per HPI  Respiratory: Denies shortness of breath , Denies cough  Gastrointestinal/Hepatic: Denies abd pain, nausea, vomiting   Genitourinary: Denies dysuria or frequency  Musculoskeletal/Rheum: Denies joint pain and swelling   Neurological: Denies headache  Psychiatric: Denies mood disorder   Endocrine: Denies hx of diabetes or thyroid dysfunction  Heme/Oncology/Lymph Nodes: Denies weight changes or enlarged LNs.    Physical  "Exam  Vitals: Pulse 87   Temp 36.3 °C (97.4 °F) (Temporal)   Resp 14   Ht 1.753 m (5' 9\")   Wt 81 kg (178 lb 9.2 oz)   SpO2 98%   BMI 26.37 kg/m²   General: Alert, pleasant, NAD  HEENT: Normocephalic.  EOMI, no icterus or pallor.  Conjunctivae and lids normal. External ears normal. Wearing a mask. Oropharynx non-erythematous, mucous membranes moist.  Neck supple.  No thyromegaly or masses palpated.   Lymph: No cervical or supraclavicular lymphadenopathy.  Cardiovascular: Regular rate and rhythm.  S1 and S2 normal.  No murmurs appreciated.  Respiratory: Normal respiratory effort.  Clear to auscultation bilaterally.  Abdomen: Non-distended, soft, non-tender  Skin: Warm, dry, no rashes.  Musculoskeletal: Gait is normal.  Moves all extremities well.  Extremities: No leg edema.  Radial pulses 2+ symmetric.   Psych:  Affect/mood is normal, judgement is good, memory is intact, grooming is appropriate.          Assessment and Plan    1. Heart palpitations  - HOLTER - Cardiology Performed (48HR); Future  - EC-ECHOCARDIOGRAM COMPLETE W/O CONT; Future  - TROPONIN; Future  - MAGNESIUM; Future  - TSH with reflex FT4  - Comp Metabolic Panel; Future    8/3/21 3:22 pm  EKG Interpretation   To my reading today:  Rhythm: sinus rhythm    Rate: 69  Axis: P 63, QRS 10, T 18  Ectopy: none  Conduction:  QRSD 87    ST Segments: Unremarkable  T Waves: Unremarkable  Q Waves: none   Clinical Impression: Sinus rhythm, borderline prolonged ID interval    2. Tourette disorder  - cloNIDine (CATAPRES) 0.3 MG Tab; Take 1 tablet by mouth 3 times a day.  Dispense: 90 tablet; Refill: 3        Follow-up:Return if symptoms worsen or fail to improve.    This note was created using voice recognition software. There may be unintended errors in spelling, grammar or content.    "

## 2021-08-05 ENCOUNTER — HOSPITAL ENCOUNTER (OUTPATIENT)
Dept: LAB | Facility: MEDICAL CENTER | Age: 41
End: 2021-08-05
Attending: INTERNAL MEDICINE
Payer: COMMERCIAL

## 2021-08-05 DIAGNOSIS — M79.672 FOOT PAIN, BILATERAL: ICD-10-CM

## 2021-08-05 DIAGNOSIS — R00.2 PALPITATIONS: ICD-10-CM

## 2021-08-05 DIAGNOSIS — M79.671 FOOT PAIN, BILATERAL: ICD-10-CM

## 2021-08-05 DIAGNOSIS — N32.89 BLADDER WALL THICKENING: ICD-10-CM

## 2021-08-05 DIAGNOSIS — E03.9 ACQUIRED HYPOTHYROIDISM: ICD-10-CM

## 2021-08-05 DIAGNOSIS — Z11.3 SCREEN FOR STD (SEXUALLY TRANSMITTED DISEASE): ICD-10-CM

## 2021-08-05 DIAGNOSIS — E78.49 OTHER HYPERLIPIDEMIA: ICD-10-CM

## 2021-08-05 DIAGNOSIS — R10.84 GENERALIZED ABDOMINAL PAIN: ICD-10-CM

## 2021-08-05 DIAGNOSIS — D50.0 IRON DEFICIENCY ANEMIA DUE TO CHRONIC BLOOD LOSS: ICD-10-CM

## 2021-08-05 LAB
ALBUMIN SERPL BCP-MCNC: 3.8 G/DL (ref 3.2–4.9)
ALBUMIN/GLOB SERPL: 1.1 G/DL
ALP SERPL-CCNC: 206 U/L (ref 30–99)
ALT SERPL-CCNC: 66 U/L (ref 2–50)
ANION GAP SERPL CALC-SCNC: 12 MMOL/L (ref 7–16)
APPEARANCE UR: CLEAR
AST SERPL-CCNC: 129 U/L (ref 12–45)
BASOPHILS # BLD AUTO: 1.4 % (ref 0–1.8)
BASOPHILS # BLD: 0.07 K/UL (ref 0–0.12)
BILIRUB SERPL-MCNC: 1.7 MG/DL (ref 0.1–1.5)
BILIRUB UR QL STRIP.AUTO: NEGATIVE
BUN SERPL-MCNC: 3 MG/DL (ref 8–22)
CALCIUM SERPL-MCNC: 8.6 MG/DL (ref 8.5–10.5)
CHLORIDE SERPL-SCNC: 98 MMOL/L (ref 96–112)
CHOLEST SERPL-MCNC: 169 MG/DL (ref 100–199)
CO2 SERPL-SCNC: 25 MMOL/L (ref 20–33)
COLOR UR: YELLOW
CREAT SERPL-MCNC: 0.86 MG/DL (ref 0.5–1.4)
EOSINOPHIL # BLD AUTO: 0.14 K/UL (ref 0–0.51)
EOSINOPHIL NFR BLD: 2.8 % (ref 0–6.9)
ERYTHROCYTE [DISTWIDTH] IN BLOOD BY AUTOMATED COUNT: 59.2 FL (ref 35.9–50)
FASTING STATUS PATIENT QL REPORTED: NORMAL
FERRITIN SERPL-MCNC: 21.9 NG/ML (ref 22–322)
GLOBULIN SER CALC-MCNC: 3.6 G/DL (ref 1.9–3.5)
GLUCOSE SERPL-MCNC: 98 MG/DL (ref 65–99)
GLUCOSE UR STRIP.AUTO-MCNC: NEGATIVE MG/DL
HBV SURFACE AB SERPL IA-ACNC: 13.9 MIU/ML (ref 0–10)
HBV SURFACE AG SER QL: NORMAL
HCT VFR BLD AUTO: 42.3 % (ref 42–52)
HCV AB SER QL: NORMAL
HDLC SERPL-MCNC: 65 MG/DL
HGB BLD-MCNC: 13.4 G/DL (ref 14–18)
HIV 1+2 AB+HIV1 P24 AG SERPL QL IA: NORMAL
IMM GRANULOCYTES # BLD AUTO: 0.01 K/UL (ref 0–0.11)
IMM GRANULOCYTES NFR BLD AUTO: 0.2 % (ref 0–0.9)
IRON SATN MFR SERPL: 15 % (ref 15–55)
IRON SERPL-MCNC: 52 UG/DL (ref 50–180)
KETONES UR STRIP.AUTO-MCNC: NEGATIVE MG/DL
LDLC SERPL CALC-MCNC: 86 MG/DL
LEUKOCYTE ESTERASE UR QL STRIP.AUTO: NEGATIVE
LYMPHOCYTES # BLD AUTO: 1.45 K/UL (ref 1–4.8)
LYMPHOCYTES NFR BLD: 28.6 % (ref 22–41)
MAGNESIUM SERPL-MCNC: 1.7 MG/DL (ref 1.5–2.5)
MCH RBC QN AUTO: 29.5 PG (ref 27–33)
MCHC RBC AUTO-ENTMCNC: 31.7 G/DL (ref 33.7–35.3)
MCV RBC AUTO: 93.2 FL (ref 81.4–97.8)
MICRO URNS: NORMAL
MONOCYTES # BLD AUTO: 0.61 K/UL (ref 0–0.85)
MONOCYTES NFR BLD AUTO: 12 % (ref 0–13.4)
NEUTROPHILS # BLD AUTO: 2.79 K/UL (ref 1.82–7.42)
NEUTROPHILS NFR BLD: 55 % (ref 44–72)
NITRITE UR QL STRIP.AUTO: NEGATIVE
NRBC # BLD AUTO: 0 K/UL
NRBC BLD-RTO: 0 /100 WBC
PH UR STRIP.AUTO: 6.5 [PH] (ref 5–8)
PLATELET # BLD AUTO: 297 K/UL (ref 164–446)
PMV BLD AUTO: 9.6 FL (ref 9–12.9)
POTASSIUM SERPL-SCNC: 3.9 MMOL/L (ref 3.6–5.5)
PROT SERPL-MCNC: 7.4 G/DL (ref 6–8.2)
PROT UR QL STRIP: NEGATIVE MG/DL
RBC # BLD AUTO: 4.54 M/UL (ref 4.7–6.1)
RBC UR QL AUTO: NEGATIVE
SODIUM SERPL-SCNC: 135 MMOL/L (ref 135–145)
SP GR UR STRIP.AUTO: 1.01
T4 FREE SERPL-MCNC: 0.93 NG/DL (ref 0.93–1.7)
TIBC SERPL-MCNC: 357 UG/DL (ref 250–450)
TREPONEMA PALLIDUM IGG+IGM AB [PRESENCE] IN SERUM OR PLASMA BY IMMUNOASSAY: NORMAL
TRIGL SERPL-MCNC: 90 MG/DL (ref 0–149)
TROPONIN T SERPL-MCNC: 10 NG/L (ref 6–19)
TSH SERPL DL<=0.005 MIU/L-ACNC: 7.23 UIU/ML (ref 0.38–5.33)
UIBC SERPL-MCNC: 305 UG/DL (ref 110–370)
URATE SERPL-MCNC: 8.7 MG/DL (ref 2.5–8.3)
UROBILINOGEN UR STRIP.AUTO-MCNC: 0.2 MG/DL
WBC # BLD AUTO: 5.1 K/UL (ref 4.8–10.8)

## 2021-08-05 PROCEDURE — 86706 HEP B SURFACE ANTIBODY: CPT

## 2021-08-05 PROCEDURE — 84443 ASSAY THYROID STIM HORMONE: CPT

## 2021-08-05 PROCEDURE — 83550 IRON BINDING TEST: CPT

## 2021-08-05 PROCEDURE — 84484 ASSAY OF TROPONIN QUANT: CPT

## 2021-08-05 PROCEDURE — 87491 CHLMYD TRACH DNA AMP PROBE: CPT

## 2021-08-05 PROCEDURE — 84550 ASSAY OF BLOOD/URIC ACID: CPT

## 2021-08-05 PROCEDURE — 83735 ASSAY OF MAGNESIUM: CPT

## 2021-08-05 PROCEDURE — 81003 URINALYSIS AUTO W/O SCOPE: CPT

## 2021-08-05 PROCEDURE — 86803 HEPATITIS C AB TEST: CPT

## 2021-08-05 PROCEDURE — 87389 HIV-1 AG W/HIV-1&-2 AB AG IA: CPT

## 2021-08-05 PROCEDURE — 80053 COMPREHEN METABOLIC PANEL: CPT

## 2021-08-05 PROCEDURE — 36415 COLL VENOUS BLD VENIPUNCTURE: CPT

## 2021-08-05 PROCEDURE — 85025 COMPLETE CBC W/AUTO DIFF WBC: CPT

## 2021-08-05 PROCEDURE — 82728 ASSAY OF FERRITIN: CPT

## 2021-08-05 PROCEDURE — 84439 ASSAY OF FREE THYROXINE: CPT

## 2021-08-05 PROCEDURE — 87591 N.GONORRHOEAE DNA AMP PROB: CPT

## 2021-08-05 PROCEDURE — 86780 TREPONEMA PALLIDUM: CPT

## 2021-08-05 PROCEDURE — 80061 LIPID PANEL: CPT

## 2021-08-05 PROCEDURE — 83540 ASSAY OF IRON: CPT

## 2021-08-05 PROCEDURE — 87340 HEPATITIS B SURFACE AG IA: CPT

## 2021-08-13 ENCOUNTER — OFFICE VISIT (OUTPATIENT)
Dept: MEDICAL GROUP | Facility: MEDICAL CENTER | Age: 41
End: 2021-08-13
Payer: COMMERCIAL

## 2021-08-13 VITALS
HEART RATE: 82 BPM | WEIGHT: 184.08 LBS | TEMPERATURE: 98.4 F | HEIGHT: 70 IN | RESPIRATION RATE: 18 BRPM | SYSTOLIC BLOOD PRESSURE: 110 MMHG | BODY MASS INDEX: 26.35 KG/M2 | OXYGEN SATURATION: 98 % | DIASTOLIC BLOOD PRESSURE: 64 MMHG

## 2021-08-13 DIAGNOSIS — R74.01 TRANSAMINITIS: ICD-10-CM

## 2021-08-13 DIAGNOSIS — F10.20 ALCOHOL USE DISORDER, MODERATE, DEPENDENCE (HCC): ICD-10-CM

## 2021-08-13 DIAGNOSIS — E03.9 ACQUIRED HYPOTHYROIDISM: ICD-10-CM

## 2021-08-13 DIAGNOSIS — D50.0 IRON DEFICIENCY ANEMIA DUE TO CHRONIC BLOOD LOSS: ICD-10-CM

## 2021-08-13 PROCEDURE — 99214 OFFICE O/P EST MOD 30 MIN: CPT | Performed by: INTERNAL MEDICINE

## 2021-08-13 RX ORDER — FERROUS SULFATE 325(65) MG
325 TABLET ORAL
Qty: 90 TABLET | Refills: 1 | Status: SHIPPED | OUTPATIENT
Start: 2021-08-13 | End: 2022-02-04

## 2021-08-13 RX ORDER — FAMOTIDINE 40 MG/1
40 TABLET, FILM COATED ORAL
COMMUNITY
Start: 2021-07-31 | End: 2021-08-13

## 2021-08-13 RX ORDER — LEVOTHYROXINE SODIUM 88 UG/1
88 TABLET ORAL
Qty: 30 TABLET | Refills: 11 | Status: SHIPPED | OUTPATIENT
Start: 2021-08-13 | End: 2022-02-07

## 2021-08-13 ASSESSMENT — FIBROSIS 4 INDEX: FIB4 SCORE: 2.14

## 2021-08-13 NOTE — ASSESSMENT & PLAN NOTE
Intermittent Fatigue. Thyroid function improving  He has been compliant since 11/11/19. Compliance counseling provided.   10/16/2019 TSH 10.82H, FT 0.52     Ref. Range 2/6/2020 09:45   TSH Latest Ref Range: 0.380 - 5.330 uIU/mL 6.520 (H)   Free T-4 Latest Ref Range: 0.53 - 1.43 ng/dL 0.82      Ref. Range 8/5/2021 13:41   TSH Latest Ref Range: 0.380 - 5.330 uIU/mL 7.230 (H)   Free T-4 Latest Ref Range: 0.93 - 1.70 ng/dL 0.93      Ref. Range 6/17/2019 16:03   Microsomal -Tpo- Abs Latest Ref Range: 0.0 - 9.0 IU/mL 228.2 (H)     Will increase synthroid to 88 MCG daily  monitor thyroid function and adjust synthroid accordingly

## 2021-08-13 NOTE — ASSESSMENT & PLAN NOTE
Hx of intermittent rectal bleeding from hemorrhoid. Following with GI on PPI and famotidine. Follow with GI  No rectal bleeding today.     4/11/2018 EGD normal esophagus.  Normal mucosa in the second part of duodenum in duodenal bulb.  Erythema in the stomach antrum and stomach body.  Colonoscopy: External hemorrhoids.  Grade 4 internal hemorrhoids.  Normal mucosa in the whole colon.      Chronic hx of iron deficiency anemia due to hemorrhoids. Every other month he would have exacerbation of symptoms.  Recommend patient not to overstrain when having BM. However, his OCD is making him hard to stop keep pushing even after all of BM has been passed.Pending appointment with behavioral medicine to OCD.     Stop alcohol use.   PO iron with Vitamin C daily  Seek medical attention immediately for rectal bleeding.

## 2021-08-13 NOTE — ASSESSMENT & PLAN NOTE
Related to his alcohol use      Extensive education and counseling provided for alcohol cessation    Recommend patient to go to AA,  contact information of several Alcohol abuse treatment centers given to patient.    Alcohol abstinence   Prevention and treatment of alcohol withdrawal  Fluid management  Nutritional support  Infection surveillance  Prophylaxis against gastric mucosal bleeding

## 2021-08-13 NOTE — PROGRESS NOTES
Established Patient    Adis Chance is a 41 y.o. male who presents today with the following:    CC:   Chief Complaint   Patient presents with   • Follow-Up     6 wk   • Lab Results       HPI:     Alcohol use disorder, moderate, dependence (HCC)  Chronic alcohol use, he is trying to cut down on ETOH  A couple drinks per day.     Recommend patient to go to , several Alcohol abuse treatment center contact information given to patient.  Education and counseling provided for cutting down and eventually wean off on alcohol  Seek medical attention immediately if experiencing alcohol withdrawal symptoms  such as headache, insomnia, anxiety, tremors, shaking, nausea, vomiting        Iron deficiency anemia due to chronic blood loss  Hx of intermittent rectal bleeding from hemorrhoid. Following with GI on PPI and famotidine. Follow with GI  No rectal bleeding today.     4/11/2018 EGD normal esophagus.  Normal mucosa in the second part of duodenum in duodenal bulb.  Erythema in the stomach antrum and stomach body.  Colonoscopy: External hemorrhoids.  Grade 4 internal hemorrhoids.  Normal mucosa in the whole colon.      Chronic hx of iron deficiency anemia due to hemorrhoids. Every other month he would have exacerbation of symptoms.  Recommend patient not to overstrain when having BM. However, his OCD is making him hard to stop keep pushing even after all of BM has been passed.Pending appointment with behavioral medicine to OCD.     Stop alcohol use.   PO iron with Vitamin C daily  Seek medical attention immediately for rectal bleeding.         Transaminitis  Related to his alcohol use      Extensive education and counseling provided for alcohol cessation    Recommend patient to go to ,  contact information of several Alcohol abuse treatment centers given to patient.    Alcohol abstinence   Prevention and treatment of alcohol withdrawal  Fluid management  Nutritional support  Infection surveillance  Prophylaxis against  gastric mucosal bleeding        Hypothyroidism  Intermittent Fatigue. Thyroid function improving  He has been compliant since 11/11/19. Compliance counseling provided.   10/16/2019 TSH 10.82H, FT 0.52     Ref. Range 2/6/2020 09:45   TSH Latest Ref Range: 0.380 - 5.330 uIU/mL 6.520 (H)   Free T-4 Latest Ref Range: 0.53 - 1.43 ng/dL 0.82      Ref. Range 8/5/2021 13:41   TSH Latest Ref Range: 0.380 - 5.330 uIU/mL 7.230 (H)   Free T-4 Latest Ref Range: 0.93 - 1.70 ng/dL 0.93      Ref. Range 6/17/2019 16:03   Microsomal -Tpo- Abs Latest Ref Range: 0.0 - 9.0 IU/mL 228.2 (H)     Will increase synthroid to 88 MCG daily  monitor thyroid function and adjust synthroid accordingly            Current Outpatient Medications   Medication Sig Dispense Refill   • levothyroxine (SYNTHROID) 88 MCG Tab Take 1 Tablet by mouth every morning on an empty stomach. 30 Tablet 11   • ferrous sulfate 325 (65 Fe) MG tablet Take 1 Tablet by mouth every day. 90 Tablet 1   • cloNIDine (CATAPRES) 0.3 MG Tab Take 1 tablet by mouth 3 times a day. 90 tablet 3   • dicyclomine (BENTYL) 10 MG Cap TAKE 1 TABLET BY MOUTH EVERY 6 HOURS AS NEEDED FOR ABDOMINAL CRAMPS AND/OR DISCOMFORT     • pantoprazole (PROTONIX) 40 MG Tablet Delayed Response Take 40 mg by mouth every day.     • ondansetron (ZOFRAN) 4 MG Tab tablet Take 4 mg by mouth every four hours as needed.       No current facility-administered medications for this visit.       Allergies, past medical history, past surgical history, medications, family history, social history reviewed and updated.    ROS   Constitutional: Denies fevers or chills  Eyes: Denies changes in vision  Ears/Nose/Throat/Mouth: Denies nasal congestion or sore throat   Cardiovascular: Denies chest pain or palpitations   Respiratory: Denies shortness of breath , Denies cough  Gastrointestinal/Hepatic: Denies abd pain, nausea, vomiting   Genitourinary: Denies dysuria or frequency  Musculoskeletal/Rheum: Denies joint pain and  "swelling   Neurological: Denies headache  Psychiatric: Denies mood disorder   Endocrine: hypothyroidism Denies hx of diabetes   Heme/Oncology/Lymph Nodes: Denies weight changes or enlarged LNs.    Physical Exam  Vitals: /64 (BP Location: Left arm, Patient Position: Sitting, BP Cuff Size: Adult)   Pulse 82   Temp 36.9 °C (98.4 °F) (Temporal)   Resp 18   Ht 1.77 m (5' 9.69\")   Wt 83.5 kg (184 lb 1.4 oz)   SpO2 98%   BMI 26.65 kg/m²   General: Alert, pleasant, NAD  HEENT: Normocephalic.  EOMI, no icterus or pallor.  Conjunctivae and lids normal. External ears normal. Wearing a mask. Oropharynx non-erythematous, mucous membranes moist.  Neck supple.  No thyromegaly or masses palpated.   Lymph: No cervical or supraclavicular lymphadenopathy.  Cardiovascular: Regular rate and rhythm.    Respiratory: Normal respiratory effort.  Clear to auscultation bilaterally.  Abdomen: Non-distended, soft, mild abdominal tenderness  Skin: Warm, dry  Musculoskeletal: Gait is normal.  Moves all extremities well.  Extremities: No leg edema.  Radial pulses 2+ symmetric.   Psych:  Affect/mood is normal, judgement is good, memory is intact, grooming is appropriate.        Assessment and Plan    1. Acquired hypothyroidism  - levothyroxine (SYNTHROID) 88 MCG Tab; Take 1 Tablet by mouth every morning on an empty stomach.  Dispense: 30 Tablet; Refill: 11  - TSH WITH REFLEX TO FT4; Future    2. Iron deficiency anemia due to chronic blood loss  - ferrous sulfate 325 (65 Fe) MG tablet; Take 1 Tablet by mouth every day.  Dispense: 90 Tablet; Refill: 1  Vitamin C  - CBC WITH DIFFERENTIAL; Future  - IRON/TOTAL IRON BIND; Future  - FERRITIN; Future  Recommend to follow with GI    3. Transaminitis  - Comp Metabolic Panel; Future  Related to alcohol use  Education and counseling provided for alcohol cessation  Pending ultrasound    4. Alcohol use disorder, moderate, dependence (HCC)  Chronic alcohol use, he is trying to cut down on ETOH  A " couple drinks per day.     Recommend patient to go to , several Alcohol abuse treatment center contact information given to patient.  Education and counseling provided for cutting down and eventually wean off on alcohol  Seek medical attention immediately if experiencing alcohol withdrawal symptoms  such as headache, insomnia, anxiety, tremors, shaking, nausea, vomiting          Follow-up:Return in about 3 months (around 11/13/2021), or if symptoms worsen or fail to improve.    This note was created using voice recognition software. There may be unintended errors in spelling, grammar or content.

## 2021-08-13 NOTE — ASSESSMENT & PLAN NOTE
Chronic alcohol use, he is trying to cut down on ETOH  A couple drinks per day.     Recommend patient to go to , several Alcohol abuse treatment center contact information given to patient.  Education and counseling provided for cutting down and eventually wean off on alcohol  Seek medical attention immediately if experiencing alcohol withdrawal symptoms  such as headache, insomnia, anxiety, tremors, shaking, nausea, vomiting

## 2021-12-10 ENCOUNTER — HOSPITAL ENCOUNTER (OUTPATIENT)
Dept: CARDIOLOGY | Facility: MEDICAL CENTER | Age: 41
End: 2021-12-10
Attending: INTERNAL MEDICINE
Payer: COMMERCIAL

## 2021-12-10 DIAGNOSIS — R00.2 PALPITATIONS: ICD-10-CM

## 2021-12-10 LAB
LV EJECT FRACT  99904: 60
LV EJECT FRACT MOD 2C 99903: 62.56
LV EJECT FRACT MOD 4C 99902: 62.4
LV EJECT FRACT MOD BP 99901: 62.08

## 2021-12-10 PROCEDURE — 93306 TTE W/DOPPLER COMPLETE: CPT | Mod: 26 | Performed by: INTERNAL MEDICINE

## 2021-12-10 PROCEDURE — 93306 TTE W/DOPPLER COMPLETE: CPT

## 2021-12-13 ENCOUNTER — TELEPHONE (OUTPATIENT)
Dept: MEDICAL GROUP | Facility: MEDICAL CENTER | Age: 41
End: 2021-12-13

## 2021-12-13 ENCOUNTER — APPOINTMENT (OUTPATIENT)
Dept: MEDICAL GROUP | Facility: MEDICAL CENTER | Age: 41
End: 2021-12-13
Payer: COMMERCIAL

## 2021-12-13 NOTE — TELEPHONE ENCOUNTER
----- Message from Yevgeniy Quintana M.D. sent at 12/11/2021  8:53 AM PST -----  Please call patient and let patient know that his heart ultrasound result is normal.    Thanks!

## 2021-12-14 ENCOUNTER — TELEPHONE (OUTPATIENT)
Dept: MEDICAL GROUP | Facility: MEDICAL CENTER | Age: 41
End: 2021-12-14

## 2021-12-14 NOTE — TELEPHONE ENCOUNTER
I called patient and got his voicemail. His VMB was full. I was not able to leave a message. Called his mom Manav and left a message with her for him to call me back.      Chloé ADRIAN  Medical assistant    ----- Message from Yevgeniy Quintana M.D. sent at 12/11/2021  8:53 AM PST -----  Please call patient and let patient know that his heart ultrasound result is normal.    Thanks!

## 2022-01-03 ENCOUNTER — APPOINTMENT (OUTPATIENT)
Dept: MEDICAL GROUP | Facility: MEDICAL CENTER | Age: 42
End: 2022-01-03
Payer: COMMERCIAL

## 2022-01-21 ENCOUNTER — OFFICE VISIT (OUTPATIENT)
Dept: MEDICAL GROUP | Facility: MEDICAL CENTER | Age: 42
End: 2022-01-21
Payer: COMMERCIAL

## 2022-01-21 VITALS
RESPIRATION RATE: 17 BRPM | DIASTOLIC BLOOD PRESSURE: 68 MMHG | SYSTOLIC BLOOD PRESSURE: 130 MMHG | BODY MASS INDEX: 27.1 KG/M2 | WEIGHT: 182.98 LBS | HEART RATE: 79 BPM | OXYGEN SATURATION: 97 % | HEIGHT: 69 IN | TEMPERATURE: 98.2 F

## 2022-01-21 DIAGNOSIS — R74.01 TRANSAMINITIS: ICD-10-CM

## 2022-01-21 DIAGNOSIS — F10.20 ALCOHOL USE DISORDER, SEVERE, DEPENDENCE (HCC): ICD-10-CM

## 2022-01-21 PROCEDURE — 99214 OFFICE O/P EST MOD 30 MIN: CPT | Performed by: INTERNAL MEDICINE

## 2022-01-21 RX ORDER — FAMOTIDINE 40 MG/1
TABLET, FILM COATED ORAL
COMMUNITY
Start: 2021-12-23 | End: 2022-09-07

## 2022-01-21 ASSESSMENT — PATIENT HEALTH QUESTIONNAIRE - PHQ9: CLINICAL INTERPRETATION OF PHQ2 SCORE: 0

## 2022-01-21 ASSESSMENT — FIBROSIS 4 INDEX: FIB4 SCORE: 2.14

## 2022-01-22 NOTE — ASSESSMENT & PLAN NOTE
1/10/22 , , total bilirubin 1.4  albumin 3.7  Elevated AST, ALT, ALP related to his alcohol use  Due to his worsening liver function, and experiencing alcohol withdrawal symptoms when he tries to quit, recommend patient to go to ER and possible get admitted for alcohol withdrawals symptoms and alcohol detox.       Extensive education and counseling provided for alcohol cessation  Recommend patient to go to AA,  contact information of several Alcohol abuse treatment centers given to patient previously    Alcohol abstinence   Prevention and treatment of alcohol withdrawal  Fluid management  Nutritional support  Infection surveillance  Prophylaxis against gastric mucosal bleeding    Hepatitis B/C panel negative in August 2021

## 2022-01-22 NOTE — PROGRESS NOTES
Established Patient    Adis Chance is a 41 y.o. male who presents today with the following:    CC:   Chief Complaint   Patient presents with   • Follow-Up       HPI:       Problem   Alcohol Use Disorder, Severe, Dependence (Hcc)    Chronic alcohol use, he has been trying to cut down on alcohol without success  He drinks a couple of drinks per day.   He experiences withdrawal symptoms after he stops drinking and restart drinking alcohol again.   His liver enzymes are getting more elevated.   1/10/22 , , total bilirubin 1.4  albumin 3.7  He experiences withdrawal symptoms  such as anxiety, tremors, shaking, nausea. Due to his alcohol withdrawal symptoms and elevated liver enzymes.     Recommend patient to go to , Alcohol abuse treatment center contact information given to patient previously    Recommend patient to go to ER and get admitted for alcohol detoxification         Transaminitis    1/10/22 , , total bilirubin 1.4  albumin 3.7  Elevated AST, ALT, ALP related to his alcohol use  Due to his worsening liver function, and experiencing alcohol withdrawal symptoms when he tries to quit, recommend patient to go to ER and possible get admitted for alcohol withdrawals symptoms and alcohol detox.       Extensive education and counseling provided for alcohol cessation  Recommend patient to go to ,  contact information of several Alcohol abuse treatment centers given to patient previously    Alcohol abstinence   Prevention and treatment of alcohol withdrawal  Fluid management  Nutritional support  Infection surveillance  Prophylaxis against gastric mucosal bleeding    Hepatitis B/C panel negative in August 2021              Current Outpatient Medications   Medication Sig Dispense Refill   • famotidine (PEPCID) 40 MG Tab      • levothyroxine (SYNTHROID) 88 MCG Tab Take 1 Tablet by mouth every morning on an empty stomach. 30 Tablet 2   • levothyroxine (SYNTHROID) 88 MCG  "Tab Take 1 Tablet by mouth every morning on an empty stomach. 30 Tablet 11   • ferrous sulfate 325 (65 Fe) MG tablet Take 1 Tablet by mouth every day. 90 Tablet 1   • cloNIDine (CATAPRES) 0.3 MG Tab Take 1 tablet by mouth 3 times a day. 90 tablet 3   • dicyclomine (BENTYL) 10 MG Cap TAKE 1 TABLET BY MOUTH EVERY 6 HOURS AS NEEDED FOR ABDOMINAL CRAMPS AND/OR DISCOMFORT     • pantoprazole (PROTONIX) 40 MG Tablet Delayed Response Take 40 mg by mouth every day.     • ondansetron (ZOFRAN) 4 MG Tab tablet Take 4 mg by mouth every four hours as needed.       No current facility-administered medications for this visit.       Allergies, past medical history, past surgical history, medications, family history, social history reviewed and updated.    ROS   Constitutional: Denies fevers or chills  Eyes: Denies changes in vision  Ears/Nose/Throat/Mouth: Denies nasal congestion or sore throat   Cardiovascular: Denies chest pain or palpitations   Respiratory: Denies shortness of breath , Denies cough  Gastrointestinal/Hepatic: nausea Denies abd pain, vomiting   Genitourinary: Denies dysuria or frequency  Musculoskeletal/Rheum: Denies joint pain and swelling   Neurological: Denies headache  Psychiatric: somewhat anxious  Endocrine: Denies hx of diabetes or thyroid dysfunction  Heme/Oncology/Lymph Nodes: Denies weight changes or enlarged LNs.    Physical Exam  Vitals: /68 (BP Location: Left arm, Patient Position: Sitting, BP Cuff Size: Adult)   Pulse 79   Temp 36.8 °C (98.2 °F)   Resp 17   Ht 1.753 m (5' 9\")   Wt 83 kg (182 lb 15.7 oz)   SpO2 97%   BMI 27.02 kg/m²   General: Alert, pleasant, NAD  HEENT: Normocephalic.  EOMI, no icterus or pallor.  Conjunctivae and lids normal. External ears normal. Wearing a mask. Oropharynx non-erythematous, mucous membranes moist.  Neck supple.  No thyromegaly or masses palpated.   Lymph: No cervical or supraclavicular lymphadenopathy.  Cardiovascular: Regular rate and rhythm. "   Respiratory: Normal respiratory effort.  Clear to auscultation bilaterally.  Abdomen: Non-distended, soft, mild epigastric tenderness   Skin: Warm, dry, no rashes.  Musculoskeletal: Gait is normal.  Moves all extremities well.  Extremities: No leg edema.  Radial pulses 2+ symmetric.   Psych:  Affect/mood is anxiety, judgement is good, memory is intact, grooming is appropriate.      Labs (1/10/2022) were reviewed and discussed with patients.  All questions were answered.      Assessment and Plan    1. Alcohol use disorder, severe, dependence (HCC)  2. Transaminitis  - US-RUQ; Future    1/10/22 , , total bilirubin 1.4  albumin 3.7  Elevated AST, ALT, ALP related to his alcohol use      Extensive education and counseling provided for alcohol cessation  Recommend patient to go to AA,  contact information of several Alcohol abuse treatment centers given to patient previously  Alcohol abstinence   Prevention and treatment of alcohol withdrawal  Fluid management  Nutritional support  Infection surveillance  Prophylaxis against gastric mucosal bleeding    Hepatitis B/C panel negative in August 2021    Due to his worsening liver function, and experiencing alcohol withdrawal symptoms when he tries to quit, recommend patient to go to ER and possibly get admitted for alcohol withdrawals symptoms and alcohol detox.         Follow-up:Return if symptoms worsen or fail to improve.    This note was created using voice recognition software. There may be unintended errors in spelling, grammar or content.

## 2022-01-22 NOTE — ASSESSMENT & PLAN NOTE
Chronic alcohol use, he has been trying to cut down on alcohol without success  He drinks a couple of drinks per day.   He experiences withdrawal symptoms after he stops drinking and restart drinking alcohol again.   His liver enzymes are getting more elevated.   1/10/22 , , total bilirubin 1.4  albumin 3.7  He experiences withdrawal symptoms  such as anxiety, tremors, shaking, nausea. Due to his alcohol withdrawal symptoms and elevated liver enzymes.     Recommend patient to go to AA, Alcohol abuse treatment center contact information given to patient previously    Recommend patient to go to ER and get admitted for alcohol detoxification

## 2022-02-01 ENCOUNTER — HOSPITAL ENCOUNTER (OUTPATIENT)
Dept: RADIOLOGY | Facility: MEDICAL CENTER | Age: 42
End: 2022-02-01
Attending: INTERNAL MEDICINE
Payer: COMMERCIAL

## 2022-02-01 DIAGNOSIS — R74.01 TRANSAMINITIS: ICD-10-CM

## 2022-02-01 PROCEDURE — 76705 ECHO EXAM OF ABDOMEN: CPT

## 2022-02-04 DIAGNOSIS — D50.0 IRON DEFICIENCY ANEMIA DUE TO CHRONIC BLOOD LOSS: ICD-10-CM

## 2022-02-04 RX ORDER — FERROUS SULFATE 325(65) MG
325 TABLET ORAL
Qty: 90 TABLET | Refills: 1 | Status: SHIPPED | OUTPATIENT
Start: 2022-02-04

## 2022-02-05 DIAGNOSIS — E03.9 ACQUIRED HYPOTHYROIDISM: ICD-10-CM

## 2022-02-07 RX ORDER — LEVOTHYROXINE SODIUM 88 UG/1
TABLET ORAL
Qty: 30 TABLET | Refills: 11 | Status: SHIPPED | OUTPATIENT
Start: 2022-02-07 | End: 2022-04-28

## 2022-02-07 NOTE — TELEPHONE ENCOUNTER
Received request via: Pharmacy    Was the patient seen in the last year in this department?yes  Does the patient have an active prescription (recently filled or refills available) for medication(s) requested? No

## 2022-03-21 DIAGNOSIS — F41.9 ANXIETY AND DEPRESSION: ICD-10-CM

## 2022-03-21 DIAGNOSIS — F32.A ANXIETY AND DEPRESSION: ICD-10-CM

## 2022-03-21 DIAGNOSIS — F10.20 ALCOHOL USE DISORDER, SEVERE, DEPENDENCE (HCC): ICD-10-CM

## 2022-03-22 NOTE — PROGRESS NOTES
Anxiety and depression  -     Referral to Psychiatry    Alcohol use disorder, severe, dependence (HCC)  -     Referral to Psychiatry

## 2022-03-23 DIAGNOSIS — F95.2 TOURETTE DISORDER: ICD-10-CM

## 2022-03-23 RX ORDER — CLONIDINE HYDROCHLORIDE 0.3 MG/1
TABLET ORAL
Qty: 90 TABLET | Refills: 3 | Status: SHIPPED | OUTPATIENT
Start: 2022-03-23 | End: 2022-07-26 | Stop reason: SDUPTHER

## 2022-04-19 ENCOUNTER — TELEPHONE (OUTPATIENT)
Dept: MEDICAL GROUP | Facility: MEDICAL CENTER | Age: 42
End: 2022-04-19

## 2022-04-19 NOTE — TELEPHONE ENCOUNTER
Phone Number Called: [phone] (home)       Call outcome: Left detailed message for patient. Informed to call back with any additional questions.    Message: LVM with Pt to call back and reschedule TS

## 2022-04-28 ENCOUNTER — OFFICE VISIT (OUTPATIENT)
Dept: MEDICAL GROUP | Facility: MEDICAL CENTER | Age: 42
End: 2022-04-28
Payer: COMMERCIAL

## 2022-04-28 VITALS
SYSTOLIC BLOOD PRESSURE: 149 MMHG | OXYGEN SATURATION: 99 % | DIASTOLIC BLOOD PRESSURE: 98 MMHG | TEMPERATURE: 99.1 F | WEIGHT: 178.57 LBS | HEART RATE: 76 BPM | BODY MASS INDEX: 26.45 KG/M2 | RESPIRATION RATE: 16 BRPM | HEIGHT: 69 IN

## 2022-04-28 DIAGNOSIS — R74.01 TRANSAMINITIS: ICD-10-CM

## 2022-04-28 DIAGNOSIS — E03.9 ACQUIRED HYPOTHYROIDISM: ICD-10-CM

## 2022-04-28 DIAGNOSIS — R21 RASH: ICD-10-CM

## 2022-04-28 DIAGNOSIS — L72.9 SUBCUTANEOUS CYST: ICD-10-CM

## 2022-04-28 DIAGNOSIS — G44.89 OTHER HEADACHE SYNDROME: ICD-10-CM

## 2022-04-28 DIAGNOSIS — F10.20 ALCOHOL USE DISORDER, SEVERE, DEPENDENCE (HCC): ICD-10-CM

## 2022-04-28 DIAGNOSIS — Z13.6 SCREENING FOR CARDIOVASCULAR CONDITION: ICD-10-CM

## 2022-04-28 DIAGNOSIS — D50.0 IRON DEFICIENCY ANEMIA DUE TO CHRONIC BLOOD LOSS: ICD-10-CM

## 2022-04-28 PROCEDURE — 99214 OFFICE O/P EST MOD 30 MIN: CPT | Performed by: INTERNAL MEDICINE

## 2022-04-28 RX ORDER — LANSOPRAZOLE 30 MG/1
CAPSULE, DELAYED RELEASE ORAL
COMMUNITY
Start: 2022-04-01

## 2022-04-28 RX ORDER — LEVOTHYROXINE SODIUM 88 UG/1
88 TABLET ORAL
Qty: 90 TABLET | Refills: 2 | Status: SHIPPED | OUTPATIENT
Start: 2022-04-28 | End: 2022-05-31 | Stop reason: SDUPTHER

## 2022-04-28 ASSESSMENT — FIBROSIS 4 INDEX: FIB4 SCORE: 2.14

## 2022-04-28 NOTE — ASSESSMENT & PLAN NOTE
Chronic headache, right side, twice a week, lasting for hours  Better with dark, quiet environment, resting  Worse bright light, noise  Associate nausea  On tylenol as needed

## 2022-04-28 NOTE — ASSESSMENT & PLAN NOTE
Chronic alcohol use, he has been trying to cut down on alcohol without success  He drinks a couple of drinks per day.   He experiences withdrawal symptoms after he stops drinking and restart drinking alcohol again.   His liver enzymes are getting more elevated.   1/10/22 , , total bilirubin 1.4  albumin 3.7    Recommend patient to go to AA, Alcohol abuse treatment center contact information given to patient previously

## 2022-04-28 NOTE — PROGRESS NOTES
Established Patient    Adis Chance is a 41 y.o. male who presents today with the following:    CC:   Chief Complaint   Patient presents with   • Follow-Up   • Rash     Left leg x1 month    • Headache     Daily needs advise for what meds to take for headaches   • Bump     Neck x1 year       HPI:       Problem   Subcutaneous Cyst    Subcutaneous cyst for several years  Gradually getting bigger     Other Headache Syndrome    Chronic headache, right side, twice a week, lasting for hours  Better with dark, quiet environment, resting  Worse bright light, noise  Associate nausea  On tylenol as needed     Alcohol Use Disorder, Severe, Dependence (Hcc)    Chronic alcohol use, he has been trying to cut down on alcohol without success  He drinks a couple of drinks per day.   He experiences withdrawal symptoms after he stops drinking and restart drinking alcohol again.   His liver enzymes are getting more elevated.   1/10/22 , , total bilirubin 1.4  albumin 3.7    Recommend patient to go to , Alcohol abuse treatment center contact information given to patient previously         Transaminitis    1/10/22 , , total bilirubin 1.4  albumin 3.7  Elevated AST, ALT, ALP related to his alcohol use        Extensive education and counseling provided for alcohol cessation  Recommend patient to go to ,  contact information of several Alcohol abuse treatment centers given to patient previously    Alcohol abstinence   Prevention and treatment of alcohol withdrawal  Fluid management  Nutritional support  Infection surveillance  Prophylaxis against gastric mucosal bleeding    Hepatitis B/C panel negative in August 2021         Iron Deficiency Anemia Due to Chronic Blood Loss    Hx of intermittent rectal bleeding from hemorrhoid. Following with GI on PPI and famotidine. Follow with GI  No rectal bleeding today.     4/11/2018 EGD normal esophagus.  Normal mucosa in the second part of duodenum  in duodenal bulb.  Erythema in the stomach antrum and stomach body.  Colonoscopy: External hemorrhoids.  Grade 4 internal hemorrhoids.  Normal mucosa in the whole colon.      Chronic hx of iron deficiency anemia due to hemorrhoids. Every other month he would have exacerbation of symptoms.  Recommend patient not to overstrain when having BM. However, his OCD is making him hard to stop keep pushing even after all of BM has been passed.Pending appointment with behavioral medicine to OCD.     Stop alcohol use.   PO iron with Vitamin C daily  Seek medical attention immediately for rectal bleeding.        Rash    Left shin erythema          Current Outpatient Medications   Medication Sig Dispense Refill   • lansoprazole (PREVACID) 30 MG CAPSULE DELAYED RELEASE TAKE 1 CAPSULE BY MOUTH TWICE A DAY 30 MONUTES BEFORE BREAKFAST AND 30 MINUTES BEFORE DINNER     • levothyroxine (SYNTHROID) 88 MCG Tab Take 1 Tablet by mouth every morning on an empty stomach. 90 Tablet 2   • cloNIDine (CATAPRES) 0.3 MG Tab TAKE ONE TABLET BY MOUTH THREE TIMES A DAY 90 Tablet 3   • ferrous sulfate 325 (65 Fe) MG tablet TAKE 1 TABLET BY MOUTH EVERY DAY 90 Tablet 1   • famotidine (PEPCID) 40 MG Tab      • dicyclomine (BENTYL) 10 MG Cap TAKE 1 TABLET BY MOUTH EVERY 6 HOURS AS NEEDED FOR ABDOMINAL CRAMPS AND/OR DISCOMFORT     • ondansetron (ZOFRAN) 4 MG Tab tablet Take 4 mg by mouth every four hours as needed.       No current facility-administered medications for this visit.       Allergies, past medical history, past surgical history, medications, family history, social history reviewed and updated.    ROS   Constitutional: Denies fevers or chills  Eyes: Denies changes in vision  Ears/Nose/Throat/Mouth: Denies nasal congestion or sore throat   Cardiovascular: Denies chest pain or palpitations   Respiratory: Denies shortness of breath , Denies cough  Gastrointestinal/Hepatic: Denies abd pain, nausea, vomiting   Genitourinary: Denies dysuria or  "frequency  Musculoskeletal/Rheum: Denies joint pain and swelling   Neurological: intermittent headache  Psychiatric: mood stable  Endocrine: Denies hx of diabetes or thyroid dysfunction  Heme/Oncology/Lymph Nodes: Denies weight changes or enlarged LNs.    Physical Exam  Vitals: /98 (BP Location: Left arm, Patient Position: Sitting, BP Cuff Size: Adult)   Pulse 76   Temp 37.3 °C (99.1 °F) (Temporal)   Resp 16   Ht 1.753 m (5' 9\")   Wt 81 kg (178 lb 9.2 oz)   SpO2 99%   BMI 26.37 kg/m²   General: Alert, pleasant, NAD  HEENT: Normocephalic.  EOMI, no icterus or pallor.  Conjunctivae and lids normal. External ears normal. Wearing a mask. Oropharynx non-erythematous, mucous membranes moist.  Neck supple.  No thyromegaly or masses palpated.   Lymph: No cervical or supraclavicular lymphadenopathy.  Cardiovascular: Regular rate and rhythm.    Respiratory: Normal respiratory effort.  Clear to auscultation bilaterally.  Abdomen: Non-distended, soft, non-tender  Skin: Warm, dry, left shin erythematous plaque with excoriation  Musculoskeletal: Gait is normal.  Moves all extremities well.  Extremities: No leg edema.    Psych:  Affect/mood is normal, judgement is good, memory is intact, grooming is appropriate.      Assessment and Plan    1. Acquired hypothyroidism  - levothyroxine (SYNTHROID) 88 MCG Tab; Take 1 Tablet by mouth every morning on an empty stomach.  Dispense: 90 Tablet; Refill: 2  - TSH WITH REFLEX TO FT4; Future    2. Iron deficiency anemia due to chronic blood loss  - CBC WITH DIFFERENTIAL; Future  - FERRITIN; Future  - IRON/TOTAL IRON BIND; Future  Hx of intermittent rectal bleeding from hemorrhoid. Following with GI on PPI and famotidine. Follow with GI    On iron with vitamin C  Following with GI    3. Transaminitis  - Comp Metabolic Panel; Future  1/10/22 , , total bilirubin 1.4  albumin 3.7  Elevated AST, ALT, ALP related to his alcohol use    Extensive education and " counseling provided for alcohol cessation  Recommend patient to go to AA,  contact information of several Alcohol abuse treatment centers given to patient previously    Alcohol abstinence   Prevention and treatment of alcohol withdrawal  Fluid management  Nutritional support  Infection surveillance  Prophylaxis against gastric mucosal bleeding    Hepatitis B/C panel negative in August 2021      4. Subcutaneous cyst  - Referral to Dermatology    5. Rash  - Referral to Dermatology         Continue Hydrocortisone  Avoid in contact with Irritants (excessive soaps, detergents).   Avoid scratching  Keep tepid warm water when bathing  To maintain skin hydration, non-irritating emollients (such as petroleum jelly) should be applied at least two times per day and immediately after bathing or hand washing.    6. Other headache syndrome  - Referral to Neurology    7. Screening for cardiovascular condition  - Lipid Profile; Future  Adis was seen today for follow-up, rash, headache and bump.    8. Alcohol use disorder, severe, dependence (HCC)  Education and counseling provided for cutting down and quit on alcohol         Follow-up:Return in about 1 month (around 5/28/2022), or if symptoms worsen or fail to improve.    This note was created using voice recognition software. There may be unintended errors in spelling, grammar or content.

## 2022-04-28 NOTE — ASSESSMENT & PLAN NOTE
1/10/22 , , total bilirubin 1.4  albumin 3.7  Elevated AST, ALT, ALP related to his alcohol use        Extensive education and counseling provided for alcohol cessation  Recommend patient to go to AA,  contact information of several Alcohol abuse treatment centers given to patient previously    Alcohol abstinence   Prevention and treatment of alcohol withdrawal  Fluid management  Nutritional support  Infection surveillance  Prophylaxis against gastric mucosal bleeding    Hepatitis B/C panel negative in August 2021

## 2022-05-31 ENCOUNTER — OFFICE VISIT (OUTPATIENT)
Dept: MEDICAL GROUP | Facility: PHYSICIAN GROUP | Age: 42
End: 2022-05-31
Payer: COMMERCIAL

## 2022-05-31 VITALS
TEMPERATURE: 98.1 F | HEART RATE: 95 BPM | BODY MASS INDEX: 25.53 KG/M2 | WEIGHT: 178.35 LBS | HEIGHT: 70 IN | RESPIRATION RATE: 16 BRPM | SYSTOLIC BLOOD PRESSURE: 136 MMHG | OXYGEN SATURATION: 99 % | DIASTOLIC BLOOD PRESSURE: 85 MMHG

## 2022-05-31 DIAGNOSIS — E03.9 ACQUIRED HYPOTHYROIDISM: ICD-10-CM

## 2022-05-31 DIAGNOSIS — F41.9 ANXIETY AND DEPRESSION: ICD-10-CM

## 2022-05-31 DIAGNOSIS — K58.2 IRRITABLE BOWEL SYNDROME WITH BOTH CONSTIPATION AND DIARRHEA: ICD-10-CM

## 2022-05-31 DIAGNOSIS — D50.0 IRON DEFICIENCY ANEMIA DUE TO CHRONIC BLOOD LOSS: ICD-10-CM

## 2022-05-31 DIAGNOSIS — R74.01 TRANSAMINITIS: ICD-10-CM

## 2022-05-31 DIAGNOSIS — F10.20 ALCOHOL USE DISORDER, SEVERE, DEPENDENCE (HCC): ICD-10-CM

## 2022-05-31 DIAGNOSIS — K21.9 GASTROESOPHAGEAL REFLUX DISEASE, UNSPECIFIED WHETHER ESOPHAGITIS PRESENT: ICD-10-CM

## 2022-05-31 DIAGNOSIS — R03.0 ELEVATED BLOOD PRESSURE READING WITHOUT DIAGNOSIS OF HYPERTENSION: ICD-10-CM

## 2022-05-31 DIAGNOSIS — F42.2 MIXED OBSESSIONAL THOUGHTS AND ACTS: ICD-10-CM

## 2022-05-31 DIAGNOSIS — L72.9 SUBCUTANEOUS CYST: ICD-10-CM

## 2022-05-31 DIAGNOSIS — F95.2 TOURETTE DISORDER: ICD-10-CM

## 2022-05-31 DIAGNOSIS — F32.A ANXIETY AND DEPRESSION: ICD-10-CM

## 2022-05-31 DIAGNOSIS — E03.9 HYPOTHYROIDISM, UNSPECIFIED TYPE: ICD-10-CM

## 2022-05-31 DIAGNOSIS — K64.9 HEMORRHOIDS, UNSPECIFIED HEMORRHOID TYPE: ICD-10-CM

## 2022-05-31 PROBLEM — D64.9 ANEMIA: Status: RESOLVED | Noted: 2019-06-07 | Resolved: 2022-05-31

## 2022-05-31 PROBLEM — K62.5 RECTAL BLEEDING: Status: RESOLVED | Noted: 2017-06-15 | Resolved: 2022-05-31

## 2022-05-31 PROBLEM — D75.89 MACROCYTOSIS WITHOUT ANEMIA: Status: RESOLVED | Noted: 2019-11-21 | Resolved: 2022-05-31

## 2022-05-31 PROCEDURE — 99214 OFFICE O/P EST MOD 30 MIN: CPT | Performed by: STUDENT IN AN ORGANIZED HEALTH CARE EDUCATION/TRAINING PROGRAM

## 2022-05-31 RX ORDER — LEVOTHYROXINE SODIUM 112 UG/1
112 TABLET ORAL
Qty: 90 TABLET | Refills: 0 | Status: SHIPPED | OUTPATIENT
Start: 2022-05-31 | End: 2022-08-30

## 2022-05-31 ASSESSMENT — ENCOUNTER SYMPTOMS
BLOOD IN STOOL: 1
SHORTNESS OF BREATH: 0
INSOMNIA: 1
DIZZINESS: 0
NERVOUS/ANXIOUS: 1
NAUSEA: 1
FEVER: 0
DIARRHEA: 1
CONSTIPATION: 1
HEADACHES: 0
ABDOMINAL PAIN: 1

## 2022-05-31 ASSESSMENT — FIBROSIS 4 INDEX: FIB4 SCORE: 2.19

## 2022-05-31 NOTE — ASSESSMENT & PLAN NOTE
Continue following with GI and surgery.  Continue to avoid long periods, toilet and excessive pushing.  Chronic condition, well-controlled.

## 2022-05-31 NOTE — ASSESSMENT & PLAN NOTE
Advised him to discontinue using his father's metoprolol.  We will bring him back in 4 weeks for a blood pressure check and have given him a blood pressure log to check twice a day.

## 2022-05-31 NOTE — PROGRESS NOTES
HISTORY OF PRESENT ILLNESS: Adis is a pleasant 42 y.o. male, new  patient who presents today to discuss medical problems as listed below:    Problem   Ibs (Irritable Bowel Syndrome)    Reports irregular bowel movements, intermittent diarrhea and abdominal pain.  Currently not well controlled.  Currently taking Bentyl 10 mg capsules.  Also on lansoprazole     Elevated Blood Pressure Reading Without Diagnosis of Hypertension    Patient reports he has elevated blood pressures at home, no formal diagnosis.  Reports that he does take clonidine daily for Tourette's and tic disorder which normally helps it.  He reports that sometimes he takes his blood pressure and it is high so he uses his father's metoprolol.     Subcutaneous Cyst    Subcutaneous cyst for several years  Gradually getting bigger     Alcohol Use Disorder, Severe, Dependence (Hcc)    Chronic alcohol use, he has been trying to cut down on alcohol without success  He drinks a couple of drinks per day.   He experiences withdrawal symptoms after he stops drinking and restart drinking alcohol again.     Evidence of hepatic steatosis right upper quadrant ultrasound 2/2022.     Transaminitis    1/10/22 , , total bilirubin 1.4  albumin 3.7  Elevated AST, ALT, ALP related to his alcohol use    Hepatitis B/C panel negative in August 2021         Iron Deficiency Anemia Due to Chronic Blood Loss    Last CBC shows hemoglobin 15.6, MCV 95.3. Hx of intermittent rectal bleeding from hemorrhoid. Following with GI on PPI and famotidine. Follow with GI    4/11/2018 EGD normal esophagus.  Normal mucosa in the second part of duodenum in duodenal bulb.  Erythema in the stomach antrum and stomach body.  Colonoscopy: External hemorrhoids.  Grade 4 internal hemorrhoids.  Normal mucosa in the whole colon.       Hemorrhoids    Hx of intermittent rectal bleeding from hemorrhoid.   Currently no overt bleeding.    Hydration, high fiber, squatty  potty      4/11/2018 EGD normal esophagus.  Normal mucosa in the second part of duodenum in duodenal bulb.  Erythema in the stomach antrum and stomach body.  Colonoscopy: External hemorrhoids.  Grade 4 internal hemorrhoids.  Normal mucosa in the whole colon.    Follow with GI and surgery       Gerd (Gastroesophageal Reflux Disease)    Chronic reflex. On PPI. Hx of anemia. Following with GI.    4/11/2018 EGD normal esophagus.  Normal mucosa in the second part of duodenum in duodenal bulb.  Erythema in the stomach antrum and stomach body.      Hypothyroidism    Last TSH 7.07 on 5/27/2022.  Elevated.  Free T4 0.75.    Reports excessive fatigue, slowness, cold intolerance.     Tourette Disorder    Chronic hx of Tourette disorder. Currently on clonidine 0.3 mg TID. Does not see psych or neurology.      Macrocytosis Without Anemia (Resolved)       Ref. Range 11/11/2019 10:27   WBC Latest Ref Range: 4.8 - 10.8 K/uL 4.6 (L)   RBC Latest Ref Range: 4.70 - 6.10 M/uL 4.04 (L)   Hemoglobin Latest Ref Range: 14.0 - 18.0 g/dL 14.0   Hematocrit Latest Ref Range: 42.0 - 52.0 % 41.9 (L)   MCV Latest Ref Range: 81.4 - 97.8 fL 103.7 (H)   MCH Latest Ref Range: 27.0 - 33.0 pg 34.7 (H)   MCHC Latest Ref Range: 33.7 - 35.3 g/dL 33.4 (L)   RDW Latest Ref Range: 35.9 - 50.0 fL 53.7 (H)   Platelet Count Latest Ref Range: 164 - 446 K/uL 282   MPV Latest Ref Range: 9.0 - 12.9 fL 9.5   Education and counseling provided for cutting down on alcohol  Check B12 and folate  Control his hypothyroidism     Anemia (Resolved)    Hx of intermittent rectal bleeding from hemorrhoid. Following with GI.    no rectal bleeding today.    Ref. Range 6/7/2019 15:05   Hemoglobin Latest Ref Range: 14.0 - 18.0 g/dL 11.5 (L)   Hematocrit Latest Ref Range: 42.0 - 52.0 % 38.7 (L)   MCV Latest Ref Range: 81.4 - 97.8 fL 86.6      Ref. Range 6/7/2019 15:05   Ferritin Latest Ref Range: 22.0 - 322.0 ng/mL 10.7 (L)     4/11/2018 EGD normal esophagus.  Normal mucosa in the  second part of duodenum in duodenal bulb.  Erythema in the stomach antrum and stomach body.  Colonoscopy: External hemorrhoids.  Grade 4 internal hemorrhoids.  Normal mucosa in the whole colon.  Patient was referred to [facility] surgical associate for the hemorrhoids.  By Dr. Starks GI consultant     Rectal Bleeding (Resolved)    Intermittent rectal bleeding from severe internal and external hemorrhoid. Follow with GI. Per GI, patient needs to see surgery. Referral to surgery placed today  Seek medical attention immediately for rectal bleeding            Current Outpatient Medications Ordered in Epic   Medication Sig Dispense Refill   • levothyroxine (SYNTHROID) 112 MCG Tab Take 1 Tablet by mouth every morning on an empty stomach for 90 days. 90 Tablet 0   • lansoprazole (PREVACID) 30 MG CAPSULE DELAYED RELEASE TAKE 1 CAPSULE BY MOUTH TWICE A DAY 30 MONUTES BEFORE BREAKFAST AND 30 MINUTES BEFORE DINNER     • cloNIDine (CATAPRES) 0.3 MG Tab TAKE ONE TABLET BY MOUTH THREE TIMES A DAY 90 Tablet 3   • ferrous sulfate 325 (65 Fe) MG tablet TAKE 1 TABLET BY MOUTH EVERY DAY 90 Tablet 1   • famotidine (PEPCID) 40 MG Tab      • dicyclomine (BENTYL) 10 MG Cap TAKE 1 TABLET BY MOUTH EVERY 6 HOURS AS NEEDED FOR ABDOMINAL CRAMPS AND/OR DISCOMFORT     • ondansetron (ZOFRAN) 4 MG Tab tablet Take 4 mg by mouth every four hours as needed.       No current Epic-ordered facility-administered medications on file.       Review of Systems   Constitutional: Negative for fever.   Respiratory: Negative for shortness of breath.    Cardiovascular: Negative for chest pain.   Gastrointestinal: Positive for abdominal pain, blood in stool, constipation, diarrhea and nausea.   Neurological: Negative for dizziness and headaches.   Psychiatric/Behavioral: The patient is nervous/anxious and has insomnia.         Past Medical History:   Diagnosis Date   • GERD (gastroesophageal reflux disease)    • IBS (irritable bowel syndrome)    • OCD  "(obsessive compulsive disorder)      Past Surgical History:   Procedure Laterality Date   • APPENDECTOMY  2018     Social History     Tobacco Use   • Smoking status: Never Smoker   • Smokeless tobacco: Never Used   Vaping Use   • Vaping Use: Never used   Substance Use Topics   • Alcohol use: Not Currently     Alcohol/week: 12.6 oz     Types: 21 Standard drinks or equivalent per week     Comment: A few drinks a night   • Drug use: No      Family History   Problem Relation Age of Onset   • Stroke Father    • Myasthenia gravis Father    • No Known Problems Sister    • No Known Problems Brother      Current Outpatient Medications   Medication Sig Dispense Refill   • levothyroxine (SYNTHROID) 112 MCG Tab Take 1 Tablet by mouth every morning on an empty stomach for 90 days. 90 Tablet 0   • lansoprazole (PREVACID) 30 MG CAPSULE DELAYED RELEASE TAKE 1 CAPSULE BY MOUTH TWICE A DAY 30 MONUTES BEFORE BREAKFAST AND 30 MINUTES BEFORE DINNER     • cloNIDine (CATAPRES) 0.3 MG Tab TAKE ONE TABLET BY MOUTH THREE TIMES A DAY 90 Tablet 3   • ferrous sulfate 325 (65 Fe) MG tablet TAKE 1 TABLET BY MOUTH EVERY DAY 90 Tablet 1   • famotidine (PEPCID) 40 MG Tab      • dicyclomine (BENTYL) 10 MG Cap TAKE 1 TABLET BY MOUTH EVERY 6 HOURS AS NEEDED FOR ABDOMINAL CRAMPS AND/OR DISCOMFORT     • ondansetron (ZOFRAN) 4 MG Tab tablet Take 4 mg by mouth every four hours as needed.       No current facility-administered medications for this visit.       Allergies:  Allergies   Allergen Reactions   • Sumatriptan      Dizziness, sweating, rapid heartbeat       Allergies, past medical history, past surgical history, family history, social history reviewed and updated.    Objective:    /85   Pulse 95   Temp 36.7 °C (98.1 °F) (Temporal)   Resp 16   Ht 1.778 m (5' 10\")   Wt 80.9 kg (178 lb 5.6 oz)   SpO2 99%   BMI 25.59 kg/m²    Body mass index is 25.59 kg/m².    Physical Exam  Constitutional:       General: He is not in acute distress.     " Appearance: He is not ill-appearing.   Cardiovascular:      Rate and Rhythm: Normal rate and regular rhythm.      Pulses: Normal pulses.      Heart sounds: Normal heart sounds.   Pulmonary:      Effort: Pulmonary effort is normal. No respiratory distress.      Breath sounds: Normal breath sounds. No wheezing.   Abdominal:      General: Abdomen is flat. Bowel sounds are normal.      Palpations: Abdomen is soft.   Musculoskeletal:      Right lower leg: No edema.      Left lower leg: No edema.   Neurological:      General: No focal deficit present.      Mental Status: He is alert.   Psychiatric:         Attention and Perception: Attention normal.         Mood and Affect: Affect is blunt.         Speech: Speech is delayed.         Behavior: Behavior normal.         Thought Content: Thought content normal.         Cognition and Memory: Cognition and memory normal.         Judgment: Judgment normal.            Assessment/Plan:    Patient here for a preventive medicine visit today and to establish care.  -Reviewed all past medical history, family history, social history    -Diet and exercise appropriate counseling given  -Social determinants of health reviewed  -Tobacco, alcohol, recreational drug use: Discussed alcohol use disorder.  -Cholesterol screening: Done on 5/27/2022, , total cholesterol 204.  -Diabetes screening: not indicated  -Blood pressure: 136/85    Immunizations:  Immunizations: up-to date, advised on booster for covid.    Cancer screenings:  Colorectal cancer screening: no fam hx of colon cancer, sees GI for hemorrhoids.       Problem List Items Addressed This Visit     Tourette disorder     Continue clonidine 0.3 mg 3 times daily.  I did put a referral in for psychiatry to manage this as well as his other psychiatric issues.  Chronic condition, stable           Relevant Orders    Referral to Psychiatry    Hypothyroidism     Reviewed last TSH 7.07, still out of range.  Will increase 212 mcg daily.   Recheck 3 months.           Relevant Medications    levothyroxine (SYNTHROID) 112 MCG Tab    OCD (obsessive compulsive disorder)    Relevant Orders    Referral to Psychiatry    Iron deficiency anemia due to chronic blood loss     Secondary to hemorrhoids.  Patient reports that he is not bleeding is much as he used to anymore.  He is trying to avoid from pushing too hard while in the toilet but his OCD exacerbates this.  Chronic, stable, labs within normal limits.           Hemorrhoids     Continue following with GI and surgery.  Continue to avoid long periods, toilet and excessive pushing.  Chronic condition, well-controlled.           GERD (gastroesophageal reflux disease)     Continue Prevacid 30 mg daily.  Seeing GI.  Chronic stable condition.           Anxiety and depression    Relevant Orders    Referral to Psychiatry    Transaminitis     Right upper quadrant ultrasound February 2022, evidence of hepatic steatosis.  Recent labs show a downtrending AST and ALT.  Alkaline phosphatase within normal limits.           Alcohol use disorder, severe, dependence (HCC)     Advised to decrease alcohol use.  As he is having signs of liver steatosis.           Subcutaneous cyst     Subcutaneous cyst on his neck that is asymptomatic.  Reports that he would like to hold off on getting it removed for now.           IBS (irritable bowel syndrome)     Chronic issue currently in exacerbation but improving.  Continue Bentyl, Zofran as needed.           Elevated blood pressure reading without diagnosis of hypertension     Advised him to discontinue using his father's metoprolol.  We will bring him back in 4 weeks for a blood pressure check and have given him a blood pressure log to check twice a day.                  Return in about 2 weeks (around 6/14/2022) for blood pressure.

## 2022-05-31 NOTE — ASSESSMENT & PLAN NOTE
Continue clonidine 0.3 mg 3 times daily.  I did put a referral in for psychiatry to manage this as well as his other psychiatric issues.  Chronic condition, stable

## 2022-05-31 NOTE — ASSESSMENT & PLAN NOTE
Secondary to hemorrhoids.  Patient reports that he is not bleeding is much as he used to anymore.  He is trying to avoid from pushing too hard while in the toilet but his OCD exacerbates this.  Chronic, stable, labs within normal limits.

## 2022-05-31 NOTE — ASSESSMENT & PLAN NOTE
Subcutaneous cyst on his neck that is asymptomatic.  Reports that he would like to hold off on getting it removed for now.

## 2022-05-31 NOTE — ASSESSMENT & PLAN NOTE
Right upper quadrant ultrasound February 2022, evidence of hepatic steatosis.  Recent labs show a downtrending AST and ALT.  Alkaline phosphatase within normal limits.

## 2022-06-22 ENCOUNTER — APPOINTMENT (OUTPATIENT)
Dept: NEUROLOGY | Facility: MEDICAL CENTER | Age: 42
End: 2022-06-22
Attending: PSYCHIATRY & NEUROLOGY
Payer: COMMERCIAL

## 2022-06-29 ENCOUNTER — OFFICE VISIT (OUTPATIENT)
Dept: MEDICAL GROUP | Facility: PHYSICIAN GROUP | Age: 42
End: 2022-06-29
Payer: COMMERCIAL

## 2022-06-29 VITALS
HEIGHT: 70 IN | WEIGHT: 181.44 LBS | TEMPERATURE: 97.9 F | SYSTOLIC BLOOD PRESSURE: 132 MMHG | RESPIRATION RATE: 16 BRPM | DIASTOLIC BLOOD PRESSURE: 80 MMHG | BODY MASS INDEX: 25.98 KG/M2 | HEART RATE: 98 BPM | OXYGEN SATURATION: 100 %

## 2022-06-29 DIAGNOSIS — F32.A ANXIETY AND DEPRESSION: ICD-10-CM

## 2022-06-29 DIAGNOSIS — R03.0 ELEVATED BLOOD PRESSURE READING WITHOUT DIAGNOSIS OF HYPERTENSION: ICD-10-CM

## 2022-06-29 DIAGNOSIS — R06.02 SHORTNESS OF BREATH: ICD-10-CM

## 2022-06-29 DIAGNOSIS — F41.9 ANXIETY AND DEPRESSION: ICD-10-CM

## 2022-06-29 DIAGNOSIS — J30.2 SEASONAL ALLERGIES: ICD-10-CM

## 2022-06-29 PROCEDURE — 99214 OFFICE O/P EST MOD 30 MIN: CPT | Performed by: STUDENT IN AN ORGANIZED HEALTH CARE EDUCATION/TRAINING PROGRAM

## 2022-06-29 RX ORDER — FLUTICASONE PROPIONATE 50 MCG
1 SPRAY, SUSPENSION (ML) NASAL DAILY
Qty: 16 G | Refills: 3 | Status: SHIPPED | OUTPATIENT
Start: 2022-06-29 | End: 2022-07-28

## 2022-06-29 RX ORDER — ESCITALOPRAM OXALATE 10 MG/1
10 TABLET ORAL DAILY
Qty: 30 TABLET | Refills: 2 | Status: SHIPPED | OUTPATIENT
Start: 2022-06-29 | End: 2022-07-28

## 2022-06-29 RX ORDER — ALBUTEROL SULFATE 90 UG/1
2 AEROSOL, METERED RESPIRATORY (INHALATION) EVERY 6 HOURS PRN
Qty: 18 G | Refills: 3 | Status: SHIPPED | OUTPATIENT
Start: 2022-06-29

## 2022-06-29 ASSESSMENT — FIBROSIS 4 INDEX: FIB4 SCORE: 1.3

## 2022-06-30 NOTE — ASSESSMENT & PLAN NOTE
We will start patient on Lexapro 10 mg daily.  Return to care 3 months for follow-up.  He does have a referral in for psychiatry.  I have given him the name of the clinic and the number so that he may contact them for an appointment.

## 2022-06-30 NOTE — ASSESSMENT & PLAN NOTE
His ambulatory blood pressures are relatively well controlled.  There are about 2 readings that are in the 150-1 60 range and 1 high in the 180 range.  I wonder if this may be due to rebound hypertension from taking clonidine.  He takes his Tourette's disorder.    Plan: Continue to monitor advised patient to keep checking his blood pressures and to return to care if having persistent elevated blood pressures above 140 systolic or above 90 diastolic.  Otherwise return to care 3 to 4 months for follow-up.

## 2022-07-26 DIAGNOSIS — F95.2 TOURETTE DISORDER: ICD-10-CM

## 2022-07-26 RX ORDER — CLONIDINE HYDROCHLORIDE 0.3 MG/1
0.3 TABLET ORAL 3 TIMES DAILY
Qty: 270 TABLET | Refills: 3 | Status: SHIPPED | OUTPATIENT
Start: 2022-07-26

## 2022-07-28 DIAGNOSIS — J30.2 SEASONAL ALLERGIES: ICD-10-CM

## 2022-07-28 DIAGNOSIS — F41.9 ANXIETY AND DEPRESSION: ICD-10-CM

## 2022-07-28 DIAGNOSIS — F32.A ANXIETY AND DEPRESSION: ICD-10-CM

## 2022-07-28 RX ORDER — ESCITALOPRAM OXALATE 10 MG/1
10 TABLET ORAL DAILY
Qty: 30 TABLET | Refills: 2 | Status: SHIPPED | OUTPATIENT
Start: 2022-07-28 | End: 2022-09-07

## 2022-07-28 RX ORDER — FLUTICASONE PROPIONATE 50 MCG
1 SPRAY, SUSPENSION (ML) NASAL DAILY
Qty: 16 ML | Refills: 3 | Status: SHIPPED | OUTPATIENT
Start: 2022-07-28 | End: 2022-09-07

## 2022-08-09 DIAGNOSIS — G44.89 OTHER HEADACHE SYNDROME: ICD-10-CM

## 2022-08-09 DIAGNOSIS — F95.2 TOURETTE DISORDER: ICD-10-CM

## 2022-08-29 DIAGNOSIS — E03.9 ACQUIRED HYPOTHYROIDISM: ICD-10-CM

## 2022-08-29 DIAGNOSIS — E03.9 HYPOTHYROIDISM, UNSPECIFIED TYPE: ICD-10-CM

## 2022-08-29 NOTE — TELEPHONE ENCOUNTER
Controlled w current regimen- continue and monitor closely Received request via: Pharmacy    Was the patient seen in the last year in this department? Yes    Does the patient have an active prescription (recently filled or refills available) for medication(s) requested? No

## 2022-08-30 RX ORDER — LEVOTHYROXINE SODIUM 112 UG/1
112 TABLET ORAL
Qty: 90 TABLET | Refills: 1 | Status: SHIPPED | OUTPATIENT
Start: 2022-08-30 | End: 2023-02-01

## 2022-09-07 ENCOUNTER — OFFICE VISIT (OUTPATIENT)
Dept: MEDICAL GROUP | Facility: PHYSICIAN GROUP | Age: 42
End: 2022-09-07
Payer: COMMERCIAL

## 2022-09-07 VITALS
SYSTOLIC BLOOD PRESSURE: 138 MMHG | DIASTOLIC BLOOD PRESSURE: 90 MMHG | OXYGEN SATURATION: 97 % | HEART RATE: 83 BPM | TEMPERATURE: 97.5 F | BODY MASS INDEX: 26.2 KG/M2 | HEIGHT: 70 IN | RESPIRATION RATE: 16 BRPM | WEIGHT: 182.98 LBS

## 2022-09-07 DIAGNOSIS — F42.9 OBSESSIVE-COMPULSIVE DISORDER, UNSPECIFIED TYPE: ICD-10-CM

## 2022-09-07 DIAGNOSIS — E03.9 HYPOTHYROIDISM, UNSPECIFIED TYPE: ICD-10-CM

## 2022-09-07 DIAGNOSIS — F10.20 ALCOHOL USE DISORDER, SEVERE, DEPENDENCE (HCC): ICD-10-CM

## 2022-09-07 DIAGNOSIS — K05.6 PERIODONTAL DISEASE: ICD-10-CM

## 2022-09-07 DIAGNOSIS — F32.A ANXIETY AND DEPRESSION: ICD-10-CM

## 2022-09-07 DIAGNOSIS — F41.9 ANXIETY AND DEPRESSION: ICD-10-CM

## 2022-09-07 DIAGNOSIS — I10 PRIMARY HYPERTENSION: ICD-10-CM

## 2022-09-07 PROCEDURE — 99214 OFFICE O/P EST MOD 30 MIN: CPT | Performed by: STUDENT IN AN ORGANIZED HEALTH CARE EDUCATION/TRAINING PROGRAM

## 2022-09-07 RX ORDER — LOSARTAN POTASSIUM 25 MG/1
25 TABLET ORAL DAILY
Qty: 30 TABLET | Refills: 2 | Status: SHIPPED | OUTPATIENT
Start: 2022-09-07 | End: 2022-10-04

## 2022-09-07 ASSESSMENT — FIBROSIS 4 INDEX: FIB4 SCORE: 1.3

## 2022-09-08 ENCOUNTER — PATIENT OUTREACH (OUTPATIENT)
Dept: HEALTH INFORMATION MANAGEMENT | Facility: OTHER | Age: 42
End: 2022-09-08
Payer: COMMERCIAL

## 2022-09-08 PROBLEM — I10 PRIMARY HYPERTENSION: Status: ACTIVE | Noted: 2022-09-08

## 2022-09-08 PROBLEM — K05.6 PERIODONTAL DISEASE: Status: ACTIVE | Noted: 2022-09-08

## 2022-09-08 SDOH — ECONOMIC STABILITY: INCOME INSECURITY: IN THE LAST 12 MONTHS, WAS THERE A TIME WHEN YOU WERE NOT ABLE TO PAY THE MORTGAGE OR RENT ON TIME?: NO

## 2022-09-08 SDOH — ECONOMIC STABILITY: FOOD INSECURITY: WITHIN THE PAST 12 MONTHS, THE FOOD YOU BOUGHT JUST DIDN'T LAST AND YOU DIDN'T HAVE MONEY TO GET MORE.: NEVER TRUE

## 2022-09-08 SDOH — ECONOMIC STABILITY: TRANSPORTATION INSECURITY
IN THE PAST 12 MONTHS, HAS THE LACK OF TRANSPORTATION KEPT YOU FROM MEDICAL APPOINTMENTS OR FROM GETTING MEDICATIONS?: NO

## 2022-09-08 SDOH — ECONOMIC STABILITY: HOUSING INSECURITY
IN THE LAST 12 MONTHS, WAS THERE A TIME WHEN YOU DID NOT HAVE A STEADY PLACE TO SLEEP OR SLEPT IN A SHELTER (INCLUDING NOW)?: NO

## 2022-09-08 SDOH — ECONOMIC STABILITY: FOOD INSECURITY: WITHIN THE PAST 12 MONTHS, YOU WORRIED THAT YOUR FOOD WOULD RUN OUT BEFORE YOU GOT MONEY TO BUY MORE.: NEVER TRUE

## 2022-09-08 SDOH — HEALTH STABILITY: PHYSICAL HEALTH: ON AVERAGE, HOW MANY DAYS PER WEEK DO YOU ENGAGE IN MODERATE TO STRENUOUS EXERCISE (LIKE A BRISK WALK)?: 3 DAYS

## 2022-09-08 SDOH — HEALTH STABILITY: PHYSICAL HEALTH: ON AVERAGE, HOW MANY MINUTES DO YOU ENGAGE IN EXERCISE AT THIS LEVEL?: 30 MIN

## 2022-09-08 SDOH — ECONOMIC STABILITY: HOUSING INSECURITY: IN THE LAST 12 MONTHS, HOW MANY PLACES HAVE YOU LIVED?: 1

## 2022-09-08 SDOH — ECONOMIC STABILITY: TRANSPORTATION INSECURITY
IN THE PAST 12 MONTHS, HAS LACK OF TRANSPORTATION KEPT YOU FROM MEETINGS, WORK, OR FROM GETTING THINGS NEEDED FOR DAILY LIVING?: NO

## 2022-09-08 ASSESSMENT — LIFESTYLE VARIABLES
SKIP TO QUESTIONS 9-10: 0
HOW OFTEN DO YOU HAVE SIX OR MORE DRINKS ON ONE OCCASION: NEVER
HOW OFTEN DO YOU HAVE A DRINK CONTAINING ALCOHOL: 4 OR MORE TIMES A WEEK
AUDIT-C TOTAL SCORE: 5
HOW MANY STANDARD DRINKS CONTAINING ALCOHOL DO YOU HAVE ON A TYPICAL DAY: 3 OR 4

## 2022-09-08 ASSESSMENT — SOCIAL DETERMINANTS OF HEALTH (SDOH)
DO YOU BELONG TO ANY CLUBS OR ORGANIZATIONS SUCH AS CHURCH GROUPS UNIONS, FRATERNAL OR ATHLETIC GROUPS, OR SCHOOL GROUPS?: NO
HOW OFTEN DO YOU ATTEND CHURCH OR RELIGIOUS SERVICES?: NEVER
HOW HARD IS IT FOR YOU TO PAY FOR THE VERY BASICS LIKE FOOD, HOUSING, MEDICAL CARE, AND HEATING?: SOMEWHAT HARD
HOW OFTEN DO YOU ATTENT MEETINGS OF THE CLUB OR ORGANIZATION YOU BELONG TO?: NEVER
HOW OFTEN DO YOU GET TOGETHER WITH FRIENDS OR RELATIVES?: MORE THAN THREE TIMES A WEEK
IN A TYPICAL WEEK, HOW MANY TIMES DO YOU TALK ON THE PHONE WITH FAMILY, FRIENDS, OR NEIGHBORS?: MORE THAN THREE TIMES A WEEK
ARE YOU MARRIED, WIDOWED, DIVORCED, SEPARATED, NEVER MARRIED, OR LIVING WITH A PARTNER?: NEVER MARRIED

## 2022-09-08 NOTE — ASSESSMENT & PLAN NOTE
Rx losartan 25 mg daily.  Advised patient to keep well-hydrated when out in the sun.  Follow-up 2 months for blood pressure check.  Continue clonidine 0.3 mg daily this is for Tourette's syndrome.    Chronic disease not well controlled with medication management.

## 2022-09-08 NOTE — PROGRESS NOTES
Community Health Worker Intake     Synopsis: CHW received referral from patient's PCP to help with dental resources. CHW completed intake with patient over phone.     Living Situation/Home Environment: Patient lives at home with parents whom he helps take care of. Patient does not pay rent.   Food Source/Diet: Patient eats a regular diet and goes to grocery store. Patient has difficulty eating some foods due to dental issues.   Transportation: Patient is an active  and has car. Patient's parents do not drive.  Financial Situation/Sources of Income: Patient has no current income. 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 Patient reports his father has prison savings that he uses to pay for groceries and other needs.  Support System: Patient takes care of parents who help support him financially.   Medical Equipment: None  Stress: Patient reports none.  Exercise: Patient reports he previously engaged in golfing, mountain biking, and softball teams but does not go anymore because it was shut down with COVID. CHW encouraged patient to look and see what is back open. Patient spoke about calling his old team for softball to see if he could join.  Confidence in Managing Health: Not addressed  Other resources needed: Patient needs dental resources. CHW confirmed with patient he has [place] [employer] although he does not know how. Patient was encouraged to look on [place] [employer] website for providers. CHW will call clinics in [place] to see if they accept [place] [employer]. Patient aware he may have a co-pay.     CHW completed SDOH intake. Patient cares for parents who are both on feeding tubes and do not drive. Patient reports he goes to grocery store for them and manages their medications. Patient reports his parents bathe themselves and use bathroom and can get dressed alone. Patient wants to know how to be paid for caring for them. CHW spoke with patient about AD[employer] application and COP[employer] program.  CHW explained the current work he is doing would not qualify for the program. Patient requested info. CHW will mail CBC application to patient's address. Patient reports he watches shows all day and does not talk to anyone. Patient thought about going to "[employer] to see if there are any courses he can take, such as guitar courses. CHW highly encouraged patient to do so. CHW asked if patient has ever applied for Medicaid and patient reported he has not.     Plan:   CHW will check with SW to see if patient may qualify for Medicaid. CHW will mail CBC application. CHW will call dental clinics. CHW will check in next week.

## 2022-09-08 NOTE — PROGRESS NOTES
HISTORY OF PRESENT ILLNESS: Adis is a pleasant 42 y.o. male, established patient who presents today to discuss medical problems as listed below:    Problem   Primary Hypertension    Patient was noted to have elevated blood pressure last Visit.  He reported that he has been taking his father's metoprolol and that recommended against that.  Since then he has been checking his blood pressures at home.  They are mostly running borderline 140 systolic over 80 diastolic with occasional readings into the 160 systolic.  He denies any chest pain, dyspnea on exertion, edema, dizziness.  He has chronic headaches.      Periodontal Disease        Current Outpatient Medications Ordered in Epic   Medication Sig Dispense Refill    losartan (COZAAR) 25 MG Tab Take 1 Tablet by mouth every day. 30 Tablet 2    levothyroxine (SYNTHROID) 112 MCG Tab Take 1 Tablet by mouth every morning on an empty stomach. 90 Tablet 1    cloNIDine (CATAPRES) 0.3 MG Tab Take 1 Tablet by mouth 3 times a day. 270 Tablet 3    albuterol 108 (90 Base) MCG/ACT Aero Soln inhalation aerosol Inhale 2 Puffs every 6 hours as needed for Shortness of Breath. 18 g 3    lansoprazole (PREVACID) 30 MG CAPSULE DELAYED RELEASE TAKE 1 CAPSULE BY MOUTH TWICE A DAY 30 MONUTES BEFORE BREAKFAST AND 30 MINUTES BEFORE DINNER      ferrous sulfate 325 (65 Fe) MG tablet TAKE 1 TABLET BY MOUTH EVERY DAY 90 Tablet 1    dicyclomine (BENTYL) 10 MG Cap TAKE 1 TABLET BY MOUTH EVERY 6 HOURS AS NEEDED FOR ABDOMINAL CRAMPS AND/OR DISCOMFORT      ondansetron (ZOFRAN) 4 MG Tab tablet Take 4 mg by mouth every four hours as needed.       No current Epic-ordered facility-administered medications on file.       Review of systems:  Per HPI    Past Medical History:   Diagnosis Date    GERD (gastroesophageal reflux disease)     IBS (irritable bowel syndrome)     OCD (obsessive compulsive disorder)      Past Surgical History:   Procedure Laterality Date    APPENDECTOMY  2018     Social History  "    Tobacco Use    Smoking status: Never    Smokeless tobacco: Never   Vaping Use    Vaping Use: Never used   Substance Use Topics    Alcohol use: Not Currently     Alcohol/week: 12.6 oz     Types: 21 Standard drinks or equivalent per week     Comment: A few drinks a night    Drug use: No      Family History   Problem Relation Age of Onset    Stroke Father     Myasthenia gravis Father     No Known Problems Sister     No Known Problems Brother      Current Outpatient Medications   Medication Sig Dispense Refill    losartan (COZAAR) 25 MG Tab Take 1 Tablet by mouth every day. 30 Tablet 2    levothyroxine (SYNTHROID) 112 MCG Tab Take 1 Tablet by mouth every morning on an empty stomach. 90 Tablet 1    cloNIDine (CATAPRES) 0.3 MG Tab Take 1 Tablet by mouth 3 times a day. 270 Tablet 3    albuterol 108 (90 Base) MCG/ACT Aero Soln inhalation aerosol Inhale 2 Puffs every 6 hours as needed for Shortness of Breath. 18 g 3    lansoprazole (PREVACID) 30 MG CAPSULE DELAYED RELEASE TAKE 1 CAPSULE BY MOUTH TWICE A DAY 30 MONUTES BEFORE BREAKFAST AND 30 MINUTES BEFORE DINNER      ferrous sulfate 325 (65 Fe) MG tablet TAKE 1 TABLET BY MOUTH EVERY DAY 90 Tablet 1    dicyclomine (BENTYL) 10 MG Cap TAKE 1 TABLET BY MOUTH EVERY 6 HOURS AS NEEDED FOR ABDOMINAL CRAMPS AND/OR DISCOMFORT      ondansetron (ZOFRAN) 4 MG Tab tablet Take 4 mg by mouth every four hours as needed.       No current facility-administered medications for this visit.       Allergies:  Allergies   Allergen Reactions    Sumatriptan      Dizziness, sweating, rapid heartbeat       Allergies, past medical history, past surgical history, family history, social history reviewed and updated.    Objective:    BP (!) 138/90   Pulse 83   Temp 36.4 °C (97.5 °F) (Temporal)   Resp 16   Ht 1.778 m (5' 10\")   Wt 83 kg (182 lb 15.7 oz)   SpO2 97%   BMI 26.26 kg/m²    Body mass index is 26.26 kg/m².    Physical exam:  General: Normal appearance, no acute distress, not " ill-appearing  HEENT: EOM intact, conjunctiva normal limits, negative right/left eye discharge.  Sclera anicteric  Cardiovascular: Normal rate and rhythm, no murmurs  Pulmonary: No respiratory distress, no wheezing, no rales, breath sounds normal.  Abdomen: Bowel sounds normal, flat, soft.  Musculoskeletal: No edema bilaterally  Skin: Warm, dry, no lesions  Neurological: No focal deficits, normal gait  Psychiatric: Mood within normal limits    Assessment/Plan:    Problem List Items Addressed This Visit       Hypothyroidism    Relevant Orders    TSH    FREE THYROXINE    OCD (obsessive compulsive disorder)    Relevant Orders    Referral to Behavioral Health    Anxiety and depression    Relevant Orders    Referral to Behavioral Health    Alcohol use disorder, severe, dependence (HCC)    Relevant Orders    Referral to Behavioral Health    Primary hypertension     Rx losartan 25 mg daily.  Advised patient to keep well-hydrated when out in the sun.  Follow-up 2 months for blood pressure check.  Continue clonidine 0.3 mg daily this is for Tourette's syndrome.    Chronic disease not well controlled with medication management.             Relevant Medications    losartan (COZAAR) 25 MG Tab    Periodontal disease     Patient has severe periodontal disease.  He reports that the work he needs done is too much money for him to afford.  He does not work or have a source of income.  I suspect he would have trouble trying to find the community resources available to him as he has some evidence of an unspecified behavioral/developmental disorder.  We will reach out to  for help with this.         Relevant Orders    REFERRAL TO CARE MANAGEMENT        Return in about 3 months (around 12/7/2022) for blood pressure.

## 2022-09-08 NOTE — ASSESSMENT & PLAN NOTE
Patient has severe periodontal disease.  He reports that the work he needs done is too much money for him to afford.  He does not work or have a source of income.  I suspect he would have trouble trying to find the community resources available to him as he has some evidence of an unspecified behavioral/developmental disorder.  We will reach out to  for help with this.

## 2022-09-13 ENCOUNTER — PATIENT OUTREACH (OUTPATIENT)
Dept: HEALTH INFORMATION MANAGEMENT | Facility: OTHER | Age: 42
End: 2022-09-13
Payer: COMMERCIAL

## 2022-09-13 NOTE — PROGRESS NOTES
9/13/22  CHW called patient to go over dental resources and to check in. CHW left voice message with contact info for CCM.    9/14/22  CHW called patient and left voice message with contact info for CCM. CHW will send contact letter with info for [facility], [facility] and [facility]. All of these clinics are located in [place] and [place] [facility].     CHW will not follow as patient is unable to be contacted for follow up. CHW was mailed contact info for dentist resources and provided info in voice message.

## 2022-10-02 DIAGNOSIS — I10 PRIMARY HYPERTENSION: ICD-10-CM

## 2022-10-04 RX ORDER — LOSARTAN POTASSIUM 25 MG/1
25 TABLET ORAL DAILY
Qty: 90 TABLET | Refills: 2 | Status: SHIPPED | OUTPATIENT
Start: 2022-10-04

## 2022-10-19 ENCOUNTER — TELEPHONE (OUTPATIENT)
Dept: NEUROLOGY | Facility: MEDICAL CENTER | Age: 42
End: 2022-10-19
Payer: COMMERCIAL

## 2022-10-19 NOTE — TELEPHONE ENCOUNTER
New Patient     [facility] Patient Demographics? Yes    Is visit type and length correct?  Yes    Is referral attached to visit? Yes    Were records received from referring provider? No, I have put in a request for records to be sent to us on 10/19.22    Patient was not contacted to have someone accompany them to visit?    Is this appointment scheduled as a Hospital Follow-Up?  No    Does the patient require any pre procedure or post procedure follow up? No    If any orders were placed at last visit or intended to be done for this visit do we have Results/Consult Notes? No  Labs - Labs were not ordered at last office visit.  Note: If patient appointment is for lab review and patient did not complete labs, check with provider if OK to reschedule patient until labs completed.  Imaging - Imaging was not ordered at last office visit.  Referrals - No referrals were ordered at last office visit.        10.  If patient appointment is for Botox - is order pended for provider? No

## 2022-10-25 ENCOUNTER — APPOINTMENT (OUTPATIENT)
Dept: NEUROLOGY | Facility: MEDICAL CENTER | Age: 42
End: 2022-10-25
Attending: NURSE PRACTITIONER
Payer: COMMERCIAL

## 2023-01-24 ENCOUNTER — OFFICE VISIT (OUTPATIENT)
Dept: MEDICAL GROUP | Facility: PHYSICIAN GROUP | Age: 43
End: 2023-01-24
Payer: COMMERCIAL

## 2023-01-24 ENCOUNTER — TELEPHONE (OUTPATIENT)
Dept: MEDICAL GROUP | Facility: PHYSICIAN GROUP | Age: 43
End: 2023-01-24

## 2023-01-24 VITALS
OXYGEN SATURATION: 96 % | BODY MASS INDEX: 26.67 KG/M2 | WEIGHT: 186.29 LBS | HEART RATE: 117 BPM | RESPIRATION RATE: 16 BRPM | TEMPERATURE: 97.6 F | SYSTOLIC BLOOD PRESSURE: 128 MMHG | DIASTOLIC BLOOD PRESSURE: 88 MMHG | HEIGHT: 70 IN

## 2023-01-24 DIAGNOSIS — R22.1 MASS PRESENT ON ONE SIDE OF NECK: ICD-10-CM

## 2023-01-24 PROCEDURE — 99214 OFFICE O/P EST MOD 30 MIN: CPT | Performed by: STUDENT IN AN ORGANIZED HEALTH CARE EDUCATION/TRAINING PROGRAM

## 2023-01-24 RX ORDER — FAMOTIDINE 40 MG/1
TABLET, FILM COATED ORAL
COMMUNITY
Start: 2023-01-23

## 2023-01-24 RX ORDER — SULFAMETHOXAZOLE AND TRIMETHOPRIM 800; 160 MG/1; MG/1
1 TABLET ORAL 2 TIMES DAILY
Qty: 14 TABLET | Refills: 0 | Status: SHIPPED | OUTPATIENT
Start: 2023-01-24 | End: 2023-01-31

## 2023-01-24 ASSESSMENT — PATIENT HEALTH QUESTIONNAIRE - PHQ9: CLINICAL INTERPRETATION OF PHQ2 SCORE: 0

## 2023-01-24 ASSESSMENT — FIBROSIS 4 INDEX: FIB4 SCORE: 1.24

## 2023-01-25 PROBLEM — R22.1 MASS PRESENT ON ONE SIDE OF NECK: Status: ACTIVE | Noted: 2023-01-25

## 2023-01-25 NOTE — PROGRESS NOTES
HISTORY OF PRESENT ILLNESS: Adis is a pleasant 42 y.o. male, established patient who presents today to discuss medical problems as listed below:    Problem   Mass Present On One Side of Neck    Patient reports that he has had a small skin lump on his neck that has over the last few weeks started getting much larger and more painful.  It is red and very painful to touch and does not feel good when rubbing on his clothing.  He denies any fevers, trouble swallowing.          Current Outpatient Medications Ordered in Epic   Medication Sig Dispense Refill    famotidine (PEPCID) 40 MG Tab       sulfamethoxazole-trimethoprim (BACTRIM DS) 800-160 MG tablet Take 1 Tablet by mouth 2 times a day for 7 days. 14 Tablet 0    losartan (COZAAR) 25 MG Tab TAKE 1 TABLET BY MOUTH EVERY DAY 90 Tablet 2    levothyroxine (SYNTHROID) 112 MCG Tab Take 1 Tablet by mouth every morning on an empty stomach. 90 Tablet 1    cloNIDine (CATAPRES) 0.3 MG Tab Take 1 Tablet by mouth 3 times a day. 270 Tablet 3    albuterol 108 (90 Base) MCG/ACT Aero Soln inhalation aerosol Inhale 2 Puffs every 6 hours as needed for Shortness of Breath. 18 g 3    lansoprazole (PREVACID) 30 MG CAPSULE DELAYED RELEASE TAKE 1 CAPSULE BY MOUTH TWICE A DAY 30 MONUTES BEFORE BREAKFAST AND 30 MINUTES BEFORE DINNER      ferrous sulfate 325 (65 Fe) MG tablet TAKE 1 TABLET BY MOUTH EVERY DAY 90 Tablet 1    dicyclomine (BENTYL) 10 MG Cap TAKE 1 TABLET BY MOUTH EVERY 6 HOURS AS NEEDED FOR ABDOMINAL CRAMPS AND/OR DISCOMFORT      ondansetron (ZOFRAN) 4 MG Tab tablet Take 4 mg by mouth every four hours as needed.       No current Epic-ordered facility-administered medications on file.       Review of systems:  Per HPI    Past Medical History:   Diagnosis Date    GERD (gastroesophageal reflux disease)     IBS (irritable bowel syndrome)     OCD (obsessive compulsive disorder)      Past Surgical History:   Procedure Laterality Date    APPENDECTOMY  2018     Social History     Tobacco  Use    Smoking status: Never    Smokeless tobacco: Never   Vaping Use    Vaping Use: Never used   Substance Use Topics    Alcohol use: Not Currently     Alcohol/week: 12.6 oz     Types: 21 Standard drinks or equivalent per week     Comment: A few drinks a night    Drug use: No      Family History   Problem Relation Age of Onset    Stroke Father     Myasthenia gravis Father     No Known Problems Sister     No Known Problems Brother      Current Outpatient Medications   Medication Sig Dispense Refill    famotidine (PEPCID) 40 MG Tab       sulfamethoxazole-trimethoprim (BACTRIM DS) 800-160 MG tablet Take 1 Tablet by mouth 2 times a day for 7 days. 14 Tablet 0    losartan (COZAAR) 25 MG Tab TAKE 1 TABLET BY MOUTH EVERY DAY 90 Tablet 2    levothyroxine (SYNTHROID) 112 MCG Tab Take 1 Tablet by mouth every morning on an empty stomach. 90 Tablet 1    cloNIDine (CATAPRES) 0.3 MG Tab Take 1 Tablet by mouth 3 times a day. 270 Tablet 3    albuterol 108 (90 Base) MCG/ACT Aero Soln inhalation aerosol Inhale 2 Puffs every 6 hours as needed for Shortness of Breath. 18 g 3    lansoprazole (PREVACID) 30 MG CAPSULE DELAYED RELEASE TAKE 1 CAPSULE BY MOUTH TWICE A DAY 30 MONUTES BEFORE BREAKFAST AND 30 MINUTES BEFORE DINNER      ferrous sulfate 325 (65 Fe) MG tablet TAKE 1 TABLET BY MOUTH EVERY DAY 90 Tablet 1    dicyclomine (BENTYL) 10 MG Cap TAKE 1 TABLET BY MOUTH EVERY 6 HOURS AS NEEDED FOR ABDOMINAL CRAMPS AND/OR DISCOMFORT      ondansetron (ZOFRAN) 4 MG Tab tablet Take 4 mg by mouth every four hours as needed.       No current facility-administered medications for this visit.       Allergies:  Allergies   Allergen Reactions    Sumatriptan      Dizziness, sweating, rapid heartbeat       Allergies, past medical history, past surgical history, family history, social history reviewed and updated.    Objective:    /88 (BP Location: Right arm, Patient Position: Sitting, BP Cuff Size: Adult)   Pulse (!) 117   Temp 36.4 °C (97.6  "°F) (Temporal)   Resp 16   Ht 1.778 m (5' 10\")   Wt 84.5 kg (186 lb 4.6 oz)   SpO2 96%   BMI 26.73 kg/m²    Body mass index is 26.73 kg/m².    Physical exam:  General: Normal appearance, no acute distress, not ill-appearing  HEENT: EOM intact, conjunctiva normal limits, negative right/left eye discharge.  Sclera anicteric  Cardiovascular: Normal rate and rhythm, no murmurs  Pulmonary: No respiratory distress, no wheezing, no rales, breath sounds normal.  Abdomen: Bowel sounds normal, flat, soft.  Musculoskeletal: No edema bilaterally  Skin: Warm, dry, no lesions.  6 cm x 3 cm depth erythematous fluctuant mass tender to palpation  Neurological: No focal deficits, normal gait  Psychiatric: Mood within normal limits    Assessment/Plan:    Problem List Items Addressed This Visit       Mass present on one side of neck     6 cm diameter about 3 cm in depth erythematous fluctuant mass on the right side of his neck.    Plan:  Ultrasound ordered  Bactrim 1 tablet twice daily for 7 days  Urgent referral to ENT.  This lesion will need to be I&D.  ER precautions advised if starting to have fevers, difficulty swallowing or breathing.         Relevant Orders    Referral to ENT    US-SOFT TISSUES OF HEAD - NECK        Return if symptoms worsen or fail to improve, for symptoms.       "

## 2023-01-25 NOTE — ASSESSMENT & PLAN NOTE
6 cm diameter about 3 cm in depth erythematous fluctuant mass on the right side of his neck.    Plan:  Ultrasound ordered  Bactrim 1 tablet twice daily for 7 days  Urgent referral to ENT.  This lesion will need to be I&D.  ER precautions advised if starting to have fevers, difficulty swallowing or breathing.

## 2023-01-29 DIAGNOSIS — E03.9 HYPOTHYROIDISM, UNSPECIFIED TYPE: ICD-10-CM

## 2023-01-29 DIAGNOSIS — E03.9 ACQUIRED HYPOTHYROIDISM: ICD-10-CM

## 2023-01-30 NOTE — TELEPHONE ENCOUNTER
Received request via: Patient    Was the patient seen in the last year in this department? Yes    Does the patient have an active prescription (recently filled or refills available) for medication(s) requested? No    Does the patient have detention Plus and need 100 day supply (blood pressure, diabetes and cholesterol meds only)? Patient does not have SCP

## 2023-02-01 RX ORDER — LEVOTHYROXINE SODIUM 112 UG/1
TABLET ORAL
Qty: 30 TABLET | Refills: 5 | Status: SHIPPED | OUTPATIENT
Start: 2023-02-01 | End: 2023-05-18

## 2023-03-08 ENCOUNTER — APPOINTMENT (OUTPATIENT)
Dept: MEDICAL GROUP | Facility: PHYSICIAN GROUP | Age: 43
End: 2023-03-08
Payer: COMMERCIAL

## 2023-05-17 DIAGNOSIS — E03.9 HYPOTHYROIDISM, UNSPECIFIED TYPE: ICD-10-CM

## 2023-05-17 DIAGNOSIS — E03.9 ACQUIRED HYPOTHYROIDISM: ICD-10-CM

## 2023-05-18 RX ORDER — LEVOTHYROXINE SODIUM 112 UG/1
TABLET ORAL
Qty: 90 TABLET | Refills: 1 | Status: SHIPPED | OUTPATIENT
Start: 2023-05-18 | End: 2023-12-27

## 2023-12-27 DIAGNOSIS — E03.9 HYPOTHYROIDISM, UNSPECIFIED TYPE: ICD-10-CM

## 2023-12-27 DIAGNOSIS — E03.9 ACQUIRED HYPOTHYROIDISM: ICD-10-CM

## 2023-12-27 RX ORDER — LEVOTHYROXINE SODIUM 112 UG/1
TABLET ORAL
Qty: 90 TABLET | Refills: 1 | Status: SHIPPED | OUTPATIENT
Start: 2023-12-27

## 2024-05-29 ENCOUNTER — TELEPHONE (OUTPATIENT)
Dept: MEDICAL GROUP | Facility: PHYSICIAN GROUP | Age: 44
End: 2024-05-29
Payer: COMMERCIAL

## 2025-02-18 ENCOUNTER — APPOINTMENT (OUTPATIENT)
Dept: RADIOLOGY | Facility: MEDICAL CENTER | Age: 45
End: 2025-02-18
Attending: NURSE PRACTITIONER
Payer: MEDICAID